# Patient Record
Sex: FEMALE | Race: WHITE | NOT HISPANIC OR LATINO | Employment: FULL TIME | URBAN - METROPOLITAN AREA
[De-identification: names, ages, dates, MRNs, and addresses within clinical notes are randomized per-mention and may not be internally consistent; named-entity substitution may affect disease eponyms.]

---

## 2016-11-30 LAB — HUMAN PAPILLOMAVIRUS (HPV): NORMAL

## 2018-07-11 ENCOUNTER — OFFICE VISIT (OUTPATIENT)
Dept: FAMILY MEDICINE | Facility: CLINIC | Age: 23
End: 2018-07-11
Payer: COMMERCIAL

## 2018-07-11 VITALS
TEMPERATURE: 98 F | WEIGHT: 122.56 LBS | OXYGEN SATURATION: 98 % | DIASTOLIC BLOOD PRESSURE: 70 MMHG | BODY MASS INDEX: 20.42 KG/M2 | HEART RATE: 96 BPM | SYSTOLIC BLOOD PRESSURE: 130 MMHG | HEIGHT: 65 IN

## 2018-07-11 DIAGNOSIS — F41.9 ANXIETY: ICD-10-CM

## 2018-07-11 DIAGNOSIS — Z00.00 ANNUAL PHYSICAL EXAM: Primary | ICD-10-CM

## 2018-07-11 PROCEDURE — 99385 PREV VISIT NEW AGE 18-39: CPT | Mod: S$GLB,,, | Performed by: INTERNAL MEDICINE

## 2018-07-11 PROCEDURE — 99999 PR PBB SHADOW E&M-NEW PATIENT-LVL III: CPT | Mod: PBBFAC,,, | Performed by: INTERNAL MEDICINE

## 2018-07-11 RX ORDER — BUSPIRONE HYDROCHLORIDE 7.5 MG/1
7.5 TABLET ORAL DAILY PRN
Qty: 30 TABLET | Refills: 0 | Status: SHIPPED | OUTPATIENT
Start: 2018-07-11 | End: 2019-05-21

## 2018-07-11 RX ORDER — NORETHINDRONE ACETATE AND ETHINYL ESTRADIOL 1MG-20(21)
1 KIT ORAL DAILY
Refills: 3 | COMMUNITY
Start: 2018-05-30 | End: 2019-03-07

## 2018-07-11 NOTE — PROGRESS NOTES
SUBJECTIVE     Chief Complaint   Patient presents with    [facility]    Annual Exam     leg issues        HPI  Niesha Christianson is a 23 y.o. female with multiple medical diagnoses as listed in the medical history and problem list that presents for annual exam. Pt was last seen by a physician ~2 years ago, so has been doing well since her last visit. She has a good appetite, but is slightly decreased. She gets ~7-8 hours of sleep nightly. Pt does not exercise. She does not take any OTC supplements. Her last pap smear in 4/2018.    PAST MEDICAL HISTORY:  History reviewed. No pertinent past medical history.    PAST SURGICAL HISTORY:  History reviewed. No pertinent surgical history.    SOCIAL HISTORY:  Social History     Social History    Marital status:      Spouse name: N/A    Number of children: N/A    Years of education: N/A     Occupational History    Not on file.     Social History Main Topics    Smoking status: Former Smoker     Quit date: 4/9/2013    Smokeless tobacco: Not on file    Alcohol use 10.8 oz/week     18 Cans of beer per week    Drug use: No    Sexual activity: Yes     Other Topics Concern    Not on file     Social History Narrative    No narrative on file       FAMILY HISTORY:  Family History   Problem Relation Age of Onset    Hypertension Mother     Hyperlipidemia Mother     Hypertension Father     Hyperlipidemia Father     Hypertension Maternal Uncle     Hypertension Paternal Uncle     Stroke Maternal Grandfather     Diabetes Paternal Grandmother     Cancer Paternal Grandfather 80        Pancreatic       ALLERGIES AND MEDICATIONS: updated and reviewed.  Review of patient's allergies indicates:  No Known Allergies  Current Outpatient Prescriptions   Medication Sig Dispense Refill    busPIRone (BUSPAR) 7.5 MG tablet Take 1 tablet (7.5 mg total) by mouth daily as needed. 30 tablet 0    desonide (DESOWEN) 0.05 % lotion Apply topically 2 (two) times daily. No longer  "than 2 weeks 1 Bottle 1    lidocaine HCl-hydrocortison ac (LIDAMANTLE-HC) 3-0.5 % topical cream Apply 1 application topically 3 (three) times daily as needed. 1 Tube 3    norethindrone-ethinyl estradiol (JUNEL FE 1/20) 1 mg-20 mcg (21)/75 mg (7) per tablet Take 1 tablet by mouth once daily.  3     No current facility-administered medications for this visit.        ROS  Review of Systems   Constitutional: Negative for chills and fever.   HENT: Negative for hearing loss and sore throat.    Eyes: Negative for visual disturbance.   Respiratory: Positive for shortness of breath. Negative for cough.    Cardiovascular: Negative for chest pain, palpitations and leg swelling.   Gastrointestinal: Positive for abdominal pain. Negative for constipation, diarrhea, nausea and vomiting.   Genitourinary: Negative for dysuria, frequency and urgency.   Musculoskeletal: Positive for arthralgias (BLE ). Negative for joint swelling and myalgias.   Skin: Negative for rash and wound.   Neurological: Negative for headaches.   Psychiatric/Behavioral: Negative for agitation and confusion. The patient is nervous/anxious.          OBJECTIVE     Physical Exam  Vitals:    07/11/18 1343   BP: 130/70   Pulse: 96   Temp: 98.3 °F (36.8 °C)    Body mass index is 20.4 kg/m².  Weight: 55.6 kg (122 lb 9.2 oz)   Height: 5' 5" (165.1 cm)     Physical Exam   Constitutional: She is oriented to person, place, and time. She appears well-developed and well-nourished. No distress.   HENT:   Head: Normocephalic and atraumatic.   Right Ear: External ear normal.   Left Ear: External ear normal.   Nose: Nose normal.   Mouth/Throat: Oropharynx is clear and moist.   Eyes: Conjunctivae and EOM are normal. Right eye exhibits no discharge. Left eye exhibits no discharge. No scleral icterus.   Neck: Normal range of motion. Neck supple. No JVD present. No tracheal deviation present.   Cardiovascular: Normal rate, regular rhythm and intact distal pulses.  Exam reveals no " gallop and no friction rub.    No murmur heard.  Pulmonary/Chest: Effort normal and breath sounds normal. No respiratory distress. She has no wheezes.   Abdominal: Soft. Bowel sounds are normal. She exhibits no distension and no mass. There is no tenderness. There is no rebound and no guarding.   Musculoskeletal: Normal range of motion. She exhibits no edema, tenderness or deformity.   Neurological: She is alert and oriented to person, place, and time. She exhibits normal muscle tone. Coordination normal.   Skin: Skin is warm and dry. No rash noted. No erythema.   Psychiatric: She has a normal mood and affect. Her behavior is normal. Judgment and thought content normal.         Health Maintenance       Date Due Completion Date    Lipid Panel 1995 ---    HPV Vaccines (1 of 3 - Female 3 Dose Series) 01/24/2006 ---    CHLAMYDIA SCREENING 01/24/2010 ---    TETANUS VACCINE 01/24/2013 ---    Pap Smear 01/24/2016 ---    Influenza Vaccine 08/01/2018 ---            ASSESSMENT     23 y.o. female with     1. Annual physical exam    2. Anxiety        PLAN:     1. Annual physical exam  - Counseled on age appropriate medical preventative services, including age appropriate cancer screenings, over all nutritional health, need for a consistent exercise regimen and an over all push towards maintaining a vigorous and active lifestyle.  Counseled on age appropriate vaccines and discussed upcoming health care needs based on age/gender.  Spent time with patient counseling on need for a good patient/doctor relationship moving forward.  Discussed use of common OTC medications and supplements.  Discussed common dietary aids and use of caffeine and the need for good sleep hygiene and stress management.  - MEHRAN completed today to get pap smear results  - CBC auto differential; Future  - Comprehensive metabolic panel; Future  - TSH; Future  - Lipid panel; Future    2. Anxiety  - Will start trial course of Buspar prn  - busPIRone (BUSPAR)  7.5 MG tablet; Take 1 tablet (7.5 mg total) by mouth daily as needed.  Dispense: 30 tablet; Refill: 0        RTC in 4 weeks for repeat assessment of current treatment plan       Jaquelin Morales MD  07/11/2018 1:51 PM        No Follow-up on file.

## 2018-07-24 ENCOUNTER — TELEPHONE (OUTPATIENT)
Dept: ADMINISTRATIVE | Facility: HOSPITAL | Age: 23
End: 2018-07-24

## 2019-02-14 ENCOUNTER — OFFICE VISIT (OUTPATIENT)
Dept: FAMILY MEDICINE | Facility: CLINIC | Age: 24
End: 2019-02-14
Payer: COMMERCIAL

## 2019-02-14 VITALS
HEART RATE: 84 BPM | TEMPERATURE: 98 F | DIASTOLIC BLOOD PRESSURE: 80 MMHG | HEIGHT: 65 IN | SYSTOLIC BLOOD PRESSURE: 108 MMHG | RESPIRATION RATE: 18 BRPM | OXYGEN SATURATION: 98 % | BODY MASS INDEX: 22.48 KG/M2 | WEIGHT: 134.94 LBS

## 2019-02-14 DIAGNOSIS — M79.605 PAIN IN BOTH LOWER EXTREMITIES: Primary | ICD-10-CM

## 2019-02-14 DIAGNOSIS — M79.604 PAIN IN BOTH LOWER EXTREMITIES: Primary | ICD-10-CM

## 2019-02-14 PROCEDURE — 99999 PR PBB SHADOW E&M-EST. PATIENT-LVL IV: ICD-10-PCS | Mod: PBBFAC,,, | Performed by: INTERNAL MEDICINE

## 2019-02-14 PROCEDURE — 99214 PR OFFICE/OUTPT VISIT, EST, LEVL IV, 30-39 MIN: ICD-10-PCS | Mod: S$GLB,,, | Performed by: INTERNAL MEDICINE

## 2019-02-14 PROCEDURE — 99214 OFFICE O/P EST MOD 30 MIN: CPT | Mod: S$GLB,,, | Performed by: INTERNAL MEDICINE

## 2019-02-14 PROCEDURE — 99999 PR PBB SHADOW E&M-EST. PATIENT-LVL IV: CPT | Mod: PBBFAC,,, | Performed by: INTERNAL MEDICINE

## 2019-02-14 NOTE — PROGRESS NOTES
"SUBJECTIVE     Chief Complaint   Patient presents with    Leg Pain       HPI  Niesha Pierre is a 24 y.o. female with multiple medical diagnoses as listed in the medical history and problem list that presents for evaluation of BLE x 2 months. Pt reports pain is achy at a 4/10 and intermittent in nature without radiation. Pain can be in the inner thighs and lower legs. Pain occurs with rest and activity. Pt has been taking Advil on occasion, but is unclear if it has alleviated the discomfort. Denies any associated swelling, but she sometimes feels "knots" in her legs.     PAST MEDICAL HISTORY:  History reviewed. No pertinent past medical history.    PAST SURGICAL HISTORY:  History reviewed. No pertinent surgical history.    SOCIAL HISTORY:  Social History     Socioeconomic History    Marital status:      Spouse name: Not on file    Number of children: Not on file    Years of education: Not on file    Highest education level: Not on file   Social Needs    Financial resource strain: Not on file    Food insecurity - worry: Not on file    Food insecurity - inability: Not on file    Transportation needs - medical: Not on file    Transportation needs - non-medical: Not on file   Occupational History    Not on file   Tobacco Use    Smoking status: Former Smoker     Last attempt to quit: 2013     Years since quittin.8   Substance and Sexual Activity    Alcohol use: Yes     Alcohol/week: 10.8 oz     Types: 18 Cans of beer per week    Drug use: No    Sexual activity: Yes   Other Topics Concern    Not on file   Social History Narrative    Not on file       FAMILY HISTORY:  Family History   Problem Relation Age of Onset    Hypertension Mother     Hyperlipidemia Mother     Hypertension Father     Hyperlipidemia Father     Hypertension Maternal Uncle     Hypertension Paternal Uncle     Stroke Maternal Grandfather     Diabetes Paternal Grandmother     Cancer Paternal Grandfather 80 " "       Pancreatic       ALLERGIES AND MEDICATIONS: updated and reviewed.  Review of patient's allergies indicates:  No Known Allergies  Current Outpatient Medications   Medication Sig Dispense Refill    busPIRone (BUSPAR) 7.5 MG tablet Take 1 tablet (7.5 mg total) by mouth daily as needed. 30 tablet 0    desonide (DESOWEN) 0.05 % lotion Apply topically 2 (two) times daily. No longer than 2 weeks 1 Bottle 1    lidocaine HCl-hydrocortison ac (LIDAMANTLE-HC) 3-0.5 % topical cream Apply 1 application topically 3 (three) times daily as needed. 1 Tube 3    norethindrone-ethinyl estradiol (JUNEL FE 1/20) 1 mg-20 mcg (21)/75 mg (7) per tablet Take 1 tablet by mouth once daily.  3     No current facility-administered medications for this visit.        ROS  Review of Systems   Constitutional: Negative for chills and fever.   HENT: Negative for hearing loss and sore throat.    Eyes: Negative for visual disturbance.   Respiratory: Negative for cough and shortness of breath.    Cardiovascular: Negative for chest pain, palpitations and leg swelling.   Gastrointestinal: Negative for abdominal pain, constipation, diarrhea, nausea and vomiting.   Genitourinary: Negative for dysuria, frequency and urgency.   Musculoskeletal: Negative for arthralgias, joint swelling and myalgias.        BLE pain   Skin: Negative for rash and wound.   Neurological: Negative for headaches.   Psychiatric/Behavioral: Negative for agitation and confusion. The patient is not nervous/anxious.          OBJECTIVE     Physical Exam  Vitals:    02/14/19 1308   BP: 108/80   Pulse: 84   Resp: 18   Temp: 98.4 °F (36.9 °C)    Body mass index is 22.45 kg/m².  Weight: 61.2 kg (134 lb 14.7 oz)   Height: 5' 5" (165.1 cm)     Physical Exam   Constitutional: She is oriented to person, place, and time. She appears well-developed and well-nourished. No distress.   HENT:   Head: Normocephalic and atraumatic.   Right Ear: External ear normal.   Left Ear: External ear " normal.   Nose: Nose normal.   Mouth/Throat: Oropharynx is clear and moist.   Eyes: Conjunctivae and EOM are normal. Right eye exhibits no discharge. Left eye exhibits no discharge. No scleral icterus.   Neck: Normal range of motion. Neck supple. No JVD present. No tracheal deviation present.   Pulmonary/Chest: Effort normal. No respiratory distress.   Musculoskeletal: Normal range of motion. She exhibits no deformity.   Neurological: She is alert and oriented to person, place, and time. She exhibits normal muscle tone. Coordination normal.   Skin: Skin is warm and dry. No rash noted. No erythema.   Psychiatric: She has a normal mood and affect. Her behavior is normal. Judgment and thought content normal.         Health Maintenance       Date Due Completion Date    Lipid Panel 1995 ---    CHLAMYDIA SCREENING 01/24/2010 ---    HPV Vaccines (1 - Female 3-dose series) 01/24/2010 ---    TETANUS VACCINE 01/24/2013 ---    Influenza Vaccine 08/01/2018 ---    Pap Smear with HPV Cotest 11/30/2021 11/30/2016 (Done)    Override on 11/30/2016: Done (scanned in chart)            ASSESSMENT     24 y.o. female with     1. Pain in both lower extremities        PLAN:     1. Pain in both lower extremities  - Unknown etiology, but concern for autoimmune process vs vitamin def vs PAD; will workup as below  - Pt okay to take NSAIDs or Tylenol prn pain  - Sedimentation rate; Future  - Rheumatoid factor; Future  - Cyclic citrul peptide antibody, IgG; Future  - High sensitivity CRP (Cardiac CRP); Future  - C-reactive protein; Future  - Vitamin D; Future  - Vitamin B12; Future  - Folate; Future  - SALIMA Screen w/Reflex; Future  - US Lower Extrem Arteries Bilat with TAYA; Future  - US Lower Extremity Veins Bilateral; Future        RTC in 4 weeks for repeat assessment of current treatment plan       Jaquelin Morales MD  02/14/2019 1:13 PM        No Follow-up on file.

## 2019-02-16 ENCOUNTER — LAB VISIT (OUTPATIENT)
Dept: LAB | Facility: HOSPITAL | Age: 24
End: 2019-02-16
Attending: INTERNAL MEDICINE
Payer: COMMERCIAL

## 2019-02-16 DIAGNOSIS — M79.604 PAIN IN BOTH LOWER EXTREMITIES: ICD-10-CM

## 2019-02-16 DIAGNOSIS — M79.605 PAIN IN BOTH LOWER EXTREMITIES: ICD-10-CM

## 2019-02-16 LAB
CRP SERPL-MCNC: 0.5 MG/L
ERYTHROCYTE [SEDIMENTATION RATE] IN BLOOD BY WESTERGREN METHOD: 2 MM/HR

## 2019-02-16 PROCEDURE — 86141 C-REACTIVE PROTEIN HS: CPT

## 2019-02-16 PROCEDURE — 36415 COLL VENOUS BLD VENIPUNCTURE: CPT

## 2019-02-16 PROCEDURE — 85652 RBC SED RATE AUTOMATED: CPT

## 2019-02-16 PROCEDURE — 86200 CCP ANTIBODY: CPT

## 2019-02-16 PROCEDURE — 82607 VITAMIN B-12: CPT

## 2019-02-16 PROCEDURE — 86140 C-REACTIVE PROTEIN: CPT

## 2019-02-16 PROCEDURE — 82306 VITAMIN D 25 HYDROXY: CPT

## 2019-02-16 PROCEDURE — 86038 ANTINUCLEAR ANTIBODIES: CPT

## 2019-02-16 PROCEDURE — 82746 ASSAY OF FOLIC ACID SERUM: CPT

## 2019-02-16 PROCEDURE — 86431 RHEUMATOID FACTOR QUANT: CPT

## 2019-02-17 LAB
25(OH)D3+25(OH)D2 SERPL-MCNC: 29 NG/ML
CCP AB SER IA-ACNC: <0.5 U/ML
CRP SERPL-MCNC: 0.47 MG/L
FOLATE SERPL-MCNC: 11.1 NG/ML
VIT B12 SERPL-MCNC: 460 PG/ML

## 2019-02-18 ENCOUNTER — PATIENT MESSAGE (OUTPATIENT)
Dept: FAMILY MEDICINE | Facility: CLINIC | Age: 24
End: 2019-02-18

## 2019-02-18 PROBLEM — E55.9 VITAMIN D INSUFFICIENCY: Status: ACTIVE | Noted: 2019-02-18

## 2019-02-18 LAB
ANA SER QL IF: NORMAL
RHEUMATOID FACT SERPL-ACNC: <10 IU/ML

## 2019-02-22 ENCOUNTER — TELEPHONE (OUTPATIENT)
Dept: FAMILY MEDICINE | Facility: CLINIC | Age: 24
End: 2019-02-22

## 2019-02-22 ENCOUNTER — HOSPITAL ENCOUNTER (OUTPATIENT)
Dept: RADIOLOGY | Facility: HOSPITAL | Age: 24
Discharge: HOME | End: 2019-02-22
Attending: INTERNAL MEDICINE
Payer: COMMERCIAL

## 2019-02-22 ENCOUNTER — OFFICE VISIT (OUTPATIENT)
Dept: FAMILY MEDICINE | Facility: CLINIC | Age: 24
End: 2019-02-22
Payer: COMMERCIAL

## 2019-02-22 VITALS
BODY MASS INDEX: 23.11 KG/M2 | SYSTOLIC BLOOD PRESSURE: 124 MMHG | WEIGHT: 138.88 LBS | TEMPERATURE: 100 F | DIASTOLIC BLOOD PRESSURE: 82 MMHG | HEART RATE: 94 BPM | OXYGEN SATURATION: 97 %

## 2019-02-22 DIAGNOSIS — M79.605 PAIN IN BOTH LOWER EXTREMITIES: ICD-10-CM

## 2019-02-22 DIAGNOSIS — I82.432 ACUTE DEEP VEIN THROMBOSIS (DVT) OF POPLITEAL VEIN OF LEFT LOWER EXTREMITY: Primary | ICD-10-CM

## 2019-02-22 DIAGNOSIS — M79.604 PAIN IN BOTH LOWER EXTREMITIES: ICD-10-CM

## 2019-02-22 PROCEDURE — 93970 EXTREMITY STUDY: CPT | Mod: TC

## 2019-02-22 PROCEDURE — 99214 PR OFFICE/OUTPT VISIT, EST, LEVL IV, 30-39 MIN: ICD-10-PCS | Mod: S$GLB,,, | Performed by: INTERNAL MEDICINE

## 2019-02-22 PROCEDURE — 93925 LOWER EXTREMITY STUDY: CPT | Mod: 26,,, | Performed by: RADIOLOGY

## 2019-02-22 PROCEDURE — 93925 US LOWER EXTREMITY ARTERIES BILATERAL: ICD-10-PCS | Mod: 26,,, | Performed by: RADIOLOGY

## 2019-02-22 PROCEDURE — 99999 PR PBB SHADOW E&M-EST. PATIENT-LVL III: ICD-10-PCS | Mod: PBBFAC,,, | Performed by: INTERNAL MEDICINE

## 2019-02-22 PROCEDURE — 99999 PR PBB SHADOW E&M-EST. PATIENT-LVL III: CPT | Mod: PBBFAC,,, | Performed by: INTERNAL MEDICINE

## 2019-02-22 PROCEDURE — 93925 LOWER EXTREMITY STUDY: CPT | Mod: TC

## 2019-02-22 PROCEDURE — 93970 US LOWER EXTREMITY VEINS BILATERAL: ICD-10-PCS | Mod: 26,,, | Performed by: RADIOLOGY

## 2019-02-22 PROCEDURE — 93970 EXTREMITY STUDY: CPT | Mod: 26,,, | Performed by: RADIOLOGY

## 2019-02-22 PROCEDURE — 99214 OFFICE O/P EST MOD 30 MIN: CPT | Mod: S$GLB,,, | Performed by: INTERNAL MEDICINE

## 2019-02-22 NOTE — TELEPHONE ENCOUNTER
Pt called stating she was told she has a blood clot, but the Radiology department could not get a hold of me. I informed the pt that I do not have results yet nor have I been contacted by anyone. I informed pt that she would need an appointment to see me if she does have a DVT. She voiced understanding and asked if she could come now because she is so nervous. I told the pt yes. Will await results and see pt after results viewed and/or I have been contacted due to a positive test. Medical staff informed okay to override schedule when results made available.

## 2019-02-22 NOTE — PROGRESS NOTES
SUBJECTIVE     Chief Complaint   Patient presents with    Results     us       HPI  Niesha Pierre is a 24 y.o. female with multiple medical diagnoses as listed in the medical history and problem list that presents for follow-up after U/S today which was positive for a DVT. Pt reports she is nervous because she was told by the Radiology department that she has a blood clot in her left leg. She had been having pain in the RLE, so is surprised a blood clot was found in the LLE. She denies any recent surgery/hospitalizations, recent long travel via plane/car, she stopped OCPs back in 2018, but does have a family history of an uncle that had a blood clot in his arm. Pt reports smoking only 1 cigarette in the past 2 weeks or so. 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    PAST MEDICAL HISTORY:  History reviewed. No pertinent past medical history.    PAST SURGICAL HISTORY:  History reviewed. No pertinent surgical history.    SOCIAL HISTORY:  Social History     Socioeconomic History    Marital status:      Spouse name: Not on file    Number of children: Not on file    Years of education: Not on file    Highest education level: Not on file   Social Needs    Financial resource strain: Not on file    Food insecurity - worry: Not on file    Food insecurity - inability: Not on file    Transportation needs - medical: Not on file    Transportation needs - non-medical: Not on file   Occupational History    Not on file   Tobacco Use    Smoking status: Former Smoker     Last attempt to quit: 2013     Years since quittin.8   Substance and Sexual Activity    Alcohol use: Yes     Alcohol/week: 10.8 oz     Types: 18 Cans of beer per week    Drug use: No    Sexual activity: Yes   Other Topics Concern    Not on file   Social History Narrative    Not on file       FAMILY HISTORY:  Family History   Problem Relation Age of  Onset    Hypertension Mother     Hyperlipidemia Mother     Hypertension Father     Hyperlipidemia Father     Hypertension Maternal Uncle     Hypertension Paternal Uncle     Stroke Maternal Grandfather     Diabetes Paternal Grandmother     Cancer Paternal Grandfather 80        Pancreatic       ALLERGIES AND MEDICATIONS: updated and reviewed.  Review of patient's allergies indicates:  No Known Allergies  Current Outpatient Medications   Medication Sig Dispense Refill    apixaban (ELIQUIS) 5 mg Tab Take 2 tablets by mouth twice daily for 7 days, then take 1 tablet by mouth twice daily 208 tablet 0    busPIRone (BUSPAR) 7.5 MG tablet Take 1 tablet (7.5 mg total) by mouth daily as needed. 30 tablet 0    desonide (DESOWEN) 0.05 % lotion Apply topically 2 (two) times daily. No longer than 2 weeks 1 Bottle 1    lidocaine HCl-hydrocortison ac (LIDAMANTLE-HC) 3-0.5 % topical cream Apply 1 application topically 3 (three) times daily as needed. 1 Tube 3    norethindrone-ethinyl estradiol (JUNEL FE 1/20) 1 mg-20 mcg (21)/75 mg (7) per tablet Take 1 tablet by mouth once daily.  3     No current facility-administered medications for this visit.        ROS  Review of Systems   Constitutional: Negative for chills and fever.   HENT: Negative for hearing loss and sore throat.    Eyes: Negative for visual disturbance.   Respiratory: Negative for cough and shortness of breath.    Cardiovascular: Negative for chest pain, palpitations and leg swelling.   Gastrointestinal: Negative for abdominal pain, constipation, diarrhea, nausea and vomiting.   Genitourinary: Negative for dysuria, frequency and urgency.   Musculoskeletal: Negative for arthralgias, joint swelling and myalgias.   Skin: Negative for rash and wound.   Neurological: Negative for headaches.   Psychiatric/Behavioral: Negative for agitation and confusion. The patient is not nervous/anxious.          OBJECTIVE     Physical Exam  Vitals:    02/22/19 1027   BP:  124/82   Pulse: 94   Temp: 99.6 °F (37.6 °C)    Body mass index is 23.11 kg/m².  Weight: 63 kg (138 lb 14.2 oz)         Physical Exam   Constitutional: She is oriented to person, place, and time. She appears well-developed and well-nourished. No distress.   HENT:   Head: Normocephalic and atraumatic.   Right Ear: Hearing, tympanic membrane and external ear normal.   Left Ear: Hearing, tympanic membrane and external ear normal.   Nose: Nose normal. No rhinorrhea.   Mouth/Throat: Oropharynx is clear and moist. No uvula swelling. No posterior oropharyngeal edema or posterior oropharyngeal erythema.   Eyes: Conjunctivae and EOM are normal. Right eye exhibits no discharge. Left eye exhibits no discharge. No scleral icterus.   Neck: Normal range of motion. Neck supple. No JVD present. No tracheal deviation present.   Cardiovascular: Normal rate, regular rhythm and intact distal pulses. Exam reveals no gallop and no friction rub.   No murmur heard.  Pulmonary/Chest: Effort normal and breath sounds normal. No respiratory distress. She has no wheezes.   Abdominal: Soft. Bowel sounds are normal. She exhibits no distension and no mass. There is no tenderness. There is no rebound and no guarding.   Musculoskeletal: Normal range of motion. She exhibits no edema, tenderness or deformity.   Neurological: She is alert and oriented to person, place, and time. She exhibits normal muscle tone. Coordination normal.   Skin: Skin is warm and dry. No rash noted. No erythema.   Psychiatric: She has a normal mood and affect. Her behavior is normal. Judgment and thought content normal.         Health Maintenance       Date Due Completion Date    CHLAMYDIA SCREENING 01/24/2010 ---    HPV Vaccines (1 - Female 3-dose series) 01/24/2010 ---    TETANUS VACCINE 01/24/2013 ---    Influenza Vaccine 08/01/2018 ---    Pap Smear with HPV Cotest 11/30/2021 11/30/2016 (Done)    Override on 11/30/2016: Done (scanned in chart)            ASSESSMENT     24  y.o. female with     1. Acute deep vein thrombosis (DVT) of popliteal vein of left lower extremity        PLAN:     1. Acute deep vein thrombosis (DVT) of popliteal vein of left lower extremity  - Will start pt on Eliquis and pt given info on DVT  - Appears to be an unprovoked DVT as pt off OCPs since 7/2018, so will send to Heme/Onc for further eval  - Advised to go straight to ED for any chest pain/SOB as the clot could have moved into the lungs; also advised to wear loose fitting clothing and walk every 1-2 hours while on the job; she voiced understanding   - apixaban (ELIQUIS) 5 mg Tab; Take 2 tablets by mouth twice daily for 7 days, then take 1 tablet by mouth twice daily  Dispense: 208 tablet; Refill: 0  - POCT urine pregnancy; negative  - Ambulatory referral to Hematology / Oncology        RTC in 3 months     Jaquelin Morales MD  02/22/2019 11:01 AM        No Follow-up on file.

## 2019-02-25 ENCOUNTER — TELEPHONE (OUTPATIENT)
Dept: HEMATOLOGY/ONCOLOGY | Facility: CLINIC | Age: 24
End: 2019-02-25

## 2019-02-27 ENCOUNTER — TELEPHONE (OUTPATIENT)
Dept: HEMATOLOGY/ONCOLOGY | Facility: CLINIC | Age: 24
End: 2019-02-27

## 2019-02-28 ENCOUNTER — PATIENT MESSAGE (OUTPATIENT)
Dept: FAMILY MEDICINE | Facility: CLINIC | Age: 24
End: 2019-02-28

## 2019-03-07 ENCOUNTER — INITIAL CONSULT (OUTPATIENT)
Dept: HEMATOLOGY/ONCOLOGY | Facility: CLINIC | Age: 24
End: 2019-03-07
Payer: COMMERCIAL

## 2019-03-07 ENCOUNTER — LAB VISIT (OUTPATIENT)
Dept: LAB | Facility: HOSPITAL | Age: 24
End: 2019-03-07
Attending: ANESTHESIOLOGY
Payer: COMMERCIAL

## 2019-03-07 VITALS
HEIGHT: 66 IN | OXYGEN SATURATION: 99 % | HEART RATE: 91 BPM | SYSTOLIC BLOOD PRESSURE: 125 MMHG | WEIGHT: 139.13 LBS | DIASTOLIC BLOOD PRESSURE: 73 MMHG | BODY MASS INDEX: 22.36 KG/M2 | TEMPERATURE: 98 F

## 2019-03-07 DIAGNOSIS — I82.432 ACUTE DEEP VEIN THROMBOSIS (DVT) OF POPLITEAL VEIN OF LEFT LOWER EXTREMITY: ICD-10-CM

## 2019-03-07 DIAGNOSIS — I82.432 ACUTE DEEP VEIN THROMBOSIS (DVT) OF POPLITEAL VEIN OF LEFT LOWER EXTREMITY: Primary | ICD-10-CM

## 2019-03-07 LAB
FIBRINOGEN PPP-MCNC: 260 MG/DL
HCYS SERPL-SCNC: 8.2 UMOL/L

## 2019-03-07 PROCEDURE — 85300 ANTITHROMBIN III ACTIVITY: CPT

## 2019-03-07 PROCEDURE — 85305 CLOT INHIBIT PROT S TOTAL: CPT

## 2019-03-07 PROCEDURE — 81240 F2 GENE: CPT

## 2019-03-07 PROCEDURE — 85598 HEXAGNAL PHOSPH PLTLT NEUTRL: CPT

## 2019-03-07 PROCEDURE — 99999 PR PBB SHADOW E&M-EST. PATIENT-LVL III: ICD-10-PCS | Mod: PBBFAC,,, | Performed by: INTERNAL MEDICINE

## 2019-03-07 PROCEDURE — 81241 F5 GENE: CPT

## 2019-03-07 PROCEDURE — 83090 ASSAY OF HOMOCYSTEINE: CPT

## 2019-03-07 PROCEDURE — 99205 OFFICE O/P NEW HI 60 MIN: CPT | Mod: S$GLB,,, | Performed by: INTERNAL MEDICINE

## 2019-03-07 PROCEDURE — 85303 CLOT INHIBIT PROT C ACTIVITY: CPT

## 2019-03-07 PROCEDURE — 85384 FIBRINOGEN ACTIVITY: CPT

## 2019-03-07 PROCEDURE — 99999 PR PBB SHADOW E&M-EST. PATIENT-LVL III: CPT | Mod: PBBFAC,,, | Performed by: INTERNAL MEDICINE

## 2019-03-07 PROCEDURE — 86147 CARDIOLIPIN ANTIBODY EA IG: CPT | Mod: 59

## 2019-03-07 PROCEDURE — 85613 RUSSELL VIPER VENOM DILUTED: CPT

## 2019-03-07 PROCEDURE — 99205 PR OFFICE/OUTPT VISIT, NEW, LEVL V, 60-74 MIN: ICD-10-PCS | Mod: S$GLB,,, | Performed by: INTERNAL MEDICINE

## 2019-03-07 RX ORDER — CHOLECALCIFEROL (VITAMIN D3) 25 MCG
1000 TABLET ORAL DAILY
COMMUNITY
End: 2019-05-21

## 2019-03-07 NOTE — LETTER
March 7, 2019      Jaquelin Morales MD  [address] [address]  [address]  [place] [address]           [facility]  [address] [facility] [place] [address]  Phone: [phone]          Patient: Niesha Pierre   MR Number: [id number]   YOB: 1995   Date of Visit: 3/7/2019       Dear Dr. Jaquelin Morales:    Thank you for referring Niesha Pierre to me for evaluation. Attached you will find relevant portions of my assessment and plan of care.    If you have questions, please do not hesitate to call me. I look forward to following Niesha Pierre along with you.    Sincerely,    Brynn Del Real MD    Enclosure  CC:  No Recipients    If you would like to receive this communication electronically, please contact [email or web address] or [phone] to request more information on [facility] access.    For providers and/or their staff who would like to refer a patient to [facility], please contact us through our one-stop-shop provider referral line, [facility], at [phone].    If you feel you have received this communication in error or would no longer like to receive these types of communications, please e-mail [email or web address]

## 2019-03-07 NOTE — PROGRESS NOTES
Chief Complaint :   DVT    Hx of Present illness :  Patient is a 24 y.o. year old female who presents to the clinic today for   Oncology evaluation. Was experiencing pain left leg. Evaluated by physician. U/S revealed DVT in left Popliteal vein.  Was on BC pill for three years. 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     Transfusion :  None    Menstrual & obstetric Hx :  Nulliparous.   Age of menarche:  12  Age of first pregnancy: N/A  Lactation history: N/a  Age of menopause: N/A  HRT: None    Present Meds :   Medication List with Changes/Refills   Current Medications    APIXABAN (ELIQUIS) 5 MG TAB    Take 2 tablets by mouth twice daily for 7 days, then take 1 tablet by mouth twice daily    BUSPIRONE (BUSPAR) 7.5 MG TABLET    Take 1 tablet (7.5 mg total) by mouth daily as needed.    DESONIDE (DESOWEN) 0.05 % LOTION    Apply topically 2 (two) times daily. No longer than 2 weeks    LIDOCAINE HCL-HYDROCORTISON AC (LIDAMANTLE-HC) 3-0.5 % TOPICAL CREAM    Apply 1 application topically 3 (three) times daily as needed.    NORETHINDRONE-ETHINYL ESTRADIOL (JUNEL FE 1/20) 1 MG-20 MCG (21)/75 MG (7) PER TABLET    Take 1 tablet by mouth once daily.       Past Medical Hx :  No prior Hx of DVT or PE. No Hx of DM. Hypertension,  Lupus, rheumatoid arthritis, Thyroid dysfunction. No Hx of CVA or Seizure disorder.     Past Medical Hx :  No past medical history on file.    Travel Hx :  None    Immunization :    There is no immunization history on file for this patient.    Family Hx :  Family History   Problem Relation Age of Onset    Hypertension Mother     Hyperlipidemia Mother     Hypertension Father     Hyperlipidemia Father     Hypertension Maternal Uncle     Hypertension Paternal Uncle     Stroke Maternal Grandfather     Diabetes Paternal Grandmother     Cancer Paternal Grandfather 80        Pancreatic        Social Hx :  Social History     Socioeconomic History    Marital status:      Spouse name: Not on file    Number of children: Not on file    Years of education: Not on file    Highest education level: Not on file   Social Needs    Financial resource strain: Not on file    Food insecurity - worry: Not on file    Food insecurity - inability: Not on file    Transportation needs - medical: Not on file    Transportation needs - non-medical: Not on file   Occupational History    Not on file   Tobacco Use    Smoking status: Former Smoker     Last attempt to quit: 2013     Years since quittin.9   Substance and Sexual Activity    Alcohol use: Yes     Alcohol/week: 10.8 oz     Types: 18 Cans of beer per week    Drug use: No    Sexual activity: Yes   Other Topics Concern    Not on file   Social History Narrative    Not on file       Surgery :  None    Symptoms :    Review of Systems   Constitutional: Negative for chills, fever, malaise/fatigue and weight loss.   HENT: Negative for congestion, hearing loss, nosebleeds, sore throat and tinnitus.    Eyes: Negative for blurred vision, double vision and photophobia.   Respiratory: Negative for cough, hemoptysis, sputum production and shortness of breath.    Cardiovascular: Negative.  Negative for chest pain, palpitations, orthopnea, claudication and leg swelling.   Gastrointestinal: Positive for constipation (Occasional.). Negative for abdominal pain, blood in stool, diarrhea, heartburn, nausea and vomiting.   Genitourinary: Negative.  Negative for dysuria, flank pain, frequency, hematuria and urgency.   Musculoskeletal: Negative for back pain, falls, joint pain, myalgias and neck pain.   Skin: Negative for itching and rash.   Neurological: Negative for dizziness, tingling, tremors, sensory change and headaches.        Sometimes tingling of feet.    Endo/Heme/Allergies: Negative for environmental allergies and polydipsia. Does not bruise/bleed  easily.   Psychiatric/Behavioral: Negative for depression. The patient is nervous/anxious. The patient does not have insomnia.        Physical Exam :   Physical Exam   Constitutional: She is oriented to person, place, and time and well-developed, well-nourished, and in no distress. Vital signs are normal. No distress.   HENT:   Head: Normocephalic and atraumatic.   Right Ear: External ear normal.   Left Ear: External ear normal.   Nose: Nose normal.   Mouth/Throat: Oropharynx is clear and moist. No oropharyngeal exudate.   Eyes: Conjunctivae, EOM and lids are normal. Pupils are equal, round, and reactive to light. Lids are everted and swept, no foreign bodies found. Right eye exhibits no discharge. Left eye exhibits no discharge. No scleral icterus.   Neck: Trachea normal, normal range of motion, full passive range of motion without pain and phonation normal. Neck supple. Normal carotid pulses, no hepatojugular reflux and no JVD present. No tracheal tenderness present. Carotid bruit is not present. No tracheal deviation present. No thyroid mass and no thyromegaly present.   Cardiovascular: Normal rate, regular rhythm, normal heart sounds, intact distal pulses and normal pulses. PMI is not displaced. Exam reveals no gallop.   No murmur heard.  Pulmonary/Chest: Effort normal and breath sounds normal. No stridor. No apnea. No respiratory distress. She has no wheezes. She has no rales. She exhibits no tenderness.   Abdominal: Soft. Normal appearance, normal aorta and bowel sounds are normal. She exhibits no distension and no mass. There is no hepatosplenomegaly. There is no tenderness. There is no rebound, no guarding and no CVA tenderness. No hernia.   Musculoskeletal: Normal range of motion. She exhibits no edema, tenderness or deformity.   Lymphadenopathy:        Head (right side): No submental, no submandibular, no tonsillar, no preauricular, no posterior auricular and no occipital adenopathy present.        Head  (left side): No submental, no submandibular, no tonsillar, no preauricular, no posterior auricular and no occipital adenopathy present.     She has no cervical adenopathy.     She has no axillary adenopathy.        Right: No inguinal, no supraclavicular and no epitrochlear adenopathy present.        Left: No inguinal, no supraclavicular and no epitrochlear adenopathy present.   Neurological: She is alert and oriented to person, place, and time. She has normal sensation, normal strength, normal reflexes and intact cranial nerves. She displays normal reflexes. No cranial nerve deficit. She exhibits normal muscle tone. Gait normal. Coordination normal. GCS score is 15.   Skin: Skin is warm, dry and intact. No rash noted. She is not diaphoretic. No cyanosis or erythema. No pallor. Nails show no clubbing.   Psychiatric: Mood, memory, affect and judgment normal.         Labs & Imaging : 02/14/09 : acute non occlusive DVT in left popliteal Vein. CBC,CMP,TSH,CRP,ESR and CRP.        Dx :  Acute DVT left Popliteal vein.       Assessment & Plan:  Reviewed with patient and her mother. Patient's maternal Uncle told to have Factor 5 Mutation and his daughter also has Leiden factor 5 Mutation.Patient to stay on Eliquis. No more Birth control pills. Complete  Coag work ordered. RTC 3 weeks.

## 2019-03-08 LAB
CARDIOLIPIN IGG SER IA-ACNC: <9.4 GPL
CARDIOLIPIN IGM SER IA-ACNC: <9.4 MPL
PROT S ACT/NOR PPP: >130 %

## 2019-03-11 LAB
AT III ACT/NOR PPP CHRO: >131 %
F2 GENE MUT ANL BLD/T: NORMAL
F5 P.R506Q BLD/T QL: NORMAL

## 2019-03-15 DIAGNOSIS — I82.432 ACUTE DEEP VEIN THROMBOSIS (DVT) OF POPLITEAL VEIN OF LEFT LOWER EXTREMITY: ICD-10-CM

## 2019-03-15 LAB
APTT HEX PL PPP: NEGATIVE S
APTT PROTEIN C ACTIVATOR+FV DP/APTT PPP: 114 %

## 2019-03-19 LAB — LA PPP-IMP: NEGATIVE

## 2019-03-28 ENCOUNTER — OFFICE VISIT (OUTPATIENT)
Dept: HEMATOLOGY/ONCOLOGY | Facility: CLINIC | Age: 24
End: 2019-03-28
Payer: COMMERCIAL

## 2019-03-28 VITALS
TEMPERATURE: 99 F | HEIGHT: 65 IN | SYSTOLIC BLOOD PRESSURE: 120 MMHG | HEART RATE: 76 BPM | WEIGHT: 140.88 LBS | OXYGEN SATURATION: 98 % | BODY MASS INDEX: 23.47 KG/M2 | DIASTOLIC BLOOD PRESSURE: 73 MMHG

## 2019-03-28 DIAGNOSIS — I82.432 ACUTE DEEP VEIN THROMBOSIS (DVT) OF POPLITEAL VEIN OF LEFT LOWER EXTREMITY: Primary | ICD-10-CM

## 2019-03-28 PROCEDURE — 99999 PR PBB SHADOW E&M-EST. PATIENT-LVL III: CPT | Mod: PBBFAC,,, | Performed by: INTERNAL MEDICINE

## 2019-03-28 PROCEDURE — 99213 OFFICE O/P EST LOW 20 MIN: CPT | Mod: S$GLB,,, | Performed by: INTERNAL MEDICINE

## 2019-03-28 PROCEDURE — 99213 PR OFFICE/OUTPT VISIT, EST, LEVL III, 20-29 MIN: ICD-10-PCS | Mod: S$GLB,,, | Performed by: INTERNAL MEDICINE

## 2019-03-28 PROCEDURE — 99999 PR PBB SHADOW E&M-EST. PATIENT-LVL III: ICD-10-PCS | Mod: PBBFAC,,, | Performed by: INTERNAL MEDICINE

## 2019-03-28 NOTE — PROGRESS NOTES
Chief Complaint :   DVT    Hx of Present illness :  Patient is a 24 y.o. year old female who presents to the clinic today for   Oncology evaluation. Was experiencing pain left leg. Evaluated by physician. U/S revealed DVT in left Popliteal vein, in Feb 2019. Was on BC pill for three years. 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     Transfusion :  None    Menstrual & obstetric Hx :  Nulliparous.   Age of menarche:  12  Age of first pregnancy: N/A  Lactation history: N/a  Age of menopause: N/A  HRT: None    Present Meds :   Medication List with Changes/Refills   Current Medications    APIXABAN (ELIQUIS) 5 MG TAB    Take 2 tablets by mouth twice daily for 7 days, then take 1 tablet by mouth twice daily    BUSPIRONE (BUSPAR) 7.5 MG TABLET    Take 1 tablet (7.5 mg total) by mouth daily as needed.    DESONIDE (DESOWEN) 0.05 % LOTION    Apply topically 2 (two) times daily. No longer than 2 weeks    IBUPROFEN (ADVIL,MOTRIN) 100 MG TABLET    Take 100 mg by mouth as needed for Temperature greater than.    LIDOCAINE HCL-HYDROCORTISON AC (LIDAMANTLE-HC) 3-0.5 % TOPICAL CREAM    Apply 1 application topically 3 (three) times daily as needed.    MULTIVITAMIN CAPSULE    Take 1 capsule by mouth once daily.    VITAMIN D (VITAMIN D3) 1000 UNITS TAB    Take 1,000 Units by mouth once daily.       Past Medical Hx :  No prior Hx of DVT or PE. No Hx of DM. Hypertension,  Lupus, rheumatoid arthritis, Thyroid dysfunction. No Hx of CVA or Seizure disorder.     Past Medical Hx :  No past medical history on file.    Travel Hx :  None    Immunization :    There is no immunization history on file for this patient.    Family Hx :  Family History   Problem Relation Age of Onset    Hypertension Mother     Hyperlipidemia Mother     Hypertension Father     Hyperlipidemia Father     Hypertension Maternal Uncle     Hypertension  Paternal Uncle     Stroke Maternal Grandfather     Diabetes Paternal Grandmother     Cancer Paternal Grandfather 80        Pancreatic       Social Hx :  Social History     Socioeconomic History    Marital status:      Spouse name: Not on file    Number of children: Not on file    Years of education: Not on file    Highest education level: Not on file   Occupational History    Not on file   Social Needs    Financial resource strain: Not on file    Food insecurity:     Worry: Not on file     Inability: Not on file    Transportation needs:     Medical: Not on file     Non-medical: Not on file   Tobacco Use    Smoking status: Former Smoker     Last attempt to quit: 2013     Years since quittin.9   Substance and Sexual Activity    Alcohol use: Yes     Alcohol/week: 10.8 oz     Types: 18 Cans of beer per week    Drug use: No    Sexual activity: Yes   Lifestyle    Physical activity:     Days per week: Not on file     Minutes per session: Not on file    Stress: Not on file   Relationships    Social connections:     Talks on phone: Not on file     Gets together: Not on file     Attends [employer] service: Not on file     Active member of club or organization: Not on file     Attends meetings of clubs or organizations: Not on file     Relationship status: Not on file    Intimate partner violence:     Fear of current or ex partner: Not on file     Emotionally abused: Not on file     Physically abused: Not on file     Forced sexual activity: Not on file   Other Topics Concern    Not on file   Social History Narrative    Not on file       Surgery :  None    Symptoms :    Review of Systems   Constitutional: Negative for chills, fever, malaise/fatigue and weight loss.   HENT: Negative for congestion, hearing loss, nosebleeds, sore throat and tinnitus.    Eyes: Negative for blurred vision, double vision and photophobia.   Respiratory: Negative.  Negative for cough, hemoptysis, sputum production  and shortness of breath.    Cardiovascular: Negative.  Negative for chest pain, palpitations, orthopnea, claudication and leg swelling.   Gastrointestinal: Positive for constipation (Occasional.). Negative for abdominal pain, blood in stool, diarrhea, heartburn, nausea and vomiting.        Has noted blood in stool when straining   Genitourinary: Negative.  Negative for dysuria, flank pain, frequency, hematuria and urgency.   Musculoskeletal: Positive for back pain. Negative for falls, joint pain, myalgias and neck pain.        Sx of flu. Legs not hurting as much.   Skin: Negative for itching and rash.   Neurological: Negative for dizziness, tingling, tremors, sensory change and headaches.        Sometimes tingling of feet.    Endo/Heme/Allergies: Negative for environmental allergies and polydipsia. Does not bruise/bleed easily.   Psychiatric/Behavioral: Negative for depression. The patient is nervous/anxious. The patient does not have insomnia.      No new Sx    Physical Exam :   Physical Exam   Constitutional: She is oriented to person, place, and time and well-developed, well-nourished, and in no distress. Vital signs are normal. No distress.   HENT:   Head: Normocephalic and atraumatic.   Right Ear: External ear normal.   Left Ear: External ear normal.   Nose: Nose normal.   Mouth/Throat: Oropharynx is clear and moist. No oropharyngeal exudate.   Eyes: Pupils are equal, round, and reactive to light. Conjunctivae, EOM and lids are normal. Lids are everted and swept, no foreign bodies found. Right eye exhibits no discharge. Left eye exhibits no discharge. No scleral icterus.   Neck: Trachea normal, normal range of motion, full passive range of motion without pain and phonation normal. Neck supple. Normal carotid pulses, no hepatojugular reflux and no JVD present. No tracheal tenderness present. Carotid bruit is not present. No tracheal deviation present. No thyroid mass and no thyromegaly present.   Cardiovascular:  Normal rate, regular rhythm, normal heart sounds, intact distal pulses and normal pulses. PMI is not displaced. Exam reveals no gallop.   No murmur heard.  Pulmonary/Chest: Effort normal and breath sounds normal. No stridor. No apnea. No respiratory distress. She has no wheezes. She has no rales. She exhibits no tenderness.   Abdominal: Soft. Normal appearance, normal aorta and bowel sounds are normal. She exhibits no distension and no mass. There is no hepatosplenomegaly. There is no tenderness. There is no rebound, no guarding and no CVA tenderness. No hernia.   Musculoskeletal: Normal range of motion. She exhibits no edema, tenderness or deformity.   Lymphadenopathy:        Head (right side): No submental, no submandibular, no tonsillar, no preauricular, no posterior auricular and no occipital adenopathy present.        Head (left side): No submental, no submandibular, no tonsillar, no preauricular, no posterior auricular and no occipital adenopathy present.     She has no cervical adenopathy.     She has no axillary adenopathy.        Right: No inguinal, no supraclavicular and no epitrochlear adenopathy present.        Left: No inguinal, no supraclavicular and no epitrochlear adenopathy present.   Neurological: She is alert and oriented to person, place, and time. She has normal sensation, normal strength, normal reflexes and intact cranial nerves. No cranial nerve deficit. She exhibits normal muscle tone. Gait normal. Coordination normal. GCS score is 15.   Skin: Skin is warm, dry and intact. No rash noted. She is not diaphoretic. No cyanosis or erythema. No pallor. Nails show no clubbing.   Psychiatric: Mood, memory, affect and judgment normal.     NoNew Finding.      Labs & Imaging : 02/14/09 : acute non occlusive DVT in left popliteal Vein.   03/07/19 AT3 >131.  Serum homocysteine, Cardiolipin antibody normal. Leiden factor 5 and Prothrombin 2, normal Genotype. Lupus anticoagulant not detected. Protein c and  s activity normal. Serum fibrinogen normal. Hexagonal phospholipid neutralisation normal.      Dx :  Acute DVT left Popliteal vein.       Assessment & Plan:  Reviewed with patient and her mother. Patient's  Coag labs within normal limits.Patient to stay on Eliquis for three pills. No more Birth control pills.   Repeat U/S left lower extremity in 3 months and see  MD. Patient understands and verbalised.

## 2019-04-05 ENCOUNTER — HOSPITAL ENCOUNTER (EMERGENCY)
Facility: HOSPITAL | Age: 24
Discharge: HOME | End: 2019-04-05
Attending: EMERGENCY MEDICINE
Payer: COMMERCIAL

## 2019-04-05 ENCOUNTER — TELEPHONE (OUTPATIENT)
Dept: FAMILY MEDICINE | Facility: CLINIC | Age: 24
End: 2019-04-05

## 2019-04-05 VITALS
DIASTOLIC BLOOD PRESSURE: 70 MMHG | SYSTOLIC BLOOD PRESSURE: 110 MMHG | HEART RATE: 72 BPM | BODY MASS INDEX: 23.32 KG/M2 | HEIGHT: 65 IN | TEMPERATURE: 99 F | RESPIRATION RATE: 20 BRPM | OXYGEN SATURATION: 100 % | WEIGHT: 140 LBS

## 2019-04-05 DIAGNOSIS — M79.89 LEFT LEG SWELLING: ICD-10-CM

## 2019-04-05 LAB
B-HCG UR QL: NEGATIVE
CTP QC/QA: YES

## 2019-04-05 PROCEDURE — 81025 URINE PREGNANCY TEST: CPT | Mod: ER | Performed by: EMERGENCY MEDICINE

## 2019-04-05 PROCEDURE — 99284 EMERGENCY DEPT VISIT MOD MDM: CPT | Mod: 25,ER

## 2019-04-05 NOTE — TELEPHONE ENCOUNTER
Patient requesting a call from Dr. Morales.  Stated she is concerned about blood clot in her leg.  Stated her leg is swollen, red and hurts when touched.  Stated she does not mean to be a bother but would like to know if she should go to the ER.  Please advise.     Advised patient that Dr. Morales may not be able to call her until the end of the business day. Stated she is okay with that, but would appreciate a call before the weekend.  Please be advised.

## 2019-04-05 NOTE — TELEPHONE ENCOUNTER
"Call and spoke with Pt and informed her that she needs to go to the ED/Urgent care for evaluation and treatment per orders of Dr. Morales. Pt states, "Well,I'm at work do I need to go now". Pt informed that she needed to go immediately. To inform her boss that she needs to leave work and seek medical attention. Pt states, " I don't want to go to the ER". Pt given directions to the nearest urgent care on [address]. Pt states, " Do I need to go now?" Informed Pt once again that yes, she needed to go immediately. Pt stated, "okay".   "

## 2019-04-05 NOTE — ED NOTES
Patient diagnosed with DVT in left leg on February 15 and put on Eloquis  Patient stated 5 days ago on Monday left leg started hurting from calf up to left groin  Left leg with cap refill less than 3 sec and warm

## 2019-04-05 NOTE — ED PROVIDER NOTES
"Encounter Date: 2019    SCRIBE #1 NOTE: I, Marguerite Pink, am scribing for, and in the presence of,  Dr. Dey. I have scribed the following portions of the note - Other sections scribed: HPI, ROS, PE.       History     Chief Complaint   Patient presents with    Leg Pain     pt states,"Left leg pain for two days."     CC: leg pain  24 y.o female presents with left leg pain for 2 days. She reports she was diagnosed with a blood clot in the same leg on 19. She was seen by a hematologist and states the cause is unknown,  She was prescribed Eliquis and is taking it as prescribed. She also notes mild swelling. She denies chest pain or SOB. She is not on birth control. No recent long distance travel. 5/10 pain rating.  No trauma    The history is provided by the patient. No  was used.     Review of patient's allergies indicates:  No Known Allergies  History reviewed. No pertinent past medical history.  History reviewed. No pertinent surgical history.  Family History   Problem Relation Age of Onset    Hypertension Mother     Hyperlipidemia Mother     Hypertension Father     Hyperlipidemia Father     Hypertension Maternal Uncle     Hypertension Paternal Uncle     Stroke Maternal Grandfather     Diabetes Paternal Grandmother     Cancer Paternal Grandfather 80        Pancreatic     Social History     Tobacco Use    Smoking status: Former Smoker     Last attempt to quit: 2013     Years since quittin.9    Smokeless tobacco: Never Used   Substance Use Topics    Alcohol use: Yes     Alcohol/week: 10.8 oz     Types: 18 Cans of beer per week    Drug use: No     Review of Systems   Respiratory: Negative for shortness of breath.    Cardiovascular: Positive for leg swelling (left ). Negative for chest pain.        Left leg pain    Musculoskeletal: Negative for gait problem.   Skin: Negative for color change.   Psychiatric/Behavioral: The patient is nervous/anxious.        Physical " Exam     Initial Vitals [04/05/19 1544]   BP Pulse Resp Temp SpO2   (!) 176/91 90 16 98.8 °F (37.1 °C) 100 %      MAP       --         Physical Exam    Nursing note and vitals reviewed.  Constitutional: She appears well-developed and well-nourished. She is not diaphoretic. No distress.   HENT:   Head: Normocephalic and atraumatic.   Eyes: EOM are normal.   Cardiovascular: Intact distal pulses.   Pulmonary/Chest: No respiratory distress.   Musculoskeletal: Normal range of motion. She exhibits no tenderness.        Left lower leg: She exhibits no tenderness, no bony tenderness, no swelling, no edema, no deformity and no laceration.        Legs:  Neurological: She is alert and oriented to person, place, and time. No cranial nerve deficit or sensory deficit.   Skin: Skin is warm and dry. Capillary refill takes less than 2 seconds.   Psychiatric: She has a normal mood and affect. Her behavior is normal.         ED Course   Procedures  Labs Reviewed   POCT URINE PREGNANCY          Imaging Results          US Lower Extremity Veins Left (Final result)  Result time 04/05/19 16:56:02    Final result by Victor Hugo Vargas MD (04/05/19 16:56:02)                 Impression:      No evidence of deep venous thrombosis in the left lower extremity.    Interval resolution of previous popliteal thrombus.      Electronically signed by: Victor Hugo Vargas MD  Date:    04/05/2019  Time:    16:56             Narrative:    EXAMINATION:  US LOWER EXTREMITY VEINS LEFT    CLINICAL HISTORY:  Other specified soft tissue disorders    TECHNIQUE:  Duplex and color flow Doppler evaluation and graded compression of the left lower extremity veins was performed.    COMPARISON:  02/22/2019    FINDINGS:  Duplex and color flow Doppler evaluation does not reveal any evidence of acute venous thrombosis in the common femoral, superficial femoral, greater saphenous, popliteal, peroneal, anterior tibial and posterior tibial veins of the left lower extremity.   There is no reflux to suggest valvular incompetence.                                 Medical Decision Making:   Initial Assessment:   24 y.o female with a hx of DVT to the left popliteal presents with left leg pain for 2 days. No significant exam findings. Good pulse. No erythema, edema, or warmth noted to the leg. No calf tenderness.   Clinical Tests:   Lab Tests: Ordered and Reviewed  Radiological Study: Ordered and Reviewed  ED Management:  I will order an ultrasound and UPT.  Ultrasound shows no evidence of DVT.  Patient was reassured.  She admits that she is very nervous.  She was advised to continue taking the Eliquis            Scribe Attestation:   Scribe #1: I performed the above scribed service and the documentation accurately describes the services I performed. I attest to the accuracy of the note.       I, Dr. Rachelle Dey, personally performed the services described in this documentation. All medical record entries made by the scribe were at my direction and in my presence.  I have reviewed the chart and agree that the record reflects my personal performance and is accurate and complete. Rachelle Dey MD.  5:40 PM 04/05/2019             Clinical Impression:     1. Left leg swelling                                 Rachelle Dey MD  04/05/19 1315

## 2019-04-30 ENCOUNTER — PATIENT MESSAGE (OUTPATIENT)
Dept: FAMILY MEDICINE | Facility: CLINIC | Age: 24
End: 2019-04-30

## 2019-05-20 DIAGNOSIS — Z34.90 PREGNANCY, UNSPECIFIED GESTATIONAL AGE: Primary | ICD-10-CM

## 2019-05-21 ENCOUNTER — PROCEDURE VISIT (OUTPATIENT)
Dept: OBSTETRICS AND GYNECOLOGY | Facility: CLINIC | Age: 24
End: 2019-05-21
Payer: COMMERCIAL

## 2019-05-21 ENCOUNTER — INITIAL PRENATAL (OUTPATIENT)
Dept: OBSTETRICS AND GYNECOLOGY | Facility: CLINIC | Age: 24
End: 2019-05-21
Attending: STUDENT IN AN ORGANIZED HEALTH CARE EDUCATION/TRAINING PROGRAM
Payer: COMMERCIAL

## 2019-05-21 VITALS
WEIGHT: 140.44 LBS | SYSTOLIC BLOOD PRESSURE: 118 MMHG | DIASTOLIC BLOOD PRESSURE: 74 MMHG | BODY MASS INDEX: 23.37 KG/M2

## 2019-05-21 DIAGNOSIS — N91.2 AMENORRHEA: ICD-10-CM

## 2019-05-21 DIAGNOSIS — O22.30 DVT (DEEP VEIN THROMBOSIS) IN PREGNANCY: Primary | ICD-10-CM

## 2019-05-21 DIAGNOSIS — Z36.89 ESTABLISH GESTATIONAL AGE, ULTRASOUND: ICD-10-CM

## 2019-05-21 DIAGNOSIS — Z34.90 PREGNANCY, UNSPECIFIED GESTATIONAL AGE: ICD-10-CM

## 2019-05-21 PROCEDURE — 99999 PR PBB SHADOW E&M-EST. PATIENT-LVL III: ICD-10-PCS | Mod: PBBFAC,,, | Performed by: STUDENT IN AN ORGANIZED HEALTH CARE EDUCATION/TRAINING PROGRAM

## 2019-05-21 PROCEDURE — 76801 OB US < 14 WKS SINGLE FETUS: CPT | Mod: S$GLB,,, | Performed by: OBSTETRICS & GYNECOLOGY

## 2019-05-21 PROCEDURE — 99999 PR PBB SHADOW E&M-EST. PATIENT-LVL III: CPT | Mod: PBBFAC,,, | Performed by: STUDENT IN AN ORGANIZED HEALTH CARE EDUCATION/TRAINING PROGRAM

## 2019-05-21 PROCEDURE — 0502F SUBSEQUENT PRENATAL CARE: CPT | Mod: CPTII,S$GLB,, | Performed by: STUDENT IN AN ORGANIZED HEALTH CARE EDUCATION/TRAINING PROGRAM

## 2019-05-21 PROCEDURE — 0502F PR SUBSEQUENT PRENATAL CARE: ICD-10-PCS | Mod: CPTII,S$GLB,, | Performed by: STUDENT IN AN ORGANIZED HEALTH CARE EDUCATION/TRAINING PROGRAM

## 2019-05-21 PROCEDURE — 76801 PR US, OB <14WKS, TRANSABD, SINGLE GESTATION: ICD-10-PCS | Mod: S$GLB,,, | Performed by: OBSTETRICS & GYNECOLOGY

## 2019-05-21 RX ORDER — ENOXAPARIN SODIUM 100 MG/ML
40 INJECTION SUBCUTANEOUS
COMMUNITY
End: 2019-06-03 | Stop reason: SDUPTHER

## 2019-05-27 ENCOUNTER — TELEPHONE (OUTPATIENT)
Dept: MATERNAL FETAL MEDICINE | Facility: CLINIC | Age: 24
End: 2019-05-27

## 2019-05-27 NOTE — PROGRESS NOTES
Chief Complaint: Absence of Menses     HPI:      Niesha Pierre is a 24 y.o.  who presents complaining of absence of menses.  She reports she was initially seen fo positive pregnancy test and there was a gestational sac in the uterus.  Has not had follow up U/S.  Denies cramping or bleeding.    GYN Hx:  LMP 3/12/19.  Menarche 11 with cycles occurring every 28 days and menses lasting 5 days.     Has a history of L leg DVT currently on Lovenox 40 SQ.  DVT diagnosed earlier this year.    Also with history of anxiety and depression - came off of her meds when she found out she was pregnant.    History reviewed. No pertinent past medical history.  History reviewed. No pertinent surgical history.  Social History     Tobacco Use    Smoking status: Former Smoker     Last attempt to quit: 2013     Years since quittin.1    Smokeless tobacco: Never Used   Substance Use Topics    Alcohol use: Yes     Alcohol/week: 10.8 oz     Types: 18 Cans of beer per week    Drug use: No     Family History   Problem Relation Age of Onset    Hypertension Mother     Hyperlipidemia Mother     Hypertension Father     Hyperlipidemia Father     Hypertension Maternal Uncle     Hypertension Paternal Uncle     Stroke Maternal Grandfather     Diabetes Paternal Grandmother     Cancer Paternal Grandfather 80        Pancreatic     OB History    Para Term  AB Living   1             SAB TAB Ectopic Multiple Live Births                  # Outcome Date GA Lbr Akin/2nd Weight Sex Delivery Anes PTL Lv   1 Current                Current Outpatient Medications:     enoxaparin (LOVENOX) 40 mg/0.4 mL Syrg, Inject 40 mg into the skin., Disp: , Rfl:     prenatal 25/iron fum/folic/dha (PRENATAL-1 ORAL), Take by mouth., Disp: , Rfl:     desonide (DESOWEN) 0.05 % lotion, Apply topically 2 (two) times daily. No longer than 2 weeks, Disp: 1 Bottle, Rfl: 1  ROS:     GENERAL: Denies weight gain or weight loss. Feeling well  overall.   SKIN: Denies rash or lesions.   BREASTS: Denies lumps or nipple discharge. + tenderness.  ABDOMEN: Denies abdominal pain, constipation, diarrhea. nausea and no vomiting  URINARY: Denies dysuria, hematuria. + frequency.  NEUROLOGIC: Denies syncope or weakness.   PSYCHIATRIC: Denies depression, anxiety or mood swings.    Physical Exam:      PHYSICAL EXAM:  /74   Wt 63.7 kg (140 lb 6.9 oz)   LMP 2019   BMI 23.37 kg/m²   Body mass index is 23.37 kg/m².     APPEARANCE: Well nourished, well developed, in no acute distress.  PSYCH: Appropriate mood and affect.  EXTREMITIES: No edema.    Assessment/Plan:     pregnancy    Counselin. Patient was counseled today on proper weight gain based on the [place] [facility]'s recommendations based on her pre-pregnancy weight.   2. Discussed foods to avoid in pregnancy (i.e. sushi, fish that are high in mercury, deli meat, and unpasteurized cheeses).   3. Discussed prenatal vitamin options and folic acid (i.e. stool softener, DHA).   4. Optional genetic testing discussed - patient will let us know if she is interested.   5. Optional carrier screening discussed - patient will let us know if she is interested.  5. Safety of exercise discussed with patient, and continued active lifestyle encouraged.  6. Zika virus precautions for both patient and her spouse.  7. Normal course of prenatal care reviewed.  8. Medications safe in pregnancy discussed and list provided.  9.  DVT:  MFM referral  10.  Anxiety and depression:  Patient would like to monitor for now.    30 minutes of face-to-face discussion occurred during today's visit.     Use of the [facility] Patient Portal discussed and encouraged during today's visit.

## 2019-05-27 NOTE — TELEPHONE ENCOUNTER
Unable to leave message for patient (mailbox full) to schedule MFM consult. Will attempt to call patient again tomorrow to schedule the appointment.

## 2019-06-03 ENCOUNTER — PATIENT MESSAGE (OUTPATIENT)
Dept: OBSTETRICS AND GYNECOLOGY | Facility: CLINIC | Age: 24
End: 2019-06-03

## 2019-06-03 ENCOUNTER — TELEPHONE (OUTPATIENT)
Dept: OBSTETRICS AND GYNECOLOGY | Facility: CLINIC | Age: 24
End: 2019-06-03

## 2019-06-03 RX ORDER — ENOXAPARIN SODIUM 100 MG/ML
40 INJECTION SUBCUTANEOUS EVERY 12 HOURS
Qty: 0.4 ML | Refills: 6 | Status: SHIPPED | OUTPATIENT
Start: 2019-06-03 | End: 2019-06-07

## 2019-06-03 NOTE — TELEPHONE ENCOUNTER
Spoke with the pharmacist. Wanted to clarify quantity of syringes for prescription. Confirmed that medication is for a 30 day supply which is 60 syringes.

## 2019-06-07 ENCOUNTER — TELEPHONE (OUTPATIENT)
Dept: OBSTETRICS AND GYNECOLOGY | Facility: CLINIC | Age: 24
End: 2019-06-07

## 2019-06-07 ENCOUNTER — LAB VISIT (OUTPATIENT)
Dept: LAB | Facility: HOSPITAL | Age: 24
End: 2019-06-07
Attending: STUDENT IN AN ORGANIZED HEALTH CARE EDUCATION/TRAINING PROGRAM
Payer: COMMERCIAL

## 2019-06-07 ENCOUNTER — INITIAL PRENATAL (OUTPATIENT)
Dept: OBSTETRICS AND GYNECOLOGY | Facility: CLINIC | Age: 24
End: 2019-06-07
Attending: STUDENT IN AN ORGANIZED HEALTH CARE EDUCATION/TRAINING PROGRAM
Payer: COMMERCIAL

## 2019-06-07 VITALS
SYSTOLIC BLOOD PRESSURE: 108 MMHG | WEIGHT: 137.81 LBS | DIASTOLIC BLOOD PRESSURE: 76 MMHG | BODY MASS INDEX: 22.93 KG/M2

## 2019-06-07 DIAGNOSIS — Z3A.09 9 WEEKS GESTATION OF PREGNANCY: ICD-10-CM

## 2019-06-07 DIAGNOSIS — Z3A.09 9 WEEKS GESTATION OF PREGNANCY: Primary | ICD-10-CM

## 2019-06-07 DIAGNOSIS — Z11.3 SCREEN FOR STD (SEXUALLY TRANSMITTED DISEASE): ICD-10-CM

## 2019-06-07 DIAGNOSIS — Z12.4 SCREENING FOR CERVICAL CANCER: ICD-10-CM

## 2019-06-07 LAB
ABO + RH BLD: NORMAL
ANION GAP SERPL CALC-SCNC: 11 MMOL/L (ref 8–16)
BASOPHILS # BLD AUTO: 0.05 K/UL (ref 0–0.2)
BASOPHILS NFR BLD: 0.4 % (ref 0–1.9)
BLD GP AB SCN CELLS X3 SERPL QL: NORMAL
BUN SERPL-MCNC: 6 MG/DL (ref 6–20)
CALCIUM SERPL-MCNC: 10.3 MG/DL (ref 8.7–10.5)
CHLORIDE SERPL-SCNC: 105 MMOL/L (ref 95–110)
CO2 SERPL-SCNC: 20 MMOL/L (ref 23–29)
CREAT SERPL-MCNC: 0.7 MG/DL (ref 0.5–1.4)
DIFFERENTIAL METHOD: ABNORMAL
EOSINOPHIL # BLD AUTO: 0.1 K/UL (ref 0–0.5)
EOSINOPHIL NFR BLD: 1 % (ref 0–8)
ERYTHROCYTE [DISTWIDTH] IN BLOOD BY AUTOMATED COUNT: 11.9 % (ref 11.5–14.5)
EST. GFR  (AFRICAN AMERICAN): >60 ML/MIN/1.73 M^2
EST. GFR  (NON AFRICAN AMERICAN): >60 ML/MIN/1.73 M^2
GLUCOSE SERPL-MCNC: 87 MG/DL (ref 70–110)
HCT VFR BLD AUTO: 40.4 % (ref 37–48.5)
HGB BLD-MCNC: 13.7 G/DL (ref 12–16)
IMM GRANULOCYTES # BLD AUTO: 0.05 K/UL (ref 0–0.04)
IMM GRANULOCYTES NFR BLD AUTO: 0.4 % (ref 0–0.5)
LYMPHOCYTES # BLD AUTO: 1.3 K/UL (ref 1–4.8)
LYMPHOCYTES NFR BLD: 10.4 % (ref 18–48)
MCH RBC QN AUTO: 29.4 PG (ref 27–31)
MCHC RBC AUTO-ENTMCNC: 33.9 G/DL (ref 32–36)
MCV RBC AUTO: 87 FL (ref 82–98)
MONOCYTES # BLD AUTO: 0.8 K/UL (ref 0.3–1)
MONOCYTES NFR BLD: 6.3 % (ref 4–15)
NEUTROPHILS # BLD AUTO: 10.1 K/UL (ref 1.8–7.7)
NEUTROPHILS NFR BLD: 81.5 % (ref 38–73)
NRBC BLD-RTO: 0 /100 WBC
PLATELET # BLD AUTO: 267 K/UL (ref 150–350)
PMV BLD AUTO: 10.5 FL (ref 9.2–12.9)
POTASSIUM SERPL-SCNC: 4.2 MMOL/L (ref 3.5–5.1)
RBC # BLD AUTO: 4.66 M/UL (ref 4–5.4)
SODIUM SERPL-SCNC: 136 MMOL/L (ref 136–145)
WBC # BLD AUTO: 12.41 K/UL (ref 3.9–12.7)

## 2019-06-07 PROCEDURE — 86592 SYPHILIS TEST NON-TREP QUAL: CPT

## 2019-06-07 PROCEDURE — 99999 PR PBB SHADOW E&M-EST. PATIENT-LVL III: ICD-10-PCS | Mod: PBBFAC,,, | Performed by: STUDENT IN AN ORGANIZED HEALTH CARE EDUCATION/TRAINING PROGRAM

## 2019-06-07 PROCEDURE — 87491 CHLMYD TRACH DNA AMP PROBE: CPT

## 2019-06-07 PROCEDURE — 86703 HIV-1/HIV-2 1 RESULT ANTBDY: CPT

## 2019-06-07 PROCEDURE — 88175 CYTOPATH C/V AUTO FLUID REDO: CPT

## 2019-06-07 PROCEDURE — 99999 PR PBB SHADOW E&M-EST. PATIENT-LVL III: CPT | Mod: PBBFAC,,, | Performed by: STUDENT IN AN ORGANIZED HEALTH CARE EDUCATION/TRAINING PROGRAM

## 2019-06-07 PROCEDURE — 0502F PR SUBSEQUENT PRENATAL CARE: ICD-10-PCS | Mod: CPTII,S$GLB,, | Performed by: STUDENT IN AN ORGANIZED HEALTH CARE EDUCATION/TRAINING PROGRAM

## 2019-06-07 PROCEDURE — 87086 URINE CULTURE/COLONY COUNT: CPT

## 2019-06-07 PROCEDURE — 0502F SUBSEQUENT PRENATAL CARE: CPT | Mod: CPTII,S$GLB,, | Performed by: STUDENT IN AN ORGANIZED HEALTH CARE EDUCATION/TRAINING PROGRAM

## 2019-06-07 PROCEDURE — 87340 HEPATITIS B SURFACE AG IA: CPT

## 2019-06-07 PROCEDURE — 86762 RUBELLA ANTIBODY: CPT

## 2019-06-07 PROCEDURE — 86850 RBC ANTIBODY SCREEN: CPT

## 2019-06-07 PROCEDURE — 80048 BASIC METABOLIC PNL TOTAL CA: CPT

## 2019-06-07 PROCEDURE — 85025 COMPLETE CBC W/AUTO DIFF WBC: CPT

## 2019-06-07 RX ORDER — ENOXAPARIN SODIUM 100 MG/ML
40 INJECTION SUBCUTANEOUS DAILY
Qty: 0.4 ML | Refills: 6 | Status: SHIPPED | OUTPATIENT
Start: 2019-06-07 | End: 2019-07-25 | Stop reason: SDUPTHER

## 2019-06-07 NOTE — PROGRESS NOTES
Chief Complaint: Initial OB Visit     HPI:      Niesha Pierre is a 24 y.o.  who presents today for initial OB visit.  She reports constipation.  Some nausea.  No emesis. Denies cramping or bleeding.    History reviewed. No pertinent past medical history.  History reviewed. No pertinent surgical history.  Social History     Tobacco Use    Smoking status: Former Smoker     Last attempt to quit: 2013     Years since quittin.1    Smokeless tobacco: Never Used   Substance Use Topics    Alcohol use: Yes     Alcohol/week: 10.8 oz     Types: 18 Cans of beer per week    Drug use: No     Family History   Problem Relation Age of Onset    Hypertension Mother     Hyperlipidemia Mother     Hypertension Father     Hyperlipidemia Father     Hypertension Maternal Uncle     Hypertension Paternal Uncle     Stroke Maternal Grandfather     Diabetes Paternal Grandmother     Cancer Paternal Grandfather 80        Pancreatic     OB History    Para Term  AB Living   1             SAB TAB Ectopic Multiple Live Births                  # Outcome Date GA Lbr Akin/2nd Weight Sex Delivery Anes PTL Lv   1 Current                ROS:     GENERAL: Denies weight gain or weight loss. Feeling well overall.   SKIN: Denies rash or lesions.   BREASTS: Denies lumps or nipple discharge. + tenderness.  ABDOMEN: Denies abdominal pain, constipation, diarrhea. nausea and no vomiting  URINARY: Denies dysuria, hematuria. + frequency.  NEUROLOGIC: Denies syncope or weakness.   PSYCHIATRIC: Denies depression, anxiety or mood swings.    Physical Exam:      PHYSICAL EXAM:  /76   Wt 62.5 kg (137 lb 12.6 oz)   LMP 2019   BMI 22.93 kg/m²   Body mass index is 22.93 kg/m².     APPEARANCE: Well nourished, well developed, in no acute distress.  PSYCH: Appropriate mood and affect.  NECK:  Supple, no thyromegaly.  BREASTS:  No masses, no nipple discharge.  CARDIOVASCULAR:  Regular rate and rhythm.  LUNGS:  Clear  to auscultation bilaterally.  ABDOMEN:  Soft, nontender.  GENITOURINARY:  External genitalia normal in appearance, normal perineal body, normal urethral meatus.  Vagina with scant discharge, no blood.  No lesions.  Cervix without lesion, C/T/H.  Uterus 10 week.  Adnexa without masses or TTP.    EXTREMITIES: No edema.    Assessment/Plan:       ICD-10-CM ICD-9-CM    1. 9 weeks gestation of pregnancy Z3A.09 V22.2 HIV 1/2 Ag/Ab (4th Gen)      RPR      Hepatitis B surface antigen      Type & Screen      Rubella antibody, IgG      Urine culture      CBC auto differential      Basic metabolic panel      US OB/GYN Procedure ([facility]) - Extended List   2. Screening for cervical cancer Z12.4 V76.2 Liquid-based pap smear, screening   3. Screen for STD (sexually transmitted disease) Z11.3 V74.5 C. trachomatis/N. gonorrhoeae by AMP DNA [facility]; Cervicovaginal         Counselin. Prenatal labs ordered - will follow up results.  Continue prenatal vitamins.  All questions answered.  2. Nausea/emesis:  Vitamin B6 and unisom.  She will call if symptoms worse or do not improve.   3. Genetic Screening:  NT and serum screen.  Will set up.  4.  Also needs MFM consultation.    5. Normal course of prenatal care reviewed.  6. RTC in 4 weeks or sooner if needed.    Use of the [facility] Patient Portal discussed and encouraged during today's visit.

## 2019-06-08 LAB
C TRACH DNA SPEC QL NAA+PROBE: NOT DETECTED
N GONORRHOEA DNA SPEC QL NAA+PROBE: NOT DETECTED
RPR SER QL: NORMAL

## 2019-06-09 LAB — BACTERIA UR CULT: NORMAL

## 2019-06-10 ENCOUNTER — TELEPHONE (OUTPATIENT)
Dept: MATERNAL FETAL MEDICINE | Facility: CLINIC | Age: 24
End: 2019-06-10

## 2019-06-10 ENCOUNTER — PATIENT MESSAGE (OUTPATIENT)
Dept: OBSTETRICS AND GYNECOLOGY | Facility: CLINIC | Age: 24
End: 2019-06-10

## 2019-06-10 LAB
HBV SURFACE AG SERPL QL IA: NEGATIVE
HIV 1+2 AB+HIV1 P24 AG SERPL QL IA: NEGATIVE
RUBV IGG SER-ACNC: 41.7 IU/ML
RUBV IGG SER-IMP: REACTIVE

## 2019-06-10 RX ORDER — PROMETHAZINE HYDROCHLORIDE 25 MG/1
12.5 TABLET ORAL EVERY 6 HOURS PRN
Qty: 20 TABLET | Refills: 0 | Status: SHIPPED | OUTPATIENT
Start: 2019-06-10 | End: 2020-10-09

## 2019-06-10 RX ORDER — ONDANSETRON 4 MG/1
4 TABLET, FILM COATED ORAL EVERY 6 HOURS PRN
Qty: 20 TABLET | Refills: 0 | Status: SHIPPED | OUTPATIENT
Start: 2019-06-10 | End: 2019-10-02 | Stop reason: SDUPTHER

## 2019-06-10 NOTE — TELEPHONE ENCOUNTER
Please call patient.  Sent over the Vit B6 and unisom which she can continue over the counter if it is not covered.  She then has the phenergan she can take by mouth if needed.  The zofran she can use if she starts throwing up.  Thanks so much!

## 2019-06-19 ENCOUNTER — HOSPITAL ENCOUNTER (OUTPATIENT)
Dept: RADIOLOGY | Facility: HOSPITAL | Age: 24
Discharge: HOME | End: 2019-06-19
Attending: INTERNAL MEDICINE
Payer: COMMERCIAL

## 2019-06-19 DIAGNOSIS — I82.432 ACUTE DEEP VEIN THROMBOSIS (DVT) OF POPLITEAL VEIN OF LEFT LOWER EXTREMITY: ICD-10-CM

## 2019-06-19 PROCEDURE — 93971 EXTREMITY STUDY: CPT | Mod: TC,LT

## 2019-06-19 PROCEDURE — 93971 US LOWER EXTREMITY VEINS LEFT: ICD-10-PCS | Mod: 26,LT,, | Performed by: RADIOLOGY

## 2019-06-19 PROCEDURE — 93971 EXTREMITY STUDY: CPT | Mod: 26,LT,, | Performed by: RADIOLOGY

## 2019-06-20 ENCOUNTER — OFFICE VISIT (OUTPATIENT)
Dept: HEMATOLOGY/ONCOLOGY | Facility: CLINIC | Age: 24
End: 2019-06-20
Payer: COMMERCIAL

## 2019-06-20 VITALS
BODY MASS INDEX: 22.92 KG/M2 | SYSTOLIC BLOOD PRESSURE: 114 MMHG | DIASTOLIC BLOOD PRESSURE: 66 MMHG | HEART RATE: 81 BPM | WEIGHT: 137.56 LBS | OXYGEN SATURATION: 99 % | TEMPERATURE: 99 F | HEIGHT: 65 IN

## 2019-06-20 DIAGNOSIS — I82.432 ACUTE DEEP VEIN THROMBOSIS (DVT) OF POPLITEAL VEIN OF LEFT LOWER EXTREMITY: Primary | ICD-10-CM

## 2019-06-20 PROCEDURE — 99999 PR PBB SHADOW E&M-EST. PATIENT-LVL III: ICD-10-PCS | Mod: PBBFAC,,, | Performed by: INTERNAL MEDICINE

## 2019-06-20 PROCEDURE — 99999 PR PBB SHADOW E&M-EST. PATIENT-LVL III: CPT | Mod: PBBFAC,,, | Performed by: INTERNAL MEDICINE

## 2019-06-20 PROCEDURE — 99213 PR OFFICE/OUTPT VISIT, EST, LEVL III, 20-29 MIN: ICD-10-PCS | Mod: S$GLB,,, | Performed by: INTERNAL MEDICINE

## 2019-06-20 PROCEDURE — 99213 OFFICE O/P EST LOW 20 MIN: CPT | Mod: S$GLB,,, | Performed by: INTERNAL MEDICINE

## 2019-06-20 NOTE — PROGRESS NOTES
Chief Complaint :   DVT    Hx of Present illness :  Patient is a 24 y.o. year old female who presents to the clinic today for   Oncology evaluation. Was experiencing pain left leg. Evaluated by physician. U/S revealed DVT in left Popliteal vein, in Feb 2019. Was on BC pill for three years. Stopped in July 2018. Clarita she is pregnant . Eleven weeks. Started on Lovenox by GYN.       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     Transfusion :  None    Menstrual & obstetric Hx :  Nulliparous.   Age of menarche:  12  Age of first pregnancy: N/A  Lactation history: N/a  Age of menopause: N/A  HRT: None    Present Meds :   Medication List with Changes/Refills   Current Medications    DESONIDE (DESOWEN) 0.05 % LOTION    Apply topically 2 (two) times daily. No longer than 2 weeks    DOXYLAMINE-PYRIDOXINE, VIT B6, (BONJESTA) 20-20 MG TBID    Take 1 tablet by mouth 2 (two) times daily.    ENOXAPARIN (LOVENOX) 40 MG/0.4 ML SYRG    Inject 0.4 mLs (40 mg total) into the skin once daily.    ONDANSETRON (ZOFRAN) 4 MG TABLET    Take 1 tablet (4 mg total) by mouth every 6 (six) hours as needed for Nausea.    PRENATAL 25/IRON FUM/FOLIC/DHA (PRENATAL-1 ORAL)    Take by mouth.    PROMETHAZINE (PHENERGAN) 25 MG TABLET    Take 0.5 tablets (12.5 mg total) by mouth every 6 (six) hours as needed for Nausea.       Past Medical Hx :  No prior Hx of DVT or PE. No Hx of DM. Hypertension,  Lupus, rheumatoid arthritis, Thyroid dysfunction. No Hx of CVA or Seizure disorder.     Past Medical Hx :  No past medical history on file.    Travel Hx :  None    Immunization :  Immunization History   Administered Date(s) Administered    DTP / HiB 1995, 1995, 1995    DTaP 04/24/1996, 07/27/1999    HIB 04/24/1996    Hepatitis B 1995, 1995, 1995    Influenza - Quadrivalent - PF 11/16/2015, 10/06/2016    MMR 01/31/1996, 07/27/1999,  2015, 2016    Meningococcal Conjugate (MCV4P) 2007, 2015    OPV 1995, 1995, 1995, 1999    Tdap 2007    Varicella 2007, 2009       Family Hx :  Family History   Problem Relation Age of Onset    Hypertension Mother     Hyperlipidemia Mother     Hypertension Father     Hyperlipidemia Father     Hypertension Maternal Uncle     Hypertension Paternal Uncle     Stroke Maternal Grandfather     Diabetes Paternal Grandmother     Cancer Paternal Grandfather 80        Pancreatic       Social Hx :  Social History     Socioeconomic History    Marital status:      Spouse name: Not on file    Number of children: Not on file    Years of education: Not on file    Highest education level: Not on file   Occupational History    Not on file   Social Needs    Financial resource strain: Not on file    Food insecurity:     Worry: Not on file     Inability: Not on file    Transportation needs:     Medical: Not on file     Non-medical: Not on file   Tobacco Use    Smoking status: Former Smoker     Last attempt to quit: 2013     Years since quittin.2    Smokeless tobacco: Never Used   Substance and Sexual Activity    Alcohol use: Yes     Alcohol/week: 10.8 oz     Types: 18 Cans of beer per week    Drug use: No    Sexual activity: Yes   Lifestyle    Physical activity:     Days per week: Not on file     Minutes per session: Not on file    Stress: Not on file   Relationships    Social connections:     Talks on phone: Not on file     Gets together: Not on file     Attends Church service: Not on file     Active member of club or organization: Not on file     Attends meetings of clubs or organizations: Not on file     Relationship status: Not on file   Other Topics Concern    Not on file   Social History Narrative    Not on file       Surgery :  None    Symptoms :    Review of Systems   Constitutional: Negative for chills, fever,  malaise/fatigue and weight loss.   HENT: Positive for congestion. Negative for hearing loss, nosebleeds, sore throat and tinnitus.    Eyes: Negative for blurred vision, double vision and photophobia.   Respiratory: Negative.  Negative for cough, hemoptysis, sputum production and shortness of breath.    Cardiovascular: Negative.  Negative for chest pain, palpitations, orthopnea, claudication and leg swelling.   Gastrointestinal: Positive for constipation (Occasional.). Negative for abdominal pain, blood in stool, diarrhea, heartburn, nausea and vomiting.        Has noted blood in stool when straining   Genitourinary: Negative.  Negative for dysuria, flank pain, frequency, hematuria and urgency.   Musculoskeletal: Positive for back pain. Negative for falls, joint pain, myalgias and neck pain.        Sx of flu. Legs not hurting as much.   Skin: Negative for itching and rash.   Neurological: Negative for dizziness, tingling, tremors, sensory change and headaches.        Sometimes tingling of feet.    Endo/Heme/Allergies: Negative for environmental allergies and polydipsia. Does not bruise/bleed easily.   Psychiatric/Behavioral: Negative for depression. The patient is nervous/anxious. The patient does not have insomnia.      No new Sx    Physical Exam :   Physical Exam   Constitutional: She is oriented to person, place, and time and well-developed, well-nourished, and in no distress. Vital signs are normal. No distress.   HENT:   Head: Normocephalic and atraumatic.   Right Ear: External ear normal.   Left Ear: External ear normal.   Nose: Nose normal.   Mouth/Throat: Oropharynx is clear and moist. No oropharyngeal exudate.   Eyes: Pupils are equal, round, and reactive to light. Conjunctivae, EOM and lids are normal. Lids are everted and swept, no foreign bodies found. Right eye exhibits no discharge. Left eye exhibits no discharge. No scleral icterus.   Neck: Trachea normal, normal range of motion, full passive range of  motion without pain and phonation normal. Neck supple. Normal carotid pulses, no hepatojugular reflux and no JVD present. No tracheal tenderness present. Carotid bruit is not present. No tracheal deviation present. No thyroid mass and no thyromegaly present.   Cardiovascular: Normal rate, regular rhythm, normal heart sounds, intact distal pulses and normal pulses. PMI is not displaced. Exam reveals no gallop.   No murmur heard.  Pulmonary/Chest: Effort normal and breath sounds normal. No stridor. No apnea. No respiratory distress. She has no wheezes. She has no rales. She exhibits no tenderness.   Abdominal: Soft. Normal appearance, normal aorta and bowel sounds are normal. She exhibits no distension and no mass. There is no hepatosplenomegaly. There is no tenderness. There is no rebound, no guarding and no CVA tenderness. No hernia.   Musculoskeletal: Normal range of motion. She exhibits no edema, tenderness or deformity.   Lymphadenopathy:        Head (right side): No submental, no submandibular, no tonsillar, no preauricular, no posterior auricular and no occipital adenopathy present.        Head (left side): No submental, no submandibular, no tonsillar, no preauricular, no posterior auricular and no occipital adenopathy present.     She has no cervical adenopathy.     She has no axillary adenopathy.        Right: No inguinal, no supraclavicular and no epitrochlear adenopathy present.        Left: No inguinal, no supraclavicular and no epitrochlear adenopathy present.   Neurological: She is alert and oriented to person, place, and time. She has normal sensation, normal strength, normal reflexes and intact cranial nerves. No cranial nerve deficit. She exhibits normal muscle tone. Gait normal. Coordination normal. GCS score is 15.   Skin: Skin is warm, dry and intact. No rash noted. She is not diaphoretic. No cyanosis or erythema. No pallor. Nails show no clubbing.   Psychiatric: Mood, memory, affect and judgment  normal.     NoNew Finding.      Labs & Imaging : 02/14/09 : acute non occlusive DVT in left popliteal Vein.  March 2019 : Coag labs normal.    04/05/2019 :   Interval resolution of previous popliteal vein thrombus.  No evidence of DVT left lower extremity.    Dx :  Acute DVT left Popliteal vein.  resolved      Assessment & Plan:  Reviewed with patient and her mother.  Lovenox therapy supervised by GYN.   Stay active. Anti embolic stockings for long distance travel. Oncology followup as needed.    Advance Care Planning     Power of   I initiated the process of advance care planning today and explained the importance of this process to the patient.  I introduced the concept of advance directives to the patient, as well. Then the patient received detailed information about the importance of designating a Health Care Power of  (HCPOA). She was also instructed to communicate with this person about their wishes for future healthcare, should she become sick and lose decision-making capacity. The patient has not  previously appointed a HCPOA.          Living Will  During this visit, I engaged the patient in the advance care planning process.  The patient and I reviewed the role for advance directives and their purpose in directing future healthcare if the patient's unable to speak for him/herself.  At this point in time, the patient does have full decision-making capacity.  We discussed different extreme health states that she could experience, and reviewed what kind of medical care she would want in those situations.  The patient communicated that if she were comatose and had little chance of a meaningful recovery, she  want machines/life-sustaining treatments used.  In addition to the above preference, other important end-of-life issues for the patient.  The patient  HAS NOT  completed a living will to reflect these preferences.  The patient   HAS NOT  already designated a healthcare power of  to  make decisions on her behalf.       Brochures given

## 2019-06-27 ENCOUNTER — INITIAL CONSULT (OUTPATIENT)
Dept: MATERNAL FETAL MEDICINE | Facility: CLINIC | Age: 24
End: 2019-06-27
Payer: COMMERCIAL

## 2019-06-27 ENCOUNTER — LAB VISIT (OUTPATIENT)
Dept: LAB | Facility: OTHER | Age: 24
End: 2019-06-27
Attending: STUDENT IN AN ORGANIZED HEALTH CARE EDUCATION/TRAINING PROGRAM
Payer: COMMERCIAL

## 2019-06-27 ENCOUNTER — ROUTINE PRENATAL (OUTPATIENT)
Dept: OBSTETRICS AND GYNECOLOGY | Facility: CLINIC | Age: 24
End: 2019-06-27
Attending: STUDENT IN AN ORGANIZED HEALTH CARE EDUCATION/TRAINING PROGRAM
Payer: COMMERCIAL

## 2019-06-27 VITALS
BODY MASS INDEX: 22.82 KG/M2 | WEIGHT: 137.13 LBS | DIASTOLIC BLOOD PRESSURE: 62 MMHG | SYSTOLIC BLOOD PRESSURE: 118 MMHG

## 2019-06-27 VITALS
DIASTOLIC BLOOD PRESSURE: 76 MMHG | BODY MASS INDEX: 22.84 KG/M2 | WEIGHT: 137.25 LBS | SYSTOLIC BLOOD PRESSURE: 108 MMHG

## 2019-06-27 DIAGNOSIS — Z36.89 ENCOUNTER FOR ULTRASOUND TO ASSESS FETAL GROWTH: ICD-10-CM

## 2019-06-27 DIAGNOSIS — Z86.718 HISTORY OF DVT (DEEP VEIN THROMBOSIS): ICD-10-CM

## 2019-06-27 DIAGNOSIS — Z36.82 ENCOUNTER FOR ANTENATAL SCREENING FOR NUCHAL TRANSLUCENCY: ICD-10-CM

## 2019-06-27 DIAGNOSIS — Z86.718 HISTORY OF MATERNAL DEEP VEIN THROMBOSIS (DVT): ICD-10-CM

## 2019-06-27 DIAGNOSIS — Z3A.12 12 WEEKS GESTATION OF PREGNANCY: ICD-10-CM

## 2019-06-27 DIAGNOSIS — Z86.718 HISTORY OF DVT (DEEP VEIN THROMBOSIS): Primary | ICD-10-CM

## 2019-06-27 DIAGNOSIS — Z3A.09 9 WEEKS GESTATION OF PREGNANCY: ICD-10-CM

## 2019-06-27 DIAGNOSIS — Z87.59 HISTORY OF MATERNAL DEEP VEIN THROMBOSIS (DVT): ICD-10-CM

## 2019-06-27 PROCEDURE — 36415 COLL VENOUS BLD VENIPUNCTURE: CPT

## 2019-06-27 PROCEDURE — 86146 BETA-2 GLYCOPROTEIN ANTIBODY: CPT | Mod: 59

## 2019-06-27 PROCEDURE — 76813 OB US NUCHAL MEAS 1 GEST: CPT | Mod: S$GLB,,, | Performed by: OBSTETRICS & GYNECOLOGY

## 2019-06-27 PROCEDURE — 99243 OFF/OP CNSLTJ NEW/EST LOW 30: CPT | Mod: S$GLB,,, | Performed by: OBSTETRICS & GYNECOLOGY

## 2019-06-27 PROCEDURE — 99999 PR PBB SHADOW E&M-EST. PATIENT-LVL III: ICD-10-PCS | Mod: PBBFAC,,, | Performed by: STUDENT IN AN ORGANIZED HEALTH CARE EDUCATION/TRAINING PROGRAM

## 2019-06-27 PROCEDURE — 99999 PR PBB SHADOW E&M-EST. PATIENT-LVL III: ICD-10-PCS | Mod: PBBFAC,,, | Performed by: OBSTETRICS & GYNECOLOGY

## 2019-06-27 PROCEDURE — 0502F SUBSEQUENT PRENATAL CARE: CPT | Mod: CPTII,S$GLB,, | Performed by: STUDENT IN AN ORGANIZED HEALTH CARE EDUCATION/TRAINING PROGRAM

## 2019-06-27 PROCEDURE — 99999 PR PBB SHADOW E&M-EST. PATIENT-LVL III: CPT | Mod: PBBFAC,,, | Performed by: OBSTETRICS & GYNECOLOGY

## 2019-06-27 PROCEDURE — 99999 PR PBB SHADOW E&M-EST. PATIENT-LVL III: CPT | Mod: PBBFAC,,, | Performed by: STUDENT IN AN ORGANIZED HEALTH CARE EDUCATION/TRAINING PROGRAM

## 2019-06-27 PROCEDURE — 99243 PR OFFICE CONSULTATION,LEVEL III: ICD-10-PCS | Mod: S$GLB,,, | Performed by: OBSTETRICS & GYNECOLOGY

## 2019-06-27 PROCEDURE — 76813 PR US, OB NUCHAL, TRANSABDOM/TRANSVAG, FIRST GESTATION: ICD-10-PCS | Mod: S$GLB,,, | Performed by: OBSTETRICS & GYNECOLOGY

## 2019-06-27 PROCEDURE — 0502F PR SUBSEQUENT PRENATAL CARE: ICD-10-PCS | Mod: CPTII,S$GLB,, | Performed by: STUDENT IN AN ORGANIZED HEALTH CARE EDUCATION/TRAINING PROGRAM

## 2019-06-27 PROCEDURE — 81508 FTL CGEN ABNOR TWO PROTEINS: CPT

## 2019-06-27 NOTE — PROCEDURES
Procedures   Obstetrical ultrasound completed today.  See report in imaging section of [facility].

## 2019-06-27 NOTE — LETTER
June 28, 2019      Tahmina Alarcon MD  [address]  [address]  [facility] [address]           [facility] [address]  [address] [facility] [address]  Phone: [phone]          Patient: Niesha Pierre   MR Number: [id number]   YOB: 1995   Date of Visit: 6/27/2019       Dear Dr. Tahmina Alarcon:    Thank you for referring Niesha Pierre to me for evaluation. Attached you will find relevant portions of my assessment and plan of care.    If you have questions, please do not hesitate to call me. I look forward to following Niesha Pierre along with you.    Sincerely,    Noemi Mccrary MD    Enclosure  CC:  No Recipients    If you would like to receive this communication electronically, please contact [email or web address] or [phone] to request more information on [facility] [employer] access.    For providers and/or their staff who would like to refer a patient to [facility], please contact us through our one-stop-shop provider referral line, [facility], at [phone].    If you feel you have received this communication in error or would no longer like to receive these types of communications, please e-mail [email or web address]

## 2019-06-27 NOTE — PROGRESS NOTES
Doing well.  Lovenox 40 daily.  Will collect B2 glycoprotein antibodies.  Nausea improved.  No bleeding or cramping.  RTC in 4 weeks.

## 2019-06-28 NOTE — PROGRESS NOTES
Ms. Pierre is a 24-year-old  who is at 12 weeks and 4 days gestation who presents for consultation only due to history of DVT current on Lovenox 40 mg subcutaneously daily.  She is scheduled for an ultrasound with Dr. Alarcon today for nuchal translucency. Patient denies current DVT/PE symptoms-reported past phantom DVT pain.    Past medical history is significant for being diagnosed with a popliteal DVT in 2019 she was placed on Eliquis at that time and was on this for approximately 2 and half months and then found out she was pregnant right before needing to stop this medication. We reviewed limited information with respect to Eliquis exposure in pregnancy.  She saw a primary obstetrician was converted to Lovenox 40 mg subcutaneously daily.  Around that time in April she had a repeat ultrasound which demonstrated resolution of the DVT.  She is also in the interim seen Hematology and has had negative anticardiolipin antibodies,. neative lupus anticoagulant and hexagonal testing, normal homocystine,normal protein C and S,  factor 5 Leiden was normal, prothrombin 48912Y gene mutation was normal, and antithrombin III activity was normal.  She did not have beta 2 glycoprotein antibodies.  She has seen Hematology for multiple visits and they appeared to agree with her 40 mg subcutaneously prophylaxis of Lovenox. She herself has not had any other thromboembolism nor is there any immediate family member with thromboembolism.  She does have a maternal uncle who has factor 5 leiden deficiency that had a DVT but she has tested negative for this.  She was off OCPs for an extended time before developing the DVT and had not smoked in a number of years prior to having the DVT and this was not provoked by travel etc. Patient had negative LE dopplers in 2019.  CBC showed normal hgb and plts.    Past surgical history:  None  Social history:  Denies alcohol or drug use or smoking during pregnancy.  She does  use alcohol outside of pregnancy.  Family history of birth defects:  None  Medications:  Vitamin B6, Phenergan, Zofran p.r.n., Unisom, Lovenox 40 mg subcutaneously daily  No known drug allergies she may be allergic to pears    2/22/19:Acute, nonocclusive DVT in the left popliteal vein.  --multiple attempts made to discuss findings with ordering physician (Jaquelin Morales) however, all listed provided contacts are incorrect.  Findings within discussed by myself in person with the patient with recommendations to contact ordering physician as soon as possible for recommendations, no later than next week--    No sonographic evidence of focal hemodynamically significant arterial stenosis throughout the bilateral lower extremities.    4/5/19:No evidence of deep venous thrombosis in the left lower extremity.    Interval resolution of previous popliteal thrombus.    6/19/19:No evidence of deep venous thrombosis in the left lower extremity.    Assessment and plan:  1.  Intrauterine pregnancy at 12 weeks and 4 days. Dating based on first trimester scan not agreeing with LMP.    2.  Planning on having NT screening with Dr. palacio today patient is aware of the limitations of this testing and availability of diagnostic testing will discuss this further with Dr. palacio.    3.  History of DVT:Patient aware that pregnancy is a prothrombotic state. Csection would further increase risk of clots. Recommend  patient be on Lovenox 40mg SQ daily dosing through pregnancy and 8 weeks postpartum. Risks of Lovenox were reviewed and patient was given  precautions. Anesthesia should be notified by primary ob that patient is on Lovenox. Patient may benefit from anesthesia consultation. Discussed that while heparin can be used later in  pregnancy to better facilitate receiving regional anesthesia and to decrease risk of bleeding, this would be twice daily injections and anesthesia here would restrict her ability to have an epidural at the  doses of heparin needed for true prophylaxis.  Many women will continue Lovenox until day before scheduled delivery and then hold  the medication and resume in appropriate timeline postpartum in accordance with ACOG (bulletin 196) and anesthesia recommendations.  Patient has not had any reaction to Lovenox. Discussed calcium and vitamin D supplementation. I would also recommend a baby aspirin 81mg daily if patient does not have any bleeding.  I discussed this case with my colleagues given proximity of DVT to pregnancy and given resolution and location we agreed on the prophylactic dosing of 40mg SQ daily.. Recommend primary ob complete thrombophilia workup by ordering beta 2 glycoprotein antibodies IgG and IgM. Reviewed this information with Dr. Alarcon via phone.    4. Patient is anxious about delivery and possibility of tearing or not receiving an epidural.    5. Recommend primary ob confirm vitamin D deficiency is being addressed and would recommend repeating CBC with diff at some point given increase in ANC on prior CBC. Also, there are notes in chart about patient going on a cruise. Recommend primary ob confirm no Zika exposure.    6. Plan a fetal anatomic survey at 19 to 20 weeks and a growth ultrasound at 32 weeks given Lovenox use.    45 minutes was spent in face to face time with greater than half of that time spent in counseling and coordination of care.

## 2019-07-02 LAB
# FETUSES US: NORMAL
AGE AT DELIVERY: 24
B-HCG MOM SERPL: NORMAL
B-HCG SERPL-ACNC: 70.4 IU/ML
B2 GLYCOPROT1 IGA SER QL: <9 SAU
B2 GLYCOPROT1 IGG SER QL: <9 SGU
B2 GLYCOPROT1 IGM SER QL: <9 SMU
FET CRL US.MEAS: 62.2 MM
FET NASAL BONE LENGTH US.MEAS: NORMAL MM
FET NUCHAL FOLD MOM THICKNESS US.MEAS: NORMAL
FET NUCHAL FOLD THICKNESS US.MEAS: 1.8 MM
FET TS 21 RISK FROM MAT AGE: NORMAL
GA (DAYS): 5 D
GA (WEEKS): 12 WK
IDDM PATIENT QL: NORMAL
INTEGRATED SCN PATIENT-IMP NAR: NORMAL
INTEGRATED SCN PATIENT-IMP: NEGATIVE
PAPP-A MOM SERPL: NORMAL
PAPP-A SERPL-MCNC: 367.9 NG/ML
SMOKING STATUS FTND: NO
TS 18 RISK FETUS: NORMAL
TS 21 RISK FETUS: NORMAL
US DATE: NORMAL

## 2019-07-24 ENCOUNTER — TELEPHONE (OUTPATIENT)
Dept: OBSTETRICS AND GYNECOLOGY | Facility: CLINIC | Age: 24
End: 2019-07-24

## 2019-07-24 NOTE — TELEPHONE ENCOUNTER
Ob patient 4 months pg-  Just went to restroom and has a brown spot on her panties,scared patient.Please call her back

## 2019-07-24 NOTE — TELEPHONE ENCOUNTER
"16 3/7 week OB pt had a little dot of light brown discharge in underwear.  Mild cramping yesterday after she picked up something heavy, states she struggled to pick it up.  Has not had intercourse and "always" strains with a BM.  Took Miralax and Sennok 2 weeks ago but not since.  Recommended a daily stool softener and increase PO hydration, reassurance given.  Bleeding precautions discussed.  She has an appt Friday.    "

## 2019-07-25 RX ORDER — ENOXAPARIN SODIUM 100 MG/ML
40 INJECTION SUBCUTANEOUS DAILY
Qty: 0.4 ML | Refills: 6 | Status: SHIPPED | OUTPATIENT
Start: 2019-07-25 | End: 2019-08-26 | Stop reason: SDUPTHER

## 2019-07-26 ENCOUNTER — ROUTINE PRENATAL (OUTPATIENT)
Dept: OBSTETRICS AND GYNECOLOGY | Facility: CLINIC | Age: 24
End: 2019-07-26
Attending: STUDENT IN AN ORGANIZED HEALTH CARE EDUCATION/TRAINING PROGRAM
Payer: COMMERCIAL

## 2019-07-26 VITALS
BODY MASS INDEX: 22.87 KG/M2 | WEIGHT: 137.44 LBS | DIASTOLIC BLOOD PRESSURE: 62 MMHG | SYSTOLIC BLOOD PRESSURE: 102 MMHG

## 2019-07-26 DIAGNOSIS — Z3A.16 16 WEEKS GESTATION OF PREGNANCY: Primary | ICD-10-CM

## 2019-07-26 PROCEDURE — 99999 PR PBB SHADOW E&M-EST. PATIENT-LVL II: ICD-10-PCS | Mod: PBBFAC,,, | Performed by: STUDENT IN AN ORGANIZED HEALTH CARE EDUCATION/TRAINING PROGRAM

## 2019-07-26 PROCEDURE — 99999 PR PBB SHADOW E&M-EST. PATIENT-LVL II: CPT | Mod: PBBFAC,,, | Performed by: STUDENT IN AN ORGANIZED HEALTH CARE EDUCATION/TRAINING PROGRAM

## 2019-07-26 PROCEDURE — 0502F PR SUBSEQUENT PRENATAL CARE: ICD-10-PCS | Mod: CPTII,S$GLB,, | Performed by: STUDENT IN AN ORGANIZED HEALTH CARE EDUCATION/TRAINING PROGRAM

## 2019-07-26 PROCEDURE — 0502F SUBSEQUENT PRENATAL CARE: CPT | Mod: CPTII,S$GLB,, | Performed by: STUDENT IN AN ORGANIZED HEALTH CARE EDUCATION/TRAINING PROGRAM

## 2019-07-26 NOTE — PROGRESS NOTES
Doing well.  Reports an episode of brown discharge the other day.  No other episodes.  Has now resolved.  SSE scant white vaginal discharge, no blood, cervix C/T/H.  Anatomy scheduled.  All questions answered.  RTC in 4 weeks or sooner if needed.

## 2019-08-16 ENCOUNTER — PROCEDURE VISIT (OUTPATIENT)
Dept: MATERNAL FETAL MEDICINE | Facility: CLINIC | Age: 24
End: 2019-08-16
Attending: STUDENT IN AN ORGANIZED HEALTH CARE EDUCATION/TRAINING PROGRAM
Payer: COMMERCIAL

## 2019-08-16 ENCOUNTER — LAB VISIT (OUTPATIENT)
Dept: LAB | Facility: OTHER | Age: 24
End: 2019-08-16
Attending: STUDENT IN AN ORGANIZED HEALTH CARE EDUCATION/TRAINING PROGRAM
Payer: COMMERCIAL

## 2019-08-16 VITALS — WEIGHT: 136.88 LBS | BODY MASS INDEX: 22.78 KG/M2

## 2019-08-16 DIAGNOSIS — Z36.89 ENCOUNTER FOR FETAL ANATOMIC SURVEY: ICD-10-CM

## 2019-08-16 DIAGNOSIS — O22.30 DVT (DEEP VEIN THROMBOSIS) IN PREGNANCY: ICD-10-CM

## 2019-08-16 DIAGNOSIS — Z36.89 ENCOUNTER FOR FETAL ANATOMIC SURVEY: Primary | ICD-10-CM

## 2019-08-16 PROCEDURE — 76811 OB US DETAILED SNGL FETUS: CPT | Mod: S$GLB,,, | Performed by: PEDIATRICS

## 2019-08-16 PROCEDURE — 36415 COLL VENOUS BLD VENIPUNCTURE: CPT

## 2019-08-16 PROCEDURE — 81511 FTL CGEN ABNOR FOUR ANAL: CPT

## 2019-08-16 PROCEDURE — 76811 PR US, OB FETAL EVAL & EXAM, TRANSABDOM,FIRST GESTATION: ICD-10-PCS | Mod: S$GLB,,, | Performed by: PEDIATRICS

## 2019-08-16 PROCEDURE — 99499 NO LOS: ICD-10-PCS | Mod: S$GLB,,, | Performed by: PEDIATRICS

## 2019-08-16 PROCEDURE — 99499 UNLISTED E&M SERVICE: CPT | Mod: S$GLB,,, | Performed by: PEDIATRICS

## 2019-08-19 LAB
# FETUSES US: NORMAL
AFP MOM SERPL: 1.07
AFP SERPL-MCNC: 66.7 NG/ML
AGE AT DELIVERY: 24
B-HCG MOM SERPL: 0.63
B-HCG SERPL-ACNC: 13.1 IU/ML
COLLECT DATE BLD: NORMAL
COLLECT DATE: NORMAL
FET NASAL BONE LENGTH US.MEAS: NORMAL MM
FET NUCHAL FOLD MOM THICKNESS US.MEAS: 1.34
FET NUCHAL FOLD THICKNESS US.MEAS: 1.8 MM
FET TS 21 RISK FROM MAT AGE: NORMAL
GA (DAYS): 5 D
GA (WEEKS): 12 WK
GA METHOD: NORMAL
GEST. AGE (DAYS) 2ND SAMPLE (SS2): 6
GEST. AGE (WKS) 2ND SAMPLE (SS2): 19
IDDM PATIENT QL: NORMAL
INHIBIN A MOM SERPL: 0.85
INHIBIN A SERPL-MCNC: 160.7 PG/ML
INTEGRATED SCN PATIENT-IMP: NEGATIVE
PAPP-A MOM SERPL: 0.36
PAPP-A SERPL-MCNC: 367.9 NG/ML
SEQUENTIAL SCREEN PART 2 INTERP: NORMAL
TS 18 RISK FETUS: NORMAL
TS 21 RISK FETUS: NORMAL
U ESTRIOL MOM SERPL: 1.07
U ESTRIOL SERPL-MCNC: 2.25 NG/ML

## 2019-08-23 ENCOUNTER — ROUTINE PRENATAL (OUTPATIENT)
Dept: OBSTETRICS AND GYNECOLOGY | Facility: CLINIC | Age: 24
End: 2019-08-23
Attending: STUDENT IN AN ORGANIZED HEALTH CARE EDUCATION/TRAINING PROGRAM
Payer: COMMERCIAL

## 2019-08-23 VITALS — SYSTOLIC BLOOD PRESSURE: 100 MMHG | BODY MASS INDEX: 22.97 KG/M2 | WEIGHT: 138 LBS | DIASTOLIC BLOOD PRESSURE: 70 MMHG

## 2019-08-23 DIAGNOSIS — Z3A.20 20 WEEKS GESTATION OF PREGNANCY: Primary | ICD-10-CM

## 2019-08-23 PROCEDURE — 99999 PR PBB SHADOW E&M-EST. PATIENT-LVL III: ICD-10-PCS | Mod: PBBFAC,,, | Performed by: STUDENT IN AN ORGANIZED HEALTH CARE EDUCATION/TRAINING PROGRAM

## 2019-08-23 PROCEDURE — 90686 IIV4 VACC NO PRSV 0.5 ML IM: CPT | Mod: S$GLB,,, | Performed by: STUDENT IN AN ORGANIZED HEALTH CARE EDUCATION/TRAINING PROGRAM

## 2019-08-23 PROCEDURE — 90686 FLU VACCINE (QUAD) GREATER THAN OR EQUAL TO 3YO PRESERVATIVE FREE IM: ICD-10-PCS | Mod: S$GLB,,, | Performed by: STUDENT IN AN ORGANIZED HEALTH CARE EDUCATION/TRAINING PROGRAM

## 2019-08-23 PROCEDURE — 99999 PR PBB SHADOW E&M-EST. PATIENT-LVL III: CPT | Mod: PBBFAC,,, | Performed by: STUDENT IN AN ORGANIZED HEALTH CARE EDUCATION/TRAINING PROGRAM

## 2019-08-23 PROCEDURE — 0502F PR SUBSEQUENT PRENATAL CARE: ICD-10-PCS | Mod: CPTII,S$GLB,, | Performed by: STUDENT IN AN ORGANIZED HEALTH CARE EDUCATION/TRAINING PROGRAM

## 2019-08-23 PROCEDURE — 90471 FLU VACCINE (QUAD) GREATER THAN OR EQUAL TO 3YO PRESERVATIVE FREE IM: ICD-10-PCS | Mod: S$GLB,,, | Performed by: STUDENT IN AN ORGANIZED HEALTH CARE EDUCATION/TRAINING PROGRAM

## 2019-08-23 PROCEDURE — 90471 IMMUNIZATION ADMIN: CPT | Mod: S$GLB,,, | Performed by: STUDENT IN AN ORGANIZED HEALTH CARE EDUCATION/TRAINING PROGRAM

## 2019-08-23 PROCEDURE — 0502F SUBSEQUENT PRENATAL CARE: CPT | Mod: CPTII,S$GLB,, | Performed by: STUDENT IN AN ORGANIZED HEALTH CARE EDUCATION/TRAINING PROGRAM

## 2019-08-26 RX ORDER — ENOXAPARIN SODIUM 100 MG/ML
40 INJECTION SUBCUTANEOUS DAILY
Qty: 0.4 ML | Refills: 6 | Status: SHIPPED | OUTPATIENT
Start: 2019-08-26 | End: 2019-10-02 | Stop reason: SDUPTHER

## 2019-09-16 ENCOUNTER — PATIENT OUTREACH (OUTPATIENT)
Dept: ADMINISTRATIVE | Facility: OTHER | Age: 24
End: 2019-09-16

## 2019-09-16 ENCOUNTER — TELEPHONE (OUTPATIENT)
Dept: OBSTETRICS AND GYNECOLOGY | Facility: CLINIC | Age: 24
End: 2019-09-16

## 2019-09-16 NOTE — TELEPHONE ENCOUNTER
Spoke with the patient. Wanted to confirm what the GTT consist of. Counseled the patient on glucose testing procedure. Patient voiced understanding.

## 2019-09-16 NOTE — TELEPHONE ENCOUNTER
Dr. Clark OB pt, has questions regarding her glucose test that is scheduled for this wed. 09/18. Please call pt and advise, thank you.

## 2019-09-18 ENCOUNTER — LAB VISIT (OUTPATIENT)
Dept: LAB | Facility: HOSPITAL | Age: 24
End: 2019-09-18
Attending: STUDENT IN AN ORGANIZED HEALTH CARE EDUCATION/TRAINING PROGRAM
Payer: COMMERCIAL

## 2019-09-18 ENCOUNTER — ROUTINE PRENATAL (OUTPATIENT)
Dept: OBSTETRICS AND GYNECOLOGY | Facility: CLINIC | Age: 24
End: 2019-09-18
Attending: STUDENT IN AN ORGANIZED HEALTH CARE EDUCATION/TRAINING PROGRAM
Payer: COMMERCIAL

## 2019-09-18 VITALS
WEIGHT: 142.63 LBS | SYSTOLIC BLOOD PRESSURE: 102 MMHG | BODY MASS INDEX: 23.74 KG/M2 | DIASTOLIC BLOOD PRESSURE: 74 MMHG

## 2019-09-18 DIAGNOSIS — Z3A.20 20 WEEKS GESTATION OF PREGNANCY: ICD-10-CM

## 2019-09-18 DIAGNOSIS — Z3A.24 24 WEEKS GESTATION OF PREGNANCY: Primary | ICD-10-CM

## 2019-09-18 LAB
BASOPHILS # BLD AUTO: 0.05 K/UL (ref 0–0.2)
BASOPHILS NFR BLD: 0.6 % (ref 0–1.9)
DIFFERENTIAL METHOD: ABNORMAL
EOSINOPHIL # BLD AUTO: 0.1 K/UL (ref 0–0.5)
EOSINOPHIL NFR BLD: 1.2 % (ref 0–8)
ERYTHROCYTE [DISTWIDTH] IN BLOOD BY AUTOMATED COUNT: 13.3 % (ref 11.5–14.5)
GLUCOSE SERPL-MCNC: 95 MG/DL (ref 70–140)
HCT VFR BLD AUTO: 35.9 % (ref 37–48.5)
HGB BLD-MCNC: 11.6 G/DL (ref 12–16)
IMM GRANULOCYTES # BLD AUTO: 0.05 K/UL (ref 0–0.04)
IMM GRANULOCYTES NFR BLD AUTO: 0.6 % (ref 0–0.5)
LYMPHOCYTES # BLD AUTO: 1.3 K/UL (ref 1–4.8)
LYMPHOCYTES NFR BLD: 15 % (ref 18–48)
MCH RBC QN AUTO: 30.4 PG (ref 27–31)
MCHC RBC AUTO-ENTMCNC: 32.3 G/DL (ref 32–36)
MCV RBC AUTO: 94 FL (ref 82–98)
MONOCYTES # BLD AUTO: 0.5 K/UL (ref 0.3–1)
MONOCYTES NFR BLD: 6.1 % (ref 4–15)
NEUTROPHILS # BLD AUTO: 6.4 K/UL (ref 1.8–7.7)
NEUTROPHILS NFR BLD: 76.5 % (ref 38–73)
NRBC BLD-RTO: 0 /100 WBC
PLATELET # BLD AUTO: 215 K/UL (ref 150–350)
PMV BLD AUTO: 10.2 FL (ref 9.2–12.9)
RBC # BLD AUTO: 3.82 M/UL (ref 4–5.4)
WBC # BLD AUTO: 8.38 K/UL (ref 3.9–12.7)

## 2019-09-18 PROCEDURE — 99999 PR PBB SHADOW E&M-EST. PATIENT-LVL III: ICD-10-PCS | Mod: PBBFAC,,, | Performed by: STUDENT IN AN ORGANIZED HEALTH CARE EDUCATION/TRAINING PROGRAM

## 2019-09-18 PROCEDURE — 82950 GLUCOSE TEST: CPT

## 2019-09-18 PROCEDURE — 0502F SUBSEQUENT PRENATAL CARE: CPT | Mod: CPTII,S$GLB,, | Performed by: STUDENT IN AN ORGANIZED HEALTH CARE EDUCATION/TRAINING PROGRAM

## 2019-09-18 PROCEDURE — 99999 PR PBB SHADOW E&M-EST. PATIENT-LVL III: CPT | Mod: PBBFAC,,, | Performed by: STUDENT IN AN ORGANIZED HEALTH CARE EDUCATION/TRAINING PROGRAM

## 2019-09-18 PROCEDURE — 85025 COMPLETE CBC W/AUTO DIFF WBC: CPT

## 2019-09-18 PROCEDURE — 0502F PR SUBSEQUENT PRENATAL CARE: ICD-10-PCS | Mod: CPTII,S$GLB,, | Performed by: STUDENT IN AN ORGANIZED HEALTH CARE EDUCATION/TRAINING PROGRAM

## 2019-09-18 RX ORDER — AZELAIC ACID 0.15 G/G
GEL TOPICAL
Refills: 3 | COMMUNITY
Start: 2019-09-05 | End: 2020-11-17

## 2019-09-18 NOTE — PROGRESS NOTES
Doing well.  No complaints.  Reports fetal movement.  Denies contractions, leakage of fluid, vaginal bleeding.  Glucose and CBC today.  All questions answered.  RTC in 4 weeks or sooner if needed.

## 2019-09-27 ENCOUNTER — PATIENT MESSAGE (OUTPATIENT)
Dept: OBSTETRICS AND GYNECOLOGY | Facility: CLINIC | Age: 24
End: 2019-09-27

## 2019-10-02 RX ORDER — ENOXAPARIN SODIUM 100 MG/ML
40 INJECTION SUBCUTANEOUS DAILY
Qty: 0.4 ML | Refills: 6 | Status: SHIPPED | OUTPATIENT
Start: 2019-10-02 | End: 2019-10-31 | Stop reason: SDUPTHER

## 2019-10-02 RX ORDER — ONDANSETRON 4 MG/1
4 TABLET, FILM COATED ORAL EVERY 6 HOURS PRN
Qty: 20 TABLET | Refills: 0 | Status: SHIPPED | OUTPATIENT
Start: 2019-10-02 | End: 2020-10-09

## 2019-10-07 ENCOUNTER — TELEPHONE (OUTPATIENT)
Dept: OBSTETRICS AND GYNECOLOGY | Facility: CLINIC | Age: 24
End: 2019-10-07

## 2019-10-07 ENCOUNTER — PATIENT OUTREACH (OUTPATIENT)
Dept: ADMINISTRATIVE | Facility: OTHER | Age: 24
End: 2019-10-07

## 2019-10-07 ENCOUNTER — ROUTINE PRENATAL (OUTPATIENT)
Dept: OBSTETRICS AND GYNECOLOGY | Facility: CLINIC | Age: 24
End: 2019-10-07
Attending: STUDENT IN AN ORGANIZED HEALTH CARE EDUCATION/TRAINING PROGRAM
Payer: COMMERCIAL

## 2019-10-07 VITALS
SYSTOLIC BLOOD PRESSURE: 114 MMHG | DIASTOLIC BLOOD PRESSURE: 76 MMHG | BODY MASS INDEX: 23.97 KG/M2 | WEIGHT: 144.06 LBS

## 2019-10-07 DIAGNOSIS — R10.2 PELVIC PAIN IN FEMALE: Primary | ICD-10-CM

## 2019-10-07 PROCEDURE — 0502F PR SUBSEQUENT PRENATAL CARE: ICD-10-PCS | Mod: CPTII,S$GLB,, | Performed by: STUDENT IN AN ORGANIZED HEALTH CARE EDUCATION/TRAINING PROGRAM

## 2019-10-07 PROCEDURE — 99999 PR PBB SHADOW E&M-EST. PATIENT-LVL III: ICD-10-PCS | Mod: PBBFAC,,, | Performed by: STUDENT IN AN ORGANIZED HEALTH CARE EDUCATION/TRAINING PROGRAM

## 2019-10-07 PROCEDURE — 0502F SUBSEQUENT PRENATAL CARE: CPT | Mod: CPTII,S$GLB,, | Performed by: STUDENT IN AN ORGANIZED HEALTH CARE EDUCATION/TRAINING PROGRAM

## 2019-10-07 PROCEDURE — 87086 URINE CULTURE/COLONY COUNT: CPT

## 2019-10-07 PROCEDURE — 99999 PR PBB SHADOW E&M-EST. PATIENT-LVL III: CPT | Mod: PBBFAC,,, | Performed by: STUDENT IN AN ORGANIZED HEALTH CARE EDUCATION/TRAINING PROGRAM

## 2019-10-07 NOTE — PROGRESS NOTES
Reports some GI upset over the past few weeks.  Thinks it is related to eating pizza.  Notices some nausea in PM.  Then has also had several episodes of diarrhea.  It has not occurred daily.  She is tolerating regular diet.  No cramping or bleeding.  Reports fetal movement.  U/S today with SLIUP in vertex presentation, gross fetal movement and cardiac activity present.  Fluid normal.  Placenta anterior without signs of retroplacental clot or hemorrhage.  SSE with scant physiologic discharge.  Cervix C/T/H.  Discussed dietary modifications and GERD.  She will also monitor symptoms to ID triggers.  All questions answered.  Keep scheduled follow up in 1 week.

## 2019-10-07 NOTE — TELEPHONE ENCOUNTER
Dr. Clark OB pt, says since 09/27 she's been having the same symptoms of extreme nausea and soreness around her belly button area. Pt is very concerned because symptoms are not going away and especially now that she's experiencing the soreness around her stomach area. Please call pt and advise, thank you.

## 2019-10-07 NOTE — TELEPHONE ENCOUNTER
27 1/7 week OB reports nausea most nights for over a week. She gets nauseated then has to have a BM but it doesn't go away.  She is still nauseated today.  Denies vomiting.  Zofran provides some relief.  Last night nausea started when she was laying flat on her back, she took Zofran and propped herself up and got some relief.  Could this be reflux?      She also reports pain around umbilicus.  The pain subsides when laying on side with a blanket under supporting abdomen but will come back when she gets up and starts walking again.  Reassured her it sounded like normal growing pains and that it was reassuring that is subsided with repositioning.     She wrote on the portal on 9/27 c/o nausea.

## 2019-10-09 LAB — BACTERIA UR CULT: NO GROWTH

## 2019-10-15 ENCOUNTER — PATIENT OUTREACH (OUTPATIENT)
Dept: ADMINISTRATIVE | Facility: OTHER | Age: 24
End: 2019-10-15

## 2019-10-18 ENCOUNTER — ROUTINE PRENATAL (OUTPATIENT)
Dept: OBSTETRICS AND GYNECOLOGY | Facility: CLINIC | Age: 24
End: 2019-10-18
Attending: STUDENT IN AN ORGANIZED HEALTH CARE EDUCATION/TRAINING PROGRAM
Payer: COMMERCIAL

## 2019-10-18 VITALS — SYSTOLIC BLOOD PRESSURE: 110 MMHG | WEIGHT: 145.5 LBS | DIASTOLIC BLOOD PRESSURE: 64 MMHG | BODY MASS INDEX: 24.21 KG/M2

## 2019-10-18 DIAGNOSIS — R82.90 ABNORMAL URINE FINDINGS: Primary | ICD-10-CM

## 2019-10-18 DIAGNOSIS — Z86.718 HISTORY OF DVT (DEEP VEIN THROMBOSIS): ICD-10-CM

## 2019-10-18 DIAGNOSIS — Z3A.28 28 WEEKS GESTATION OF PREGNANCY: ICD-10-CM

## 2019-10-18 PROCEDURE — 87086 URINE CULTURE/COLONY COUNT: CPT

## 2019-10-18 PROCEDURE — 99999 PR PBB SHADOW E&M-EST. PATIENT-LVL III: CPT | Mod: PBBFAC,,, | Performed by: STUDENT IN AN ORGANIZED HEALTH CARE EDUCATION/TRAINING PROGRAM

## 2019-10-18 PROCEDURE — 99999 PR PBB SHADOW E&M-EST. PATIENT-LVL III: ICD-10-PCS | Mod: PBBFAC,,, | Performed by: STUDENT IN AN ORGANIZED HEALTH CARE EDUCATION/TRAINING PROGRAM

## 2019-10-18 PROCEDURE — 0502F PR SUBSEQUENT PRENATAL CARE: ICD-10-PCS | Mod: CPTII,S$GLB,, | Performed by: STUDENT IN AN ORGANIZED HEALTH CARE EDUCATION/TRAINING PROGRAM

## 2019-10-18 PROCEDURE — 0502F SUBSEQUENT PRENATAL CARE: CPT | Mod: CPTII,S$GLB,, | Performed by: STUDENT IN AN ORGANIZED HEALTH CARE EDUCATION/TRAINING PROGRAM

## 2019-10-18 NOTE — PROGRESS NOTES
Doing well.  No complaints.  Reports fetal movement.  Denies contractions, leakage of fluid, vaginal bleeding.  All questions answered.  OB anesthesia consult.  RTC in 2 weeks or sooner if needed.

## 2019-10-19 LAB — BACTERIA UR CULT: NORMAL

## 2019-10-28 ENCOUNTER — PATIENT OUTREACH (OUTPATIENT)
Dept: ADMINISTRATIVE | Facility: OTHER | Age: 24
End: 2019-10-28

## 2019-10-30 ENCOUNTER — CLINICAL SUPPORT (OUTPATIENT)
Dept: OBSTETRICS AND GYNECOLOGY | Facility: CLINIC | Age: 24
End: 2019-10-30
Payer: COMMERCIAL

## 2019-10-30 ENCOUNTER — ROUTINE PRENATAL (OUTPATIENT)
Dept: OBSTETRICS AND GYNECOLOGY | Facility: CLINIC | Age: 24
End: 2019-10-30
Attending: STUDENT IN AN ORGANIZED HEALTH CARE EDUCATION/TRAINING PROGRAM
Payer: COMMERCIAL

## 2019-10-30 VITALS — BODY MASS INDEX: 24.54 KG/M2 | WEIGHT: 147.5 LBS | DIASTOLIC BLOOD PRESSURE: 72 MMHG | SYSTOLIC BLOOD PRESSURE: 104 MMHG

## 2019-10-30 DIAGNOSIS — Z23 NEED FOR TDAP VACCINATION: ICD-10-CM

## 2019-10-30 DIAGNOSIS — Z3A.30 30 WEEKS GESTATION OF PREGNANCY: Primary | ICD-10-CM

## 2019-10-30 DIAGNOSIS — O26.843 UTERINE SIZE-DATE DISCREPANCY IN THIRD TRIMESTER: ICD-10-CM

## 2019-10-30 DIAGNOSIS — Z23 NEED FOR TDAP VACCINATION: Primary | ICD-10-CM

## 2019-10-30 DIAGNOSIS — Z34.03 ENCOUNTER FOR SUPERVISION OF NORMAL FIRST PREGNANCY, THIRD TRIMESTER: ICD-10-CM

## 2019-10-30 PROCEDURE — 99999 PR PBB SHADOW E&M-EST. PATIENT-LVL III: ICD-10-PCS | Mod: PBBFAC,,, | Performed by: NURSE PRACTITIONER

## 2019-10-30 PROCEDURE — 90715 TDAP VACCINE GREATER THAN OR EQUAL TO 7YO IM: ICD-10-PCS | Mod: S$GLB,,, | Performed by: NURSE PRACTITIONER

## 2019-10-30 PROCEDURE — 99999 PR PBB SHADOW E&M-EST. PATIENT-LVL I: ICD-10-PCS | Mod: PBBFAC,,,

## 2019-10-30 PROCEDURE — 0502F PR SUBSEQUENT PRENATAL CARE: ICD-10-PCS | Mod: CPTII,S$GLB,, | Performed by: NURSE PRACTITIONER

## 2019-10-30 PROCEDURE — 90471 IMMUNIZATION ADMIN: CPT | Mod: S$GLB,,, | Performed by: NURSE PRACTITIONER

## 2019-10-30 PROCEDURE — 0502F SUBSEQUENT PRENATAL CARE: CPT | Mod: CPTII,S$GLB,, | Performed by: NURSE PRACTITIONER

## 2019-10-30 PROCEDURE — 90715 TDAP VACCINE 7 YRS/> IM: CPT | Mod: S$GLB,,, | Performed by: NURSE PRACTITIONER

## 2019-10-30 PROCEDURE — 90471 TDAP VACCINE GREATER THAN OR EQUAL TO 7YO IM: ICD-10-PCS | Mod: S$GLB,,, | Performed by: NURSE PRACTITIONER

## 2019-10-30 PROCEDURE — 99999 PR PBB SHADOW E&M-EST. PATIENT-LVL I: CPT | Mod: PBBFAC,,,

## 2019-10-30 PROCEDURE — 99999 PR PBB SHADOW E&M-EST. PATIENT-LVL III: CPT | Mod: PBBFAC,,, | Performed by: NURSE PRACTITIONER

## 2019-10-30 NOTE — PROGRESS NOTES
Ordering Provider: Charo Coon NP    During visit today patient received an injection of tdap to RIGHt deltoid.  Tolerated well.  Instructed pt to remain 15 minutes after injection to monitor for reactions.      Pre pain scale: none    Post pain scale: none

## 2019-10-30 NOTE — PROGRESS NOTES
Here for routine OB appt with no complaints.  Reports good FM.    Denies LOF, denies VB, denies contractions.  Reviewed warning signs of Labor, Bleeding, & Preeclampsia.    Daily FM counts reinforced - decreased FM prec given.  F/U scheduled 2 weeks.  U/S for growth at next visit.

## 2019-10-31 RX ORDER — ENOXAPARIN SODIUM 100 MG/ML
40 INJECTION SUBCUTANEOUS DAILY
Qty: 0.4 ML | Refills: 6 | Status: SHIPPED | OUTPATIENT
Start: 2019-10-31 | End: 2019-12-02 | Stop reason: SDUPTHER

## 2019-11-14 ENCOUNTER — PROCEDURE VISIT (OUTPATIENT)
Dept: OBSTETRICS AND GYNECOLOGY | Facility: CLINIC | Age: 24
End: 2019-11-14
Payer: COMMERCIAL

## 2019-11-14 ENCOUNTER — ROUTINE PRENATAL (OUTPATIENT)
Dept: OBSTETRICS AND GYNECOLOGY | Facility: CLINIC | Age: 24
End: 2019-11-14
Attending: STUDENT IN AN ORGANIZED HEALTH CARE EDUCATION/TRAINING PROGRAM
Payer: COMMERCIAL

## 2019-11-14 VITALS
WEIGHT: 147.69 LBS | DIASTOLIC BLOOD PRESSURE: 68 MMHG | SYSTOLIC BLOOD PRESSURE: 110 MMHG | BODY MASS INDEX: 24.58 KG/M2

## 2019-11-14 DIAGNOSIS — Z3A.32 32 WEEKS GESTATION OF PREGNANCY: Primary | ICD-10-CM

## 2019-11-14 DIAGNOSIS — O26.843 UTERINE SIZE-DATE DISCREPANCY IN THIRD TRIMESTER: ICD-10-CM

## 2019-11-14 PROCEDURE — 0502F PR SUBSEQUENT PRENATAL CARE: ICD-10-PCS | Mod: CPTII,S$GLB,, | Performed by: STUDENT IN AN ORGANIZED HEALTH CARE EDUCATION/TRAINING PROGRAM

## 2019-11-14 PROCEDURE — 76816 PR  US,PREGNANT UTERUS,F/U,TRANSABD APP: ICD-10-PCS | Mod: S$GLB,,, | Performed by: OBSTETRICS & GYNECOLOGY

## 2019-11-14 PROCEDURE — 99999 PR PBB SHADOW E&M-EST. PATIENT-LVL III: ICD-10-PCS | Mod: PBBFAC,,, | Performed by: STUDENT IN AN ORGANIZED HEALTH CARE EDUCATION/TRAINING PROGRAM

## 2019-11-14 PROCEDURE — 76816 OB US FOLLOW-UP PER FETUS: CPT | Mod: S$GLB,,, | Performed by: OBSTETRICS & GYNECOLOGY

## 2019-11-14 PROCEDURE — 99999 PR PBB SHADOW E&M-EST. PATIENT-LVL III: CPT | Mod: PBBFAC,,, | Performed by: STUDENT IN AN ORGANIZED HEALTH CARE EDUCATION/TRAINING PROGRAM

## 2019-11-14 PROCEDURE — 0502F SUBSEQUENT PRENATAL CARE: CPT | Mod: CPTII,S$GLB,, | Performed by: STUDENT IN AN ORGANIZED HEALTH CARE EDUCATION/TRAINING PROGRAM

## 2019-11-14 NOTE — PROGRESS NOTES
Doing well.  No complaints.  Reports fetal movement.  Denies contractions, leakage of fluid, vaginal bleeding.  Needs visit with anesthesia.  All questions answered.  RTC in 2 weeks or sooner if needed.

## 2019-11-25 ENCOUNTER — ROUTINE PRENATAL (OUTPATIENT)
Dept: OBSTETRICS AND GYNECOLOGY | Facility: CLINIC | Age: 24
End: 2019-11-25
Attending: STUDENT IN AN ORGANIZED HEALTH CARE EDUCATION/TRAINING PROGRAM
Payer: COMMERCIAL

## 2019-11-25 VITALS
DIASTOLIC BLOOD PRESSURE: 66 MMHG | WEIGHT: 153.13 LBS | SYSTOLIC BLOOD PRESSURE: 106 MMHG | BODY MASS INDEX: 25.48 KG/M2

## 2019-11-25 DIAGNOSIS — Z3A.34 34 WEEKS GESTATION OF PREGNANCY: Primary | ICD-10-CM

## 2019-11-25 PROCEDURE — 99999 PR PBB SHADOW E&M-EST. PATIENT-LVL III: ICD-10-PCS | Mod: PBBFAC,,, | Performed by: STUDENT IN AN ORGANIZED HEALTH CARE EDUCATION/TRAINING PROGRAM

## 2019-11-25 PROCEDURE — 0502F PR SUBSEQUENT PRENATAL CARE: ICD-10-PCS | Mod: CPTII,S$GLB,, | Performed by: STUDENT IN AN ORGANIZED HEALTH CARE EDUCATION/TRAINING PROGRAM

## 2019-11-25 PROCEDURE — 0502F SUBSEQUENT PRENATAL CARE: CPT | Mod: CPTII,S$GLB,, | Performed by: STUDENT IN AN ORGANIZED HEALTH CARE EDUCATION/TRAINING PROGRAM

## 2019-11-25 PROCEDURE — 99999 PR PBB SHADOW E&M-EST. PATIENT-LVL III: CPT | Mod: PBBFAC,,, | Performed by: STUDENT IN AN ORGANIZED HEALTH CARE EDUCATION/TRAINING PROGRAM

## 2019-12-02 RX ORDER — ENOXAPARIN SODIUM 100 MG/ML
40 INJECTION SUBCUTANEOUS DAILY
Qty: 0.4 ML | Refills: 6 | Status: ON HOLD | OUTPATIENT
Start: 2019-12-02 | End: 2019-12-31 | Stop reason: HOSPADM

## 2019-12-06 ENCOUNTER — ROUTINE PRENATAL (OUTPATIENT)
Dept: OBSTETRICS AND GYNECOLOGY | Facility: CLINIC | Age: 24
End: 2019-12-06
Attending: STUDENT IN AN ORGANIZED HEALTH CARE EDUCATION/TRAINING PROGRAM
Payer: COMMERCIAL

## 2019-12-06 ENCOUNTER — LAB VISIT (OUTPATIENT)
Dept: LAB | Facility: HOSPITAL | Age: 24
End: 2019-12-06
Attending: STUDENT IN AN ORGANIZED HEALTH CARE EDUCATION/TRAINING PROGRAM
Payer: COMMERCIAL

## 2019-12-06 VITALS
BODY MASS INDEX: 25.31 KG/M2 | SYSTOLIC BLOOD PRESSURE: 110 MMHG | WEIGHT: 152.13 LBS | DIASTOLIC BLOOD PRESSURE: 72 MMHG

## 2019-12-06 DIAGNOSIS — Z3A.35 35 WEEKS GESTATION OF PREGNANCY: ICD-10-CM

## 2019-12-06 DIAGNOSIS — O22.30 DVT (DEEP VEIN THROMBOSIS) IN PREGNANCY: ICD-10-CM

## 2019-12-06 DIAGNOSIS — Z3A.35 35 WEEKS GESTATION OF PREGNANCY: Primary | ICD-10-CM

## 2019-12-06 DIAGNOSIS — R82.998 LEUKOCYTES IN URINE: ICD-10-CM

## 2019-12-06 LAB
BASOPHILS # BLD AUTO: 0.05 K/UL (ref 0–0.2)
BASOPHILS NFR BLD: 0.5 % (ref 0–1.9)
DIFFERENTIAL METHOD: ABNORMAL
EOSINOPHIL # BLD AUTO: 0.1 K/UL (ref 0–0.5)
EOSINOPHIL NFR BLD: 1.1 % (ref 0–8)
ERYTHROCYTE [DISTWIDTH] IN BLOOD BY AUTOMATED COUNT: 12.6 % (ref 11.5–14.5)
HCT VFR BLD AUTO: 37.9 % (ref 37–48.5)
HGB BLD-MCNC: 12.1 G/DL (ref 12–16)
IMM GRANULOCYTES # BLD AUTO: 0.07 K/UL (ref 0–0.04)
IMM GRANULOCYTES NFR BLD AUTO: 0.7 % (ref 0–0.5)
LYMPHOCYTES # BLD AUTO: 1.2 K/UL (ref 1–4.8)
LYMPHOCYTES NFR BLD: 10.9 % (ref 18–48)
MCH RBC QN AUTO: 28.9 PG (ref 27–31)
MCHC RBC AUTO-ENTMCNC: 31.9 G/DL (ref 32–36)
MCV RBC AUTO: 91 FL (ref 82–98)
MONOCYTES # BLD AUTO: 0.9 K/UL (ref 0.3–1)
MONOCYTES NFR BLD: 8.1 % (ref 4–15)
NEUTROPHILS # BLD AUTO: 8.4 K/UL (ref 1.8–7.7)
NEUTROPHILS NFR BLD: 78.7 % (ref 38–73)
NRBC BLD-RTO: 0 /100 WBC
PLATELET # BLD AUTO: 216 K/UL (ref 150–350)
PMV BLD AUTO: 11.1 FL (ref 9.2–12.9)
RBC # BLD AUTO: 4.19 M/UL (ref 4–5.4)
WBC # BLD AUTO: 10.63 K/UL (ref 3.9–12.7)

## 2019-12-06 PROCEDURE — 99999 PR PBB SHADOW E&M-EST. PATIENT-LVL III: ICD-10-PCS | Mod: PBBFAC,,, | Performed by: STUDENT IN AN ORGANIZED HEALTH CARE EDUCATION/TRAINING PROGRAM

## 2019-12-06 PROCEDURE — 85025 COMPLETE CBC W/AUTO DIFF WBC: CPT

## 2019-12-06 PROCEDURE — 87086 URINE CULTURE/COLONY COUNT: CPT

## 2019-12-06 PROCEDURE — 0502F PR SUBSEQUENT PRENATAL CARE: ICD-10-PCS | Mod: CPTII,S$GLB,, | Performed by: STUDENT IN AN ORGANIZED HEALTH CARE EDUCATION/TRAINING PROGRAM

## 2019-12-06 PROCEDURE — 0502F SUBSEQUENT PRENATAL CARE: CPT | Mod: CPTII,S$GLB,, | Performed by: STUDENT IN AN ORGANIZED HEALTH CARE EDUCATION/TRAINING PROGRAM

## 2019-12-06 PROCEDURE — 86592 SYPHILIS TEST NON-TREP QUAL: CPT

## 2019-12-06 PROCEDURE — 87081 CULTURE SCREEN ONLY: CPT | Mod: 59

## 2019-12-06 PROCEDURE — 86703 HIV-1/HIV-2 1 RESULT ANTBDY: CPT

## 2019-12-06 PROCEDURE — 99999 PR PBB SHADOW E&M-EST. PATIENT-LVL III: CPT | Mod: PBBFAC,,, | Performed by: STUDENT IN AN ORGANIZED HEALTH CARE EDUCATION/TRAINING PROGRAM

## 2019-12-06 NOTE — PROGRESS NOTES
Doing well.  No complaints.  Reports fetal movement.  Denies contractions, leakage of fluid, vaginal bleeding.  SVE 1/T/H.  GBS collected.  Consents signed.  Needs OB anesthesia.  All questions answered.  RTC in 1 week or sooner if needed.

## 2019-12-07 LAB — RPR SER QL: NORMAL

## 2019-12-08 LAB — BACTERIA UR CULT: NORMAL

## 2019-12-09 LAB
BACTERIA SPEC AEROBE CULT: NORMAL
HIV 1+2 AB+HIV1 P24 AG SERPL QL IA: NEGATIVE

## 2019-12-10 ENCOUNTER — TELEPHONE (OUTPATIENT)
Dept: OBSTETRICS AND GYNECOLOGY | Facility: CLINIC | Age: 24
End: 2019-12-10

## 2019-12-10 NOTE — TELEPHONE ENCOUNTER
----- Message from Tahmina Alarcon MD sent at 12/6/2019  4:24 PM CST -----  Please set up for OB anesthesia.  Thanks!!

## 2019-12-13 ENCOUNTER — ROUTINE PRENATAL (OUTPATIENT)
Dept: OBSTETRICS AND GYNECOLOGY | Facility: CLINIC | Age: 24
End: 2019-12-13
Attending: STUDENT IN AN ORGANIZED HEALTH CARE EDUCATION/TRAINING PROGRAM
Payer: COMMERCIAL

## 2019-12-13 VITALS
DIASTOLIC BLOOD PRESSURE: 66 MMHG | WEIGHT: 154.31 LBS | BODY MASS INDEX: 25.68 KG/M2 | SYSTOLIC BLOOD PRESSURE: 118 MMHG

## 2019-12-13 DIAGNOSIS — Z3A.36 36 WEEKS GESTATION OF PREGNANCY: Primary | ICD-10-CM

## 2019-12-13 PROCEDURE — 0502F SUBSEQUENT PRENATAL CARE: CPT | Mod: CPTII,S$GLB,, | Performed by: STUDENT IN AN ORGANIZED HEALTH CARE EDUCATION/TRAINING PROGRAM

## 2019-12-13 PROCEDURE — 99999 PR PBB SHADOW E&M-EST. PATIENT-LVL III: ICD-10-PCS | Mod: PBBFAC,,, | Performed by: STUDENT IN AN ORGANIZED HEALTH CARE EDUCATION/TRAINING PROGRAM

## 2019-12-13 PROCEDURE — 99999 PR PBB SHADOW E&M-EST. PATIENT-LVL III: CPT | Mod: PBBFAC,,, | Performed by: STUDENT IN AN ORGANIZED HEALTH CARE EDUCATION/TRAINING PROGRAM

## 2019-12-13 PROCEDURE — 0502F PR SUBSEQUENT PRENATAL CARE: ICD-10-PCS | Mod: CPTII,S$GLB,, | Performed by: STUDENT IN AN ORGANIZED HEALTH CARE EDUCATION/TRAINING PROGRAM

## 2019-12-16 ENCOUNTER — TELEPHONE (OUTPATIENT)
Dept: OBSTETRICS AND GYNECOLOGY | Facility: CLINIC | Age: 24
End: 2019-12-16

## 2019-12-16 DIAGNOSIS — O22.30 DVT (DEEP VEIN THROMBOSIS) IN PREGNANCY: Primary | ICD-10-CM

## 2019-12-16 NOTE — TELEPHONE ENCOUNTER
Pt is currently 37 weeks 1 day.  Do you want 12/20 or 1/20 because you say she will be 39 weeks but she will not be 39 weeks by 12/20 (this Friday)

## 2019-12-16 NOTE — TELEPHONE ENCOUNTER
----- Message from Tahmina Alarcon MD sent at 2019 11:45 AM CST -----  Please schedule patient for IOL  On 19 at 0400.  She is  and will be 39 weeks.  She is /3.  Has DVT on lovenox. Thanks!!

## 2019-12-20 ENCOUNTER — ROUTINE PRENATAL (OUTPATIENT)
Dept: OBSTETRICS AND GYNECOLOGY | Facility: CLINIC | Age: 24
End: 2019-12-20
Attending: STUDENT IN AN ORGANIZED HEALTH CARE EDUCATION/TRAINING PROGRAM
Payer: COMMERCIAL

## 2019-12-20 VITALS — BODY MASS INDEX: 26.05 KG/M2 | WEIGHT: 156.5 LBS | DIASTOLIC BLOOD PRESSURE: 70 MMHG | SYSTOLIC BLOOD PRESSURE: 100 MMHG

## 2019-12-20 DIAGNOSIS — Z3A.37 37 WEEKS GESTATION OF PREGNANCY: Primary | ICD-10-CM

## 2019-12-20 PROCEDURE — 0502F SUBSEQUENT PRENATAL CARE: CPT | Mod: CPTII,S$GLB,, | Performed by: STUDENT IN AN ORGANIZED HEALTH CARE EDUCATION/TRAINING PROGRAM

## 2019-12-20 PROCEDURE — 0502F PR SUBSEQUENT PRENATAL CARE: ICD-10-PCS | Mod: CPTII,S$GLB,, | Performed by: STUDENT IN AN ORGANIZED HEALTH CARE EDUCATION/TRAINING PROGRAM

## 2019-12-20 PROCEDURE — 99999 PR PBB SHADOW E&M-EST. PATIENT-LVL III: ICD-10-PCS | Mod: PBBFAC,,, | Performed by: STUDENT IN AN ORGANIZED HEALTH CARE EDUCATION/TRAINING PROGRAM

## 2019-12-20 PROCEDURE — 99999 PR PBB SHADOW E&M-EST. PATIENT-LVL III: CPT | Mod: PBBFAC,,, | Performed by: STUDENT IN AN ORGANIZED HEALTH CARE EDUCATION/TRAINING PROGRAM

## 2019-12-20 NOTE — PROGRESS NOTES
Doing well.  No complaints.  Reports fetal movement.  Denies contractions, leakage of fluid, vaginal bleeding.  SVE 1/50/-3.  All questions answered.  RTC in 1 week or sooner if needed.

## 2019-12-27 ENCOUNTER — ROUTINE PRENATAL (OUTPATIENT)
Dept: OBSTETRICS AND GYNECOLOGY | Facility: CLINIC | Age: 24
End: 2019-12-27
Attending: STUDENT IN AN ORGANIZED HEALTH CARE EDUCATION/TRAINING PROGRAM
Payer: COMMERCIAL

## 2019-12-27 VITALS
SYSTOLIC BLOOD PRESSURE: 100 MMHG | DIASTOLIC BLOOD PRESSURE: 70 MMHG | BODY MASS INDEX: 25.61 KG/M2 | WEIGHT: 153.88 LBS

## 2019-12-27 DIAGNOSIS — Z3A.38 38 WEEKS GESTATION OF PREGNANCY: Primary | ICD-10-CM

## 2019-12-27 PROCEDURE — 99999 PR PBB SHADOW E&M-EST. PATIENT-LVL III: CPT | Mod: PBBFAC,,, | Performed by: STUDENT IN AN ORGANIZED HEALTH CARE EDUCATION/TRAINING PROGRAM

## 2019-12-27 PROCEDURE — 99999 PR PBB SHADOW E&M-EST. PATIENT-LVL III: ICD-10-PCS | Mod: PBBFAC,,, | Performed by: STUDENT IN AN ORGANIZED HEALTH CARE EDUCATION/TRAINING PROGRAM

## 2019-12-27 PROCEDURE — 0502F SUBSEQUENT PRENATAL CARE: CPT | Mod: CPTII,S$GLB,, | Performed by: STUDENT IN AN ORGANIZED HEALTH CARE EDUCATION/TRAINING PROGRAM

## 2019-12-27 PROCEDURE — 0502F PR SUBSEQUENT PRENATAL CARE: ICD-10-PCS | Mod: CPTII,S$GLB,, | Performed by: STUDENT IN AN ORGANIZED HEALTH CARE EDUCATION/TRAINING PROGRAM

## 2019-12-27 NOTE — PROGRESS NOTES
Doing well.  No complaints.  Reports fetal movement.  Denies contractions, leakage of fluid, vaginal bleeding.  All questions answered.  SVE 3/50/-3.  VTX by U/S.  IOL scheduled 12/30/19.  ED precautions reviewed.

## 2019-12-29 NOTE — H&P
HISTORY AND PHYSICAL                                                OBSTETRICS          Subjective:      Niesha Pierre is a 24 y.o.  female with IUP at 39w0d weeks gestation who presents to L&D for induction of labor. Pertinent medical history for this pregnancy includes DVT earlier this year on lovenox 40 SC daily.  She denies contractions, vaginal bleeding, leakage of fluid.  Reports normal fetal movement. Care this pregnancy has been with Dr. Alarcon    PMHx: No past medical history on file.    PSHx: No past surgical history on file.    All: Review of patient's allergies indicates:  No Known Allergies    Meds:   No medications prior to admission.       SH:   Social History     Socioeconomic History    Marital status:      Spouse name: Not on file    Number of children: Not on file    Years of education: Not on file    Highest education level: Not on file   Occupational History    Not on file   Social Needs    Financial resource strain: Not on file    Food insecurity:     Worry: Not on file     Inability: Not on file    Transportation needs:     Medical: Not on file     Non-medical: Not on file   Tobacco Use    Smoking status: Former Smoker     Last attempt to quit: 2013     Years since quittin.7    Smokeless tobacco: Never Used   Substance and Sexual Activity    Alcohol use: Not Currently     Alcohol/week: 18.0 standard drinks     Types: 18 Cans of beer per week    Drug use: No    Sexual activity: Yes     Partners: Male   Lifestyle    Physical activity:     Days per week: Not on file     Minutes per session: Not on file    Stress: Not on file   Relationships    Social connections:     Talks on phone: Not on file     Gets together: Not on file     Attends Hindu service: Not on file     Active member of club or organization: Not on file     Attends meetings of clubs or organizations: Not on file     Relationship status: Not on file   Other Topics Concern     Not on file   Social History Narrative    Not on file       FH:   Family History   Problem Relation Age of Onset    Hypertension Mother     Hyperlipidemia Mother     Hypertension Father     Hyperlipidemia Father     Hypertension Maternal Uncle     Hypertension Paternal Uncle     Stroke Maternal Grandfather     Diabetes Paternal Grandmother     Cancer Paternal Grandfather 80        Pancreatic       OBHx:   OB History    Para Term  AB Living   1 0 0 0 0 0   SAB TAB Ectopic Multiple Live Births   0 0 0 0 0      # Outcome Date GA Lbr Akin/2nd Weight Sex Delivery Anes PTL Lv   1 Current                Objective:      LMP 2019   There is no height or weight on file to calculate BMI.    General:   alert and cooperative   HEENT:  normocephalic, atraumatic   Lungs:   clear to auscultation bilaterally   Heart:   regular rate and rhythm, S1, S2 normal   Abdomen:  gravid, non-tender   Extremities non-tender, no edema   Derm: no rashes or lesions   Psych: appropriate mood and affect   Pelvis:  adequate       Lab Review  Blood Type A POS  GBBS: negative   Rubella:   Lab Results   Component Value Date    RUBELLAIGGAN 41.7 2019    immune  RPR:   RPR   Date Value Ref Range Status   2019 Non-reactive Non-reactive Final      HIV:   Lab Results   Component Value Date    [id number] Negative 2019     HepB:   Hepatitis B Surface Ag   Date Value Ref Range Status   2019 Negative  Final        Assessment:     24 y.o.  at 39w0d weeks gestation here for induction of labor   Plan:        1. Risks, benefits, alternatives and possible complications have been discussed in detail with the patient. All questions have been answered, and Ms. Pierre has voiced understanding and agrees to the treatment plan.  2. Consents signed and in chart.  3. Admit to Labor and Delivery unit for induction of labor.

## 2019-12-30 ENCOUNTER — HOSPITAL ENCOUNTER (INPATIENT)
Facility: OTHER | Age: 24
LOS: 2 days | Discharge: HOME | End: 2020-01-01
Attending: STUDENT IN AN ORGANIZED HEALTH CARE EDUCATION/TRAINING PROGRAM | Admitting: STUDENT IN AN ORGANIZED HEALTH CARE EDUCATION/TRAINING PROGRAM
Payer: COMMERCIAL

## 2019-12-30 ENCOUNTER — ANESTHESIA (OUTPATIENT)
Dept: OBSTETRICS AND GYNECOLOGY | Facility: OTHER | Age: 24
End: 2019-12-30
Payer: COMMERCIAL

## 2019-12-30 ENCOUNTER — ANESTHESIA EVENT (OUTPATIENT)
Dept: OBSTETRICS AND GYNECOLOGY | Facility: OTHER | Age: 24
End: 2019-12-30
Payer: COMMERCIAL

## 2019-12-30 DIAGNOSIS — Z34.90 PREGNANCY: ICD-10-CM

## 2019-12-30 DIAGNOSIS — I82.432 ACUTE DEEP VEIN THROMBOSIS (DVT) OF POPLITEAL VEIN OF LEFT LOWER EXTREMITY: ICD-10-CM

## 2019-12-30 DIAGNOSIS — O22.30 DVT (DEEP VEIN THROMBOSIS) IN PREGNANCY: ICD-10-CM

## 2019-12-30 LAB
ABO + RH BLD: NORMAL
ALBUMIN SERPL BCP-MCNC: 3.4 G/DL (ref 3.5–5.2)
ALP SERPL-CCNC: 156 U/L (ref 55–135)
ALT SERPL W/O P-5'-P-CCNC: 14 U/L (ref 10–44)
ANION GAP SERPL CALC-SCNC: 13 MMOL/L (ref 8–16)
AST SERPL-CCNC: 15 U/L (ref 10–40)
BASOPHILS # BLD AUTO: 0.07 K/UL (ref 0–0.2)
BASOPHILS NFR BLD: 0.6 % (ref 0–1.9)
BILIRUB SERPL-MCNC: 0.2 MG/DL (ref 0.1–1)
BLD GP AB SCN CELLS X3 SERPL QL: NORMAL
BUN SERPL-MCNC: 7 MG/DL (ref 6–20)
CALCIUM SERPL-MCNC: 9.6 MG/DL (ref 8.7–10.5)
CHLORIDE SERPL-SCNC: 107 MMOL/L (ref 95–110)
CO2 SERPL-SCNC: 20 MMOL/L (ref 23–29)
CREAT SERPL-MCNC: 0.7 MG/DL (ref 0.5–1.4)
DIFFERENTIAL METHOD: ABNORMAL
EOSINOPHIL # BLD AUTO: 0.2 K/UL (ref 0–0.5)
EOSINOPHIL NFR BLD: 1.3 % (ref 0–8)
ERYTHROCYTE [DISTWIDTH] IN BLOOD BY AUTOMATED COUNT: 12.5 % (ref 11.5–14.5)
EST. GFR  (AFRICAN AMERICAN): >60 ML/MIN/1.73 M^2
EST. GFR  (NON AFRICAN AMERICAN): >60 ML/MIN/1.73 M^2
GLUCOSE SERPL-MCNC: 79 MG/DL (ref 70–110)
HCT VFR BLD AUTO: 38.3 % (ref 37–48.5)
HGB BLD-MCNC: 12.7 G/DL (ref 12–16)
IMM GRANULOCYTES # BLD AUTO: 0.1 K/UL (ref 0–0.04)
IMM GRANULOCYTES NFR BLD AUTO: 0.8 % (ref 0–0.5)
LYMPHOCYTES # BLD AUTO: 2.6 K/UL (ref 1–4.8)
LYMPHOCYTES NFR BLD: 20.7 % (ref 18–48)
MCH RBC QN AUTO: 28.2 PG (ref 27–31)
MCHC RBC AUTO-ENTMCNC: 33.2 G/DL (ref 32–36)
MCV RBC AUTO: 85 FL (ref 82–98)
MONOCYTES # BLD AUTO: 1 K/UL (ref 0.3–1)
MONOCYTES NFR BLD: 7.8 % (ref 4–15)
NEUTROPHILS # BLD AUTO: 8.7 K/UL (ref 1.8–7.7)
NEUTROPHILS NFR BLD: 68.8 % (ref 38–73)
NRBC BLD-RTO: 0 /100 WBC
PLATELET # BLD AUTO: 244 K/UL (ref 150–350)
PMV BLD AUTO: 10.5 FL (ref 9.2–12.9)
POTASSIUM SERPL-SCNC: 3.6 MMOL/L (ref 3.5–5.1)
PROT SERPL-MCNC: 7.4 G/DL (ref 6–8.4)
RBC # BLD AUTO: 4.51 M/UL (ref 4–5.4)
SODIUM SERPL-SCNC: 140 MMOL/L (ref 136–145)
WBC # BLD AUTO: 12.62 K/UL (ref 3.9–12.7)

## 2019-12-30 PROCEDURE — 25000003 PHARM REV CODE 250: Performed by: OBSTETRICS & GYNECOLOGY

## 2019-12-30 PROCEDURE — 86850 RBC ANTIBODY SCREEN: CPT

## 2019-12-30 PROCEDURE — 62326 NJX INTERLAMINAR LMBR/SAC: CPT | Performed by: ANESTHESIOLOGY

## 2019-12-30 PROCEDURE — 72200005 HC VAGINAL DELIVERY LEVEL II

## 2019-12-30 PROCEDURE — 63600175 PHARM REV CODE 636 W HCPCS: Performed by: STUDENT IN AN ORGANIZED HEALTH CARE EDUCATION/TRAINING PROGRAM

## 2019-12-30 PROCEDURE — 27200710 HC EPIDURAL INFUSION PUMP SET: Performed by: ANESTHESIOLOGY

## 2019-12-30 PROCEDURE — 80053 COMPREHEN METABOLIC PANEL: CPT

## 2019-12-30 PROCEDURE — 59400 PRA FULL ROUT OBSTE CARE,VAGINAL DELIV: ICD-10-PCS | Mod: AA,,, | Performed by: ANESTHESIOLOGY

## 2019-12-30 PROCEDURE — 59400 OBSTETRICAL CARE: CPT | Mod: AA,,, | Performed by: ANESTHESIOLOGY

## 2019-12-30 PROCEDURE — 72100002 HC LABOR CARE, 1ST 8 HOURS

## 2019-12-30 PROCEDURE — 11000001 HC ACUTE MED/SURG PRIVATE ROOM

## 2019-12-30 PROCEDURE — 63600175 PHARM REV CODE 636 W HCPCS: Performed by: OBSTETRICS & GYNECOLOGY

## 2019-12-30 PROCEDURE — 59400 OBSTETRICAL CARE: CPT | Mod: ,,, | Performed by: STUDENT IN AN ORGANIZED HEALTH CARE EDUCATION/TRAINING PROGRAM

## 2019-12-30 PROCEDURE — C1751 CATH, INF, PER/CENT/MIDLINE: HCPCS | Performed by: ANESTHESIOLOGY

## 2019-12-30 PROCEDURE — 51702 INSERT TEMP BLADDER CATH: CPT

## 2019-12-30 PROCEDURE — 59400 PR FULL ROUT OBSTE CARE,VAGINAL DELIV: ICD-10-PCS | Mod: ,,, | Performed by: STUDENT IN AN ORGANIZED HEALTH CARE EDUCATION/TRAINING PROGRAM

## 2019-12-30 PROCEDURE — 85025 COMPLETE CBC W/AUTO DIFF WBC: CPT

## 2019-12-30 RX ORDER — OXYTOCIN/RINGER'S LACTATE 30/500 ML
95 PLASTIC BAG, INJECTION (ML) INTRAVENOUS ONCE
Status: DISCONTINUED | OUTPATIENT
Start: 2019-12-30 | End: 2019-12-30

## 2019-12-30 RX ORDER — OXYTOCIN/RINGER'S LACTATE 30/500 ML
2 PLASTIC BAG, INJECTION (ML) INTRAVENOUS CONTINUOUS
Status: DISCONTINUED | OUTPATIENT
Start: 2019-12-30 | End: 2019-12-30

## 2019-12-30 RX ORDER — OXYCODONE AND ACETAMINOPHEN 10; 325 MG/1; MG/1
1 TABLET ORAL EVERY 4 HOURS PRN
Status: DISCONTINUED | OUTPATIENT
Start: 2019-12-30 | End: 2020-01-01 | Stop reason: HOSPADM

## 2019-12-30 RX ORDER — OXYCODONE AND ACETAMINOPHEN 5; 325 MG/1; MG/1
1 TABLET ORAL EVERY 4 HOURS PRN
Status: DISCONTINUED | OUTPATIENT
Start: 2019-12-30 | End: 2020-01-01 | Stop reason: HOSPADM

## 2019-12-30 RX ORDER — OXYTOCIN/RINGER'S LACTATE 30/500 ML
334 PLASTIC BAG, INJECTION (ML) INTRAVENOUS ONCE
Status: COMPLETED | OUTPATIENT
Start: 2019-12-30 | End: 2019-12-30

## 2019-12-30 RX ORDER — SIMETHICONE 80 MG
1 TABLET,CHEWABLE ORAL EVERY 6 HOURS PRN
Status: DISCONTINUED | OUTPATIENT
Start: 2019-12-30 | End: 2020-01-01 | Stop reason: HOSPADM

## 2019-12-30 RX ORDER — DIPHENHYDRAMINE HCL 25 MG
25 CAPSULE ORAL EVERY 4 HOURS PRN
Status: DISCONTINUED | OUTPATIENT
Start: 2019-12-30 | End: 2020-01-01 | Stop reason: HOSPADM

## 2019-12-30 RX ORDER — DIPHENHYDRAMINE HYDROCHLORIDE 50 MG/ML
25 INJECTION INTRAMUSCULAR; INTRAVENOUS EVERY 4 HOURS PRN
Status: DISCONTINUED | OUTPATIENT
Start: 2019-12-30 | End: 2020-01-01 | Stop reason: HOSPADM

## 2019-12-30 RX ORDER — DIPHENOXYLATE HYDROCHLORIDE AND ATROPINE SULFATE 2.5; .025 MG/1; MG/1
1 TABLET ORAL 4 TIMES DAILY PRN
Status: DISCONTINUED | OUTPATIENT
Start: 2019-12-30 | End: 2019-12-30

## 2019-12-30 RX ORDER — OXYTOCIN 10 [USP'U]/ML
10 INJECTION, SOLUTION INTRAMUSCULAR; INTRAVENOUS
Status: DISCONTINUED | OUTPATIENT
Start: 2019-12-30 | End: 2019-12-30

## 2019-12-30 RX ORDER — ACETAMINOPHEN 325 MG/1
650 TABLET ORAL EVERY 6 HOURS PRN
Status: DISCONTINUED | OUTPATIENT
Start: 2019-12-30 | End: 2019-12-30

## 2019-12-30 RX ORDER — ONDANSETRON 2 MG/ML
4 INJECTION INTRAMUSCULAR; INTRAVENOUS EVERY 6 HOURS PRN
Status: DISCONTINUED | OUTPATIENT
Start: 2019-12-30 | End: 2019-12-30

## 2019-12-30 RX ORDER — HYDROCORTISONE 25 MG/G
CREAM TOPICAL 3 TIMES DAILY PRN
Status: DISCONTINUED | OUTPATIENT
Start: 2019-12-30 | End: 2020-01-01 | Stop reason: HOSPADM

## 2019-12-30 RX ORDER — SODIUM CHLORIDE, SODIUM LACTATE, POTASSIUM CHLORIDE, CALCIUM CHLORIDE 600; 310; 30; 20 MG/100ML; MG/100ML; MG/100ML; MG/100ML
INJECTION, SOLUTION INTRAVENOUS CONTINUOUS
Status: DISCONTINUED | OUTPATIENT
Start: 2019-12-30 | End: 2019-12-30

## 2019-12-30 RX ORDER — MISOPROSTOL 200 UG/1
800 TABLET ORAL
Status: DISCONTINUED | OUTPATIENT
Start: 2019-12-30 | End: 2019-12-30

## 2019-12-30 RX ORDER — CEFAZOLIN SODIUM 2 G/50ML
2 SOLUTION INTRAVENOUS ONCE AS NEEDED
Status: DISCONTINUED | OUTPATIENT
Start: 2019-12-30 | End: 2019-12-30

## 2019-12-30 RX ORDER — IBUPROFEN 600 MG/1
600 TABLET ORAL EVERY 6 HOURS PRN
Status: DISCONTINUED | OUTPATIENT
Start: 2019-12-30 | End: 2020-01-01 | Stop reason: HOSPADM

## 2019-12-30 RX ORDER — OXYTOCIN/RINGER'S LACTATE 30/500 ML
95 PLASTIC BAG, INJECTION (ML) INTRAVENOUS ONCE
Status: CANCELLED | OUTPATIENT
Start: 2019-12-30 | End: 2019-12-30

## 2019-12-30 RX ORDER — ACETAMINOPHEN 325 MG/1
650 TABLET ORAL EVERY 6 HOURS PRN
Status: DISCONTINUED | OUTPATIENT
Start: 2019-12-30 | End: 2020-01-01 | Stop reason: HOSPADM

## 2019-12-30 RX ORDER — PRENATAL WITH FERROUS FUM AND FOLIC ACID 3080; 920; 120; 400; 22; 1.84; 3; 20; 10; 1; 12; 200; 27; 25; 2 [IU]/1; [IU]/1; MG/1; [IU]/1; MG/1; MG/1; MG/1; MG/1; MG/1; MG/1; UG/1; MG/1; MG/1; MG/1; MG/1
1 TABLET ORAL DAILY
Status: DISCONTINUED | OUTPATIENT
Start: 2019-12-30 | End: 2020-01-01 | Stop reason: HOSPADM

## 2019-12-30 RX ORDER — OXYTOCIN/RINGER'S LACTATE 30/500 ML
95 PLASTIC BAG, INJECTION (ML) INTRAVENOUS ONCE
Status: COMPLETED | OUTPATIENT
Start: 2019-12-30 | End: 2019-12-30

## 2019-12-30 RX ORDER — METHYLERGONOVINE MALEATE 0.2 MG/ML
200 INJECTION INTRAVENOUS
Status: DISCONTINUED | OUTPATIENT
Start: 2019-12-30 | End: 2019-12-30

## 2019-12-30 RX ORDER — CARBOPROST TROMETHAMINE 250 UG/ML
250 INJECTION, SOLUTION INTRAMUSCULAR
Status: DISCONTINUED | OUTPATIENT
Start: 2019-12-30 | End: 2019-12-30

## 2019-12-30 RX ORDER — ONDANSETRON 8 MG/1
8 TABLET, ORALLY DISINTEGRATING ORAL EVERY 8 HOURS PRN
Status: DISCONTINUED | OUTPATIENT
Start: 2019-12-30 | End: 2020-01-01 | Stop reason: HOSPADM

## 2019-12-30 RX ORDER — MISOPROSTOL 100 MCG
25 TABLET ORAL EVERY 4 HOURS
Status: DISCONTINUED | OUTPATIENT
Start: 2019-12-30 | End: 2019-12-30

## 2019-12-30 RX ORDER — ENOXAPARIN SODIUM 100 MG/ML
40 INJECTION SUBCUTANEOUS EVERY 24 HOURS
Status: DISCONTINUED | OUTPATIENT
Start: 2019-12-30 | End: 2020-01-01 | Stop reason: HOSPADM

## 2019-12-30 RX ORDER — DOCUSATE SODIUM 100 MG/1
200 CAPSULE, LIQUID FILLED ORAL 2 TIMES DAILY PRN
Status: DISCONTINUED | OUTPATIENT
Start: 2019-12-30 | End: 2020-01-01 | Stop reason: HOSPADM

## 2019-12-30 RX ORDER — SODIUM CHLORIDE 9 MG/ML
INJECTION, SOLUTION INTRAVENOUS
Status: DISCONTINUED | OUTPATIENT
Start: 2019-12-30 | End: 2019-12-30

## 2019-12-30 RX ORDER — TERBUTALINE SULFATE 1 MG/ML
0.25 INJECTION SUBCUTANEOUS
Status: DISCONTINUED | OUTPATIENT
Start: 2019-12-30 | End: 2019-12-30

## 2019-12-30 RX ADMIN — Medication 95 MILLI-UNITS/MIN: at 11:12

## 2019-12-30 RX ADMIN — IBUPROFEN 600 MG: 600 TABLET, FILM COATED ORAL at 03:12

## 2019-12-30 RX ADMIN — ENOXAPARIN SODIUM 40 MG: 100 INJECTION SUBCUTANEOUS at 08:12

## 2019-12-30 RX ADMIN — ACETAMINOPHEN 650 MG: 325 TABLET ORAL at 09:12

## 2019-12-30 RX ADMIN — DOCUSATE SODIUM 200 MG: 100 CAPSULE, LIQUID FILLED ORAL at 03:12

## 2019-12-30 RX ADMIN — IBUPROFEN 600 MG: 600 TABLET, FILM COATED ORAL at 08:12

## 2019-12-30 RX ADMIN — Medication 2 MILLI-UNITS/MIN: at 06:12

## 2019-12-30 RX ADMIN — SODIUM CHLORIDE, SODIUM LACTATE, POTASSIUM CHLORIDE, AND CALCIUM CHLORIDE: .6; .31; .03; .02 INJECTION, SOLUTION INTRAVENOUS at 02:12

## 2019-12-30 RX ADMIN — ACETAMINOPHEN 650 MG: 325 TABLET ORAL at 06:12

## 2019-12-30 RX ADMIN — SODIUM CHLORIDE, SODIUM LACTATE, POTASSIUM CHLORIDE, AND CALCIUM CHLORIDE 1000 ML: .6; .31; .03; .02 INJECTION, SOLUTION INTRAVENOUS at 04:12

## 2019-12-30 RX ADMIN — Medication 334 MILLI-UNITS/MIN: at 10:12

## 2019-12-30 NOTE — DISCHARGE INSTRUCTIONS
Breastfeeding Discharge Instructions       Feed the baby at the earliest sign of hunger or comfort  o Hands to mouth, sucking motions  o Rooting or searching for something to suck on  o Dont wait for crying - it is a sign of distress     The feedings may be 8-12 times per 24hrs and will not follow a schedule   Avoid pacifiers and bottles for the first 4 weeks   Alternate the breast you start the feeding with, or start with the breast that feels the fullest   Switch breasts when the baby takes himself off the breast or falls asleep   Keep offering breasts until the baby looks full, no longer gives hunger signs, and stays asleep when placed on his back in the crib   If the baby is sleepy and wont wake for a feeding, put the baby skin-to-skin dressed in a diaper against the mothers bare chest   Sleep near your baby   The baby should be positioned and latched on to the breast correctly  o Chest-to-chest, chin in the breast  o Babys lips are flipped outward  o Babys mouth is stretched open wide like a shout  o Babys sucking should feel like tugging to the mother  - The baby should be drinking at the breast:  o You should hear swallowing or gulping throughout the feeding  o You should see milk on the babys lips when he comes off the breast  o Your breasts should be softer when the baby is finished feeding  o The baby should look relaxed at the end of feedings  o After the 4th day and your milk is in:  o The babys poop should turn bright yellow and be loose, watery, and seedy  o The baby should have at least 3-4 poops the size of the palm of your hand per day  o The baby should have at least 5-6 wet diapers per day  o The urine should be light yellow in color  You should drink when you are thirsty and eat a healthy diet when you are    hungry.     Take naps to get the rest you need.   Take medications and/or drink alcohol only with permission of your obstetrician    or the babys pediatrician.  You can  also call the [facility] Risk [facility],   ([phone]), Monday-Friday, 8am-5pm Central time, to get the most   up-to-date evidence-based information on the use of medications during   pregnancy and breastfeeding.      The baby should be examined by a pediatrician at 3-5 days of age.  Once your   milk comes in, the baby should be gaining at least ½ - 1oz each day and should be back to birthweight no later than 10-14 days of age.          Community Resources    [facility] Breastfeeding Warmline: [phone]   Local [facility] clinics: provide incentives and breastpumps to eligible mothers  La L[employer]he [employer] (LLLI):  mother-to-mother support group website        www.[employer]l.Gravie  Local La Leche League mother-to-mother support groups:        www.Codewars        La Leche League [facility]   Dr. Paras Goode website for latch videos and general information:        www.breastfeedinginc.ca  [facility] is a call center that provides information about the safety of taking medications while breastfeeding.  Call 1-597.157.6987, M-F, 8am-5pm, CT.  International Lactation Consultant Association provides resources for assistance:        www.ilca.org  [place] Breastfeeding Coalition provides informationand resources for parents  and the community    www.LaBreastfeedingSupport.org     Carla Langley is a mom-to-mom support group:                             www.[employer]juan[employer]istics.com//breastfeedng-support/  [employer] for Healthy Babies:  7-897-103-BABY(0370)  [employer]: a breastfeeding support group for women of color, [phone]

## 2019-12-30 NOTE — ANESTHESIA PREPROCEDURE EVALUATION
Niesha Pierre is a 24 y.o. female  with hx of DVT currently on lovenox 40mg, last dose at 16:00 19 who presents for active labor.    OB History    Para Term  AB Living   1             SAB TAB Ectopic Multiple Live Births                  # Outcome Date GA Lbr Akin/2nd Weight Sex Delivery Anes PTL Lv   1 Current                Wt Readings from Last 1 Encounters:   19 0852 69.8 kg (153 lb 14.1 oz)       BP Readings from Last 3 Encounters:   19 100/70   19 100/70   19 118/66       Patient Active Problem List   Diagnosis    Vitamin D insufficiency    Acute deep vein thrombosis (DVT) of popliteal vein of left lower extremity    16 weeks gestation of pregnancy    38 weeks gestation of pregnancy    Pregnancy       History reviewed. No pertinent surgical history.    Social History     Socioeconomic History    Marital status:      Spouse name: Not on file    Number of children: Not on file    Years of education: Not on file    Highest education level: Not on file   Occupational History    Not on file   Social Needs    Financial resource strain: Not on file    Food insecurity:     Worry: Not on file     Inability: Not on file    Transportation needs:     Medical: Not on file     Non-medical: Not on file   Tobacco Use    Smoking status: Former Smoker     Last attempt to quit: 2013     Years since quittin.7    Smokeless tobacco: Never Used   Substance and Sexual Activity    Alcohol use: Not Currently     Alcohol/week: 18.0 standard drinks     Types: 18 Cans of beer per week    Drug use: No    Sexual activity: Yes     Partners: Male   Lifestyle    Physical activity:     Days per week: Not on file     Minutes per session: Not on file    Stress: Not on file   Relationships    Social connections:     Talks on phone: Not on file     Gets together: Not on file     Attends Adventist service: Not on file     Active member of club or  organization: Not on file     Attends meetings of clubs or organizations: Not on file     Relationship status: Not on file   Other Topics Concern    Not on file   Social History Narrative    Not on file         Chemistry        Component Value Date/Time     12/30/2019 0245    K 3.6 12/30/2019 0245     12/30/2019 0245    CO2 20 (L) 12/30/2019 0245    BUN 7 12/30/2019 0245    CREATININE 0.7 12/30/2019 0245    GLU 79 12/30/2019 0245        Component Value Date/Time    CALCIUM 9.6 12/30/2019 0245    ALKPHOS 156 (H) 12/30/2019 0245    AST 15 12/30/2019 0245    ALT 14 12/30/2019 0245    BILITOT 0.2 12/30/2019 0245    ESTGFRAFRICA >60 12/30/2019 0245    EGFRNONAA >60 12/30/2019 0245            Lab Results   Component Value Date    WBC 12.62 12/30/2019    HGB 12.7 12/30/2019    HCT 38.3 12/30/2019    MCV 85 12/30/2019     12/30/2019       No results for input(s): PT, INR, PROTIME, APTT in the last 72 hours.                                                                                                                       12/30/2019  Niesha Pierre is a 24 y.o., female.    Anesthesia Evaluation    I have reviewed the Patient Summary Reports.     I have reviewed the Medications.     Review of Systems  Anesthesia Hx:  No problems with previous Anesthesia   Denies Personal Hx of Anesthesia complications.   Social:  Former Smoker, No Alcohol Use    Hematology/Oncology:  Hematology Normal   Oncology Normal     EENT/Dental:EENT/Dental Normal   Cardiovascular:   DVT, last lovenox dose at 16:00 12/29/19   Pulmonary:  Pulmonary Normal    Renal/:  Renal/ Normal     Hepatic/GI:  Hepatic/GI Normal    Musculoskeletal:  Musculoskeletal Normal    Neurological:  Neurology Normal    Endocrine:  Endocrine Normal    Psych:  Psychiatric Normal           Physical Exam  General:  Well nourished    Airway/Jaw/Neck:  Airway Findings: Mouth Opening: Normal Tongue: Normal  General Airway Assessment: Adult   Mallampati: II  TM Distance: Normal, at least 6 cm  Jaw/Neck Findings:  Neck ROM: Normal ROM     Eyes/Ears/Nose:  EYES/EARS/NOSE FINDINGS: Normal   Dental:  Dental Findings: In tact        Mental Status:  Mental Status Findings:  Cooperative, Alert and Oriented         Anesthesia Plan  Type of Anesthesia, risks & benefits discussed:  Anesthesia Type:  CSE, epidural, spinal, general  Patient's Preference:   Intra-op Monitoring Plan: standard ASA monitors  Intra-op Monitoring Plan Comments:   Post Op Pain Control Plan: multimodal analgesia, IV/PO Opioids PRN and per primary service following discharge from PACU  Post Op Pain Control Plan Comments:   Induction:   IV  Beta Blocker:  Patient is not currently on a Beta-Blocker (No further documentation required).       Informed Consent: Patient understands risks and agrees with Anesthesia plan.  Questions answered. Anesthesia consent signed with patient.  ASA Score: 2     Day of Surgery Review of History & Physical:    H&P update referred to the surgeon.         Ready For Surgery From Anesthesia Perspective.

## 2019-12-30 NOTE — INTERVAL H&P NOTE
The patient has been examined and the H&P has been reviewed:    I concur with the findings and changes have been noted since the H&P was written:     She presents with contractions that started around 11pm, no gush of fluid or vaginal bleeding.  Baby movement decreased.   Last took lovenox at 4pm yesterday  She had DVT in Februrary, unprovoked    NST:   Baseline 125  Accels present  Decels absent  Variability moderate  Ctx q 2-5  Interpretation: cat 1 NST      Can't get epidural yet, and pt laboring  Will not start pit/rupture until after 4am    Sushila Guerrero DO

## 2019-12-30 NOTE — PROGRESS NOTES
"Patient notes pressure    /69   Pulse 85   Temp 97.9 °F (36.6 °C)   Resp 18   Ht 5' 5" (1.651 m)   Wt 70 kg (154 lb 5.2 oz)   LMP 03/12/2019   SpO2 96%   Breastfeeding? No   BMI 25.68 kg/m²     NST reactive/reassuring  CTX every 2 minutes  SVE 9/90/-1 per RN    Recheck 1 hour  "

## 2019-12-30 NOTE — ANESTHESIA PROCEDURE NOTES
Epidural    Patient location during procedure: OB   Reason for block: primary anesthetic   Diagnosis: IUP   Start time: 12/30/2019 4:35 AM  Timeout: 12/30/2019 4:30 AM  End time: 12/30/2019 4:40 AM    Staffing  Performing Provider: Danielle Deal MD  Authorizing Provider: Danielle Deal MD        Preanesthetic Checklist  Completed: patient identified, site marked, surgical consent, pre-op evaluation, timeout performed, IV checked, risks and benefits discussed, monitors and equipment checked, anesthesia consent given, hand hygiene performed and patient being monitored  Preparation  Patient position: sitting  Prep: ChloraPrep  Patient monitoring: Pulse Ox and Blood Pressure  Epidural  Skin Anesthetic: lidocaine 1%  Administration type: single shot  Approach: midline  Interspace: L3-4    Injection technique: LUKE air  Needle and Epidural Catheter  Needle type: Tuohy   Needle gauge: 17  Needle length: 3.5 inches  Needle insertion depth: 6 cm  Catheter at skin depth: 10 cm  Test dose: 3 mL of lidocaine 1.5% with Epi 1-to-200,000  Additional Documentation: incremental injection, negative aspiration for heme and CSF, no significant pain on injection, no significant complaints from patient, no signs/symptoms of IV or SA injection and no paresthesia on injection  Needle localization: anatomical landmarks  Medications:  Volume per aspiration: 5 mL  Time between aspirations: 5 minutes  Assessment  Ease of block: easy  Patient's tolerance of the procedure: comfortable throughout block and no complaints  Additional Notes  Possible wet tap at L4-5 level, small amount of fluid noted at Touhy hub at 4cm depth. Needle withdrawn and epidural placed at L3-4 level easily at depth of 6cm. Patient given incremental doses of bupivacaine 0.1% with fentanyl 2mch /ml to total of 10ml. Dural puncture not performed with spinal needle

## 2019-12-30 NOTE — L&D DELIVERY NOTE
[facility]  Vaginal Delivery   Operative Note    SUMMARY     Normal spontaneous vaginal delivery of live infant, was placed on mothers abdomen for skin to skin and bulb suctioning performed.  Infant delivered position MICHAEL over intact perineum.  Nuchal cord: No.    Spontaneous delivery of placenta and IV pitocin given noting good uterine tone.  2nd degree laceration noted and repaired in normal fashion with 2-0 vicryl sutre.  Patient tolerated delivery well. Sponge needle and lap counted correctly x2.    Indications: Vaginal delivery  Pregnancy complicated by:   Patient Active Problem List   Diagnosis    Vitamin D insufficiency    Acute deep vein thrombosis (DVT) of popliteal vein of left lower extremity    16 weeks gestation of pregnancy    38 weeks gestation of pregnancy    Pregnancy    Vaginal delivery     Admitting GA: 39w1d    Delivery Information for Kip Pierre    Birth information:  YOB: 2019   Time of birth: 10:33 AM   Sex: female   Head Delivery Date/Time: 12/30/2019 10:33 AM   Delivery type: Vaginal, Spontaneous   Gestational Age: 39w1d    Delivery Providers    Delivering clinician:  Tahmina Alarcon MD   Provider Role    Brooklyn Jones RN Delivery Nurse    Sanna Grier RN Charge Nurse            Measurements    Weight:  3062 g  Length:  48.9 cm  Head circumference:  33.7 cm  Chest circumference:  33 cm         Apgars    Living status:  Living  Apgars:   1 min.:   5 min.:   10 min.:   15 min.:   20 min.:     Skin color:   1  1       Heart rate:   2  2       Reflex irritability:   2  2       Muscle tone:   2  2       Respiratory effort:   2  2       Total:   9  9       Apgars assigned by:  SONU JONES RN         Operative Delivery    Forceps attempted?:  No  Vacuum extractor attempted?:  No         Shoulder Dystocia    Shoulder dystocia present?:  No           Presentation    Presentation:  Vertex  Position:  Left Occiput Anterior            Interventions/Resuscitation    Method:  Bulb Suctioning, Tactile Stimulation       Cord    Vessels:  3 vessels  Complications:  None  Delayed Cord Clamping?:  Yes  Cord Clamped Date/Time:  2019 10:35 AM  Cord Blood Disposition:  Sent with Baby       Placenta    Placenta delivery date/time:  2019 1040  Placenta removal:  Spontaneous  Placenta appearance:  Intact  Placenta disposition:  discarded           Labor Events:       labor: No     Labor Onset Date/Time:         Dilation Complete Date/Time:         Start Pushing Date/Time:         Start Pushing Date/Time:       Rupture Date/Time: 19         Rupture type:           Fluid Amount:        Fluid Color: Clear      Fluid Odor:        Membrane Status: ARM (Artificial Rupture)               steroids: None     Antibiotics given for GBS: No     Induction: oxytocin     Indications for induction:  Elective     Augmentation: amniotomy;oxytocin     Indications for augmentation:       Labor complications: None     Additional complications:          Cervical ripening:                     Delivery:      Episiotomy: None     Indication for Episiotomy:       Perineal Lacerations: 2nd Repaired:  Yes   Periurethral Laceration:   Repaired:     Labial Laceration:   Repaired:     Sulcus Laceration:   Repaired:     Vaginal Laceration:   Repaired:     Cervical Laceration:   Repaired:     Repair suture:       Repair # of packets: 2     Last Value - EBL - Nursing (mL): 300     Sum - EBL - Nursing (mL): 300     Last Value - EBL - Anesthesia (mL):      Calculated QBL (mL):        Vaginal Sweep Performed: Yes     Surgicount Correct:         Other providers:       Anesthesia    Method:  Epidural          Details (if applicable):  Trial of Labor      Categorization:      Priority:     Indications for :     Incision Type:       Additional  information:  Forceps:    Vacuum:    Breech:    Observed anomalies     Other (Comments):

## 2019-12-30 NOTE — LACTATION NOTE
12/30/19 1400   Maternal Assessment   Breast Shape Bilateral:;pendulous   Breast Density soft;Right:   Areola elastic;Right:   Nipples Right:;everted;graspable  (semi flat)   Maternal Infant Feeding   Maternal Emotional State assist needed   Infant Positioning clutch/football   Signs of Milk Transfer audible swallow;infant jaw motion present   Latch Assistance yes   assisted pt with position and latch. Baby able to latch to breast in football hold after a few attempts.good tugs and pulls observed. Pt shown how to use breast compression and stimulation to keep baby actively nursing. Basic breastfeeding education provided. Questions answered. Encouraged skin to skin.

## 2019-12-30 NOTE — PROGRESS NOTES
Labor Progress Note        Subjective:      Patient currently doing well without complaints    Objective:      Temp:  [98.1 °F (36.7 °C)] 98.1 °F (36.7 °C)  Pulse:  [] 85  Resp:  [19] 19  SpO2:  [97 %-99 %] 97 %  BP: (119-134)/(73-91) 127/81  There is no height or weight on file to calculate BMI.     General: no acute distress  Electronic Fetal Monitoring:  FHT: 130 bpm, moderate variability, accelerations present, decelerations absent   Category: 1                 TOCO: Contractions: irregular, every 2-5 minutes     6/90/-2, AROMed clear     Assessment:     1. IUP at 39.1 here for induction of labor  2. GBS negative   3. H/o dvt, last lovenox yesterday at 4pm  4. AROMed 0630 clear  5. Category 1 FHT     Plan:     1. Continue active management of labor  2. Start pitocin    Sushila Guerrero, DO

## 2019-12-31 PROBLEM — Z34.90 PREGNANCY: Status: RESOLVED | Noted: 2019-12-30 | Resolved: 2019-12-31

## 2019-12-31 LAB
BASOPHILS # BLD AUTO: 0.08 K/UL (ref 0–0.2)
BASOPHILS NFR BLD: 0.6 % (ref 0–1.9)
DIFFERENTIAL METHOD: ABNORMAL
EOSINOPHIL # BLD AUTO: 0.2 K/UL (ref 0–0.5)
EOSINOPHIL NFR BLD: 1.7 % (ref 0–8)
ERYTHROCYTE [DISTWIDTH] IN BLOOD BY AUTOMATED COUNT: 12.6 % (ref 11.5–14.5)
HCT VFR BLD AUTO: 29.9 % (ref 37–48.5)
HGB BLD-MCNC: 10 G/DL (ref 12–16)
IMM GRANULOCYTES # BLD AUTO: 0.08 K/UL (ref 0–0.04)
IMM GRANULOCYTES NFR BLD AUTO: 0.6 % (ref 0–0.5)
LYMPHOCYTES # BLD AUTO: 2.6 K/UL (ref 1–4.8)
LYMPHOCYTES NFR BLD: 19.6 % (ref 18–48)
MCH RBC QN AUTO: 28.9 PG (ref 27–31)
MCHC RBC AUTO-ENTMCNC: 33.4 G/DL (ref 32–36)
MCV RBC AUTO: 86 FL (ref 82–98)
MONOCYTES # BLD AUTO: 1 K/UL (ref 0.3–1)
MONOCYTES NFR BLD: 7.4 % (ref 4–15)
NEUTROPHILS # BLD AUTO: 9.4 K/UL (ref 1.8–7.7)
NEUTROPHILS NFR BLD: 70.1 % (ref 38–73)
NRBC BLD-RTO: 0 /100 WBC
PLATELET # BLD AUTO: 176 K/UL (ref 150–350)
PMV BLD AUTO: 10.4 FL (ref 9.2–12.9)
RBC # BLD AUTO: 3.46 M/UL (ref 4–5.4)
WBC # BLD AUTO: 13.43 K/UL (ref 3.9–12.7)

## 2019-12-31 PROCEDURE — 36415 COLL VENOUS BLD VENIPUNCTURE: CPT

## 2019-12-31 PROCEDURE — 99024 PR POST-OP FOLLOW-UP VISIT: ICD-10-PCS | Mod: ,,, | Performed by: OBSTETRICS & GYNECOLOGY

## 2019-12-31 PROCEDURE — 11000001 HC ACUTE MED/SURG PRIVATE ROOM

## 2019-12-31 PROCEDURE — 63600175 PHARM REV CODE 636 W HCPCS: Performed by: OBSTETRICS & GYNECOLOGY

## 2019-12-31 PROCEDURE — 85025 COMPLETE CBC W/AUTO DIFF WBC: CPT

## 2019-12-31 PROCEDURE — 99024 POSTOP FOLLOW-UP VISIT: CPT | Mod: ,,, | Performed by: OBSTETRICS & GYNECOLOGY

## 2019-12-31 PROCEDURE — 25000003 PHARM REV CODE 250: Performed by: OBSTETRICS & GYNECOLOGY

## 2019-12-31 RX ORDER — ENOXAPARIN SODIUM 100 MG/ML
40 INJECTION SUBCUTANEOUS DAILY
Qty: 12 ML | Refills: 1 | Status: SHIPPED | OUTPATIENT
Start: 2019-12-31 | End: 2020-01-31 | Stop reason: SDUPTHER

## 2019-12-31 RX ORDER — DOCUSATE SODIUM 100 MG/1
200 CAPSULE, LIQUID FILLED ORAL 2 TIMES DAILY
Qty: 60 CAPSULE | Refills: 2 | OUTPATIENT
Start: 2019-12-31 | End: 2020-10-09

## 2019-12-31 RX ORDER — IBUPROFEN 600 MG/1
600 TABLET ORAL EVERY 6 HOURS PRN
Qty: 60 TABLET | Refills: 2 | OUTPATIENT
Start: 2019-12-31 | End: 2020-10-09

## 2019-12-31 RX ADMIN — DOCUSATE SODIUM 200 MG: 100 CAPSULE, LIQUID FILLED ORAL at 08:12

## 2019-12-31 RX ADMIN — IBUPROFEN 600 MG: 600 TABLET, FILM COATED ORAL at 11:12

## 2019-12-31 RX ADMIN — ACETAMINOPHEN 650 MG: 325 TABLET ORAL at 02:12

## 2019-12-31 RX ADMIN — ACETAMINOPHEN 650 MG: 325 TABLET ORAL at 11:12

## 2019-12-31 RX ADMIN — ENOXAPARIN SODIUM 40 MG: 100 INJECTION SUBCUTANEOUS at 08:12

## 2019-12-31 RX ADMIN — IBUPROFEN 600 MG: 600 TABLET, FILM COATED ORAL at 05:12

## 2019-12-31 RX ADMIN — PRENATAL VIT W/ FE FUMARATE-FA TAB 27-0.8 MG 1 TABLET: 27-0.8 TAB at 08:12

## 2019-12-31 RX ADMIN — ACETAMINOPHEN 650 MG: 325 TABLET ORAL at 04:12

## 2019-12-31 RX ADMIN — ACETAMINOPHEN 650 MG: 325 TABLET ORAL at 08:12

## 2019-12-31 RX ADMIN — DOCUSATE SODIUM 200 MG: 100 CAPSULE, LIQUID FILLED ORAL at 09:12

## 2019-12-31 RX ADMIN — IBUPROFEN 600 MG: 600 TABLET, FILM COATED ORAL at 04:12

## 2019-12-31 NOTE — LACTATION NOTE
LC visit to room. LC left phone number on mother's white board for mother to call for asst as needed.Told mother what time LC leaves the floor. Mother also told that LC can see when she calls [employer] phone and if LC does not answer, she is busy but will come as soon as possible. Mother would like to go home today. Will call LC to asst with latch and to do DC teaching for lactation.

## 2019-12-31 NOTE — ANESTHESIA POSTPROCEDURE EVALUATION
Anesthesia Post Evaluation    Patient: Niesha Pierre    Procedure(s) Performed: * No procedures listed *    Final Anesthesia Type: epidural    Patient location during evaluation: labor & delivery  Patient participation: Yes- Able to Participate  Level of consciousness: awake and alert, oriented and awake  Post-procedure vital signs: reviewed and stable  Pain management: adequate  Airway patency: patent    PONV status at discharge: No PONV  Anesthetic complications: yes  Perioperative Events: post-dural puncture headache  Pat-operative Events Comments: Patient with positional headache, does improve after time in the upright position. Discussed conservative and invasive options. Patient reports the headache is mild and she would contact us with any further concerns  Cardiovascular status: blood pressure returned to baseline  Respiratory status: unassisted, spontaneous ventilation and room air  Hydration status: euvolemic  Follow-up not needed.          Vitals Value Taken Time   /83 12/31/2019  9:34 AM   Temp 36.6 °C (97.8 °F) 12/31/2019  9:34 AM   Pulse 86 12/31/2019  9:34 AM   Resp 18 12/31/2019  9:34 AM   SpO2 98 % 12/31/2019  9:34 AM         No case tracking events are documented in the log.      Pain/Joel Score: Pain Rating Prior to Med Admin: 1 (12/31/2019  8:24 AM)  Pain Rating Post Med Admin: 0 (12/31/2019  9:22 AM)

## 2019-12-31 NOTE — PROGRESS NOTES
[facility]  Obstetrics  Postpartum Progress Note    Patient Name: Niesha Pierre  MRN: [id number]  Admission Date: 12/30/2019  Hospital Length of Stay: 1 days  Attending Physician: Tahmina Alarcon MD  Primary Care Provider: Jaquelin Morales MD    Subjective:     Principal Problem:Vaginal delivery    Hospital course: POD#1: Feeling well, denies complaints; desires early DC today    Interval History:     She is doing well this morning. She is tolerating a regular diet without nausea or vomiting. She is voiding spontaneously. She is ambulating. She has passed flatus, and has not a BM. Vaginal bleeding is mild. She denies fever or chills. Abdominal pain is mild and controlled with oral medications.     Objective:     Vital Signs (Most Recent):  Temp: 97.8 °F (36.6 °C) (12/31/19 0934)  Pulse: 86 (12/31/19 0934)  Resp: 18 (12/31/19 0934)  BP: 116/83 (12/31/19 0934)  SpO2: 98 % (12/31/19 0934) Vital Signs (24h Range):  Temp:  [97.8 °F (36.6 °C)-98.1 °F (36.7 °C)] 97.8 °F (36.6 °C)  Pulse:  [] 86  Resp:  [18] 18  SpO2:  [96 %-99 %] 98 %  BP: (116-121)/(76-83) 116/83     Weight: 70 kg (154 lb 5.2 oz)  Body mass index is 25.68 kg/m².      Intake/Output Summary (Last 24 hours) at 12/31/2019 1327  Last data filed at 12/30/2019 1715  Gross per 24 hour   Intake --   Output 1200 ml   Net -1200 ml       Significant Labs:  Lab Results   Component Value Date    GROUPTRH A POS 12/30/2019    HEPBSAG Negative 06/07/2019    STREPBCULT No Group B Streptococcus isolated 12/06/2019     Recent Labs   Lab 12/31/19  0412   HGB 10.0*   HCT 29.9*       I have personallly reviewed all pertinent lab results from the last 24 hours.    Physical Exam:   Constitutional: She is oriented to person, place, and time. She appears well-developed and well-nourished. No distress.    HENT:   Head: Normocephalic and atraumatic.      Cardiovascular: Normal rate, regular rhythm, normal heart sounds and intact distal pulses.      Pulmonary/Chest: Effort normal. No respiratory distress.        Abdominal: Soft. Bowel sounds are normal. She exhibits no mass.   Firm fundus below umbilicus             Musculoskeletal: Normal range of motion and moves all extremeties.       Neurological: She is alert and oriented to person, place, and time.    Skin: Skin is warm. She is not diaphoretic.    Psychiatric: She has a normal mood and affect. Her behavior is normal. Judgment and thought content normal.       Assessment/Plan:     24 y.o. female  for:    * Vaginal delivery  Routine post partum care  Will Rx Lovenox for PP (hx DVT)        Disposition: As patient meets milestones, will plan to discharge today if infant can go home today.    Radha Rdz MD  Obstetrics  [facility]

## 2019-12-31 NOTE — SUBJECTIVE & OBJECTIVE
Hospital course: POD#1: Feeling well, denies complaints    Interval History:     She is doing well this morning. She is tolerating a regular diet without nausea or vomiting. She is voiding spontaneously. She is ambulating. She has passed flatus, and has not a BM. Vaginal bleeding is mild. She denies fever or chills. Abdominal pain is mild and controlled with oral medications.     Objective:     Vital Signs (Most Recent):  Temp: 97.8 °F (36.6 °C) (12/31/19 0934)  Pulse: 86 (12/31/19 0934)  Resp: 18 (12/31/19 0934)  BP: 116/83 (12/31/19 0934)  SpO2: 98 % (12/31/19 0934) Vital Signs (24h Range):  Temp:  [97.8 °F (36.6 °C)-98.1 °F (36.7 °C)] 97.8 °F (36.6 °C)  Pulse:  [] 86  Resp:  [18] 18  SpO2:  [96 %-99 %] 98 %  BP: (116-121)/(76-83) 116/83     Weight: 70 kg (154 lb 5.2 oz)  Body mass index is 25.68 kg/m².      Intake/Output Summary (Last 24 hours) at 12/31/2019 1327  Last data filed at 12/30/2019 1715  Gross per 24 hour   Intake --   Output 1200 ml   Net -1200 ml       Significant Labs:  Lab Results   Component Value Date    GROUPTRH A POS 12/30/2019    HEPBSAG Negative 06/07/2019    STREPBCULT No Group B Streptococcus isolated 12/06/2019     Recent Labs   Lab 12/31/19  0412   HGB 10.0*   HCT 29.9*       I have personallly reviewed all pertinent lab results from the last 24 hours.    Physical Exam:   Constitutional: She is oriented to person, place, and time. She appears well-developed and well-nourished. No distress.    HENT:   Head: Normocephalic and atraumatic.      Cardiovascular: Normal rate, regular rhythm, normal heart sounds and intact distal pulses.     Pulmonary/Chest: Effort normal. No respiratory distress.        Abdominal: Soft. Bowel sounds are normal. She exhibits no mass.   Firm fundus below umbilicus             Musculoskeletal: Normal range of motion and moves all extremeties.       Neurological: She is alert and oriented to person, place, and time.    Skin: Skin is warm. She is not  diaphoretic.    Psychiatric: She has a normal mood and affect. Her behavior is normal. Judgment and thought content normal.

## 2019-12-31 NOTE — HOSPITAL COURSE
POD#1: Feeling well, denies complaints  PPD2: Franny continues to do well, no complaints.  Ready for d/c home

## 2019-12-31 NOTE — LACTATION NOTE
LC did discharge lactation teaching and reviewed the Mother's Breastfeeding Guide. LC answered all questions. Mother has  phone number  for questions after DC.   Mother may refer to the After Visit Summary for lactation instructions. LC asst mother nursing on both sides.JOSE MIGUEL left phone number on mother's white board for mother to call for asst as needed.Told mother what time LC leaves the floor. Mother also told that LC can see when she calls [employer] phone and if LC does not answer, she is busy but will come as soon as possible.

## 2019-12-31 NOTE — LACTATION NOTE
"   12/31/19 1620   Maternal Assessment   Breast Shape Bilateral:;pendulous   Breast Density Bilateral:;filling;soft   Areola Bilateral:;elastic   Nipples Bilateral:;flat;graspable   Left Nipple Symptoms blisters;tender   Right Nipple Symptoms blisters;tender   Maternal Infant Feeding   Maternal Emotional State assist needed   Infant Positioning clutch/football   Signs of Milk Transfer audible swallow;infant jaw motion present   Pain with Feeding yes  ("5" but decreases as infant continues to nurse)   Pain Location nipples, bilateral   Pain Description soreness   Nipple Shape After Feeding, Left pointed   Nipple Shape After Feeding, Right round   Latch Assistance yes   Breast Pumping   Breast Pumping other (see comments)  (hand expressed after nursing to feed extra ebm)   Lactation note:  Infant sleepy at breast; needed stimulation/breast compression to nurse effectively. After nursing both breasts, infant given extra colostrum by spoonfeeding due to high bilirubin. Mom aware to nurse often at least every 3 hours to ensure infant eating 8 or more times a day and then to give extra breast milk after each feeding. May need to pump if infant becomes more sleepy at the breast.  "

## 2020-01-01 VITALS
BODY MASS INDEX: 25.71 KG/M2 | SYSTOLIC BLOOD PRESSURE: 128 MMHG | HEIGHT: 65 IN | WEIGHT: 154.31 LBS | DIASTOLIC BLOOD PRESSURE: 86 MMHG | RESPIRATION RATE: 18 BRPM | HEART RATE: 106 BPM | TEMPERATURE: 98 F | OXYGEN SATURATION: 97 %

## 2020-01-01 PROCEDURE — 99024 POSTOP FOLLOW-UP VISIT: CPT | Mod: ,,, | Performed by: OBSTETRICS & GYNECOLOGY

## 2020-01-01 PROCEDURE — 25000003 PHARM REV CODE 250: Performed by: OBSTETRICS & GYNECOLOGY

## 2020-01-01 PROCEDURE — 99024 PR POST-OP FOLLOW-UP VISIT: ICD-10-PCS | Mod: ,,, | Performed by: OBSTETRICS & GYNECOLOGY

## 2020-01-01 RX ADMIN — PRENATAL VIT W/ FE FUMARATE-FA TAB 27-0.8 MG 1 TABLET: 27-0.8 TAB at 09:01

## 2020-01-01 RX ADMIN — IBUPROFEN 600 MG: 600 TABLET, FILM COATED ORAL at 11:01

## 2020-01-01 RX ADMIN — ACETAMINOPHEN 650 MG: 325 TABLET ORAL at 11:01

## 2020-01-01 RX ADMIN — ACETAMINOPHEN 650 MG: 325 TABLET ORAL at 05:01

## 2020-01-01 RX ADMIN — IBUPROFEN 600 MG: 600 TABLET, FILM COATED ORAL at 05:01

## 2020-01-01 NOTE — SUBJECTIVE & OBJECTIVE
Hospital course: POD#1: Feeling well, denies complaints  PPD2: Franny continues to do well, no complaints.  Ready for d/c home    She is doing well this morning. She is tolerating a regular diet without nausea or vomiting. She is voiding spontaneously. She is ambulating. She has passed flatus, and has a BM. Vaginal bleeding is mild. She denies fever or chills. Abdominal pain is mild and controlled with oral medications. She is breastfeeding.     Objective:     Vital Signs (Most Recent):  Temp: 98.2 °F (36.8 °C) (01/01/20 0907)  Pulse: 106 (01/01/20 0907)  Resp: 18 (01/01/20 0907)  BP: 128/86 (01/01/20 0907)  SpO2: 97 % (01/01/20 0907) Vital Signs (24h Range):  Temp:  [97.6 °F (36.4 °C)-98.2 °F (36.8 °C)] 98.2 °F (36.8 °C)  Pulse:  [] 106  Resp:  [18] 18  SpO2:  [96 %-99 %] 97 %  BP: (125-134)/(74-86) 128/86     Weight: 70 kg (154 lb 5.2 oz)  Body mass index is 25.68 kg/m².    No intake or output data in the 24 hours ending 01/01/20 1052    Significant Labs:  Lab Results   Component Value Date    GROUPTRH A POS 12/30/2019    HEPBSAG Negative 06/07/2019    STREPBCULT No Group B Streptococcus isolated 12/06/2019     Recent Labs   Lab 12/31/19  0412   HGB 10.0*   HCT 29.9*       I have personallly reviewed all pertinent lab results from the last 24 hours.    Physical Exam:   Constitutional: She is oriented to person, place, and time. She appears well-developed and well-nourished. No distress.    HENT:   Head: Normocephalic and atraumatic.       Pulmonary/Chest: Effort normal. No respiratory distress.        Abdominal: Soft. She exhibits no distension. There is no tenderness. There is no rebound and no guarding.   Fundus firm, NT, below umbilicus                   Neurological: She is alert and oriented to person, place, and time.    Skin: Skin is warm and dry. She is not diaphoretic.    Psychiatric: She has a normal mood and affect.

## 2020-01-01 NOTE — PROGRESS NOTES
Mother called RN to room to request formula at this time. Mom would like to supplement with formula tonight and start pumping. Mom plans to just pump and bottle feed baby when she goes home and would like to pump and supplement with formula for the remainder or her hospital stay. RN educated mother on risks of formula feeding and mother verbalizes understanding.

## 2020-01-01 NOTE — PROGRESS NOTES
Brille24hony pump, tubing, collections containers and labels brought to bedside.  Discussed proper pump setting of initiation phase.  Instructed on proper usage of pump and to adjust suction according to maximum comfort level.  Verified appropriate flange fit.  Educated on the frequency and duration of pumping in order to promote and maintain a full milk supply.  Hands on pumping technique reviewed.  Encouraged hand expression after pumping.  Instructed on cleaning of breast pump parts.  Written instructions also given.  Pt verbalized understanding and appropriate recall for proper milk handling, collection, labeling, storage and transportation.

## 2020-01-01 NOTE — DISCHARGE SUMMARY
[facility]  Obstetrics  Discharge Summary      Patient Name: Niesha Pierre  MRN: [id number]  Admission Date: 2019  Hospital Length of Stay: 2 days  Discharge Date and Time:  2020 10:55 AM  Attending Physician: Tahmina Alarcon MD   Discharging Provider: Sushila Guerrero DO   Primary Care Provider: Jaquelin Morales MD    HPI: 24 y.o.  who underwent uncomplicated  on         * No surgery found *     Hospital Course:   POD#1: Feeling well, denies complaints  PPD2: Franny continues to do well, no complaints.  Ready for d/c home         Final Active Diagnoses:    Diagnosis Date Noted POA    PRINCIPAL PROBLEM:  Vaginal delivery [O80] 2019 Not Applicable      Problems Resolved During this Admission:    Diagnosis Date Noted Date Resolved POA    Pregnancy [Z34.90] 2019 Not Applicable             Feeding Method: breast    Immunizations     Date Immunization Status Dose Route/Site Given by    19 0718 MMR Deferred 0.5 mL Subcutaneous/Left deltoid Tahmina Ascencio RN    19 0718 Tdap Deferred 0.5 mL Intramuscular/Left deltoid Tahmina Ascencio RN          Delivery:    Episiotomy: None   Lacerations: 2nd   Repair suture:     Repair # of packets: 2   Blood loss (ml): 300     Birth information:  YOB: 2019   Time of birth: 10:33 AM   Sex: female   Delivery type: Vaginal, Spontaneous   Gestational Age: 39w1d    Delivery Clinician:      Other providers:       Additional  information:  Forceps:    Vacuum:    Breech:    Observed anomalies      Living?:           APGARS  One minute Five minutes Ten minutes   Skin color:         Heart rate:         Grimace:         Muscle tone:         Breathing:         Totals: 9  9        Placenta: Delivered:       appearance    Pending Diagnostic Studies:     None          Discharged Condition: good    Disposition: Home or Self Care    Follow Up:  Follow-up Information     Tahmina Alarcon  MD In 6 weeks.    Specialty:  Obstetrics and Gynecology  Why:  Post partum exam  Contact information:  [address]  [address]  [facility] [address]  [phone]                 Patient Instructions:      Call MD for:  temperature >100.4     Call MD for:  persistent nausea and vomiting or diarrhea     Call MD for:  severe uncontrolled pain     Call MD for:  redness, tenderness, or signs of infection (pain, swelling, redness, odor or green/yellow discharge around incision site)     No dressing needed     Pelvic Rest     Shower on day dressing removed (No bath)     Medications:  Current Discharge Medication List      START taking these medications    Details   docusate sodium (COLACE) 100 MG capsule Take 2 capsules (200 mg total) by mouth 2 (two) times daily.  Qty: 60 capsule, Refills: 2      ibuprofen (ADVIL,MOTRIN) 600 MG tablet Take 1 tablet (600 mg total) by mouth every 6 (six) hours as needed (cramping).  Qty: 60 tablet, Refills: 2         CONTINUE these medications which have CHANGED    Details   enoxaparin (LOVENOX) 40 mg/0.4 mL Syrg Inject 0.4 mLs (40 mg total) into the skin once daily.  Qty: 12 mL, Refills: 1         CONTINUE these medications which have NOT CHANGED    Details   desonide (DESOWEN) 0.05 % lotion Apply topically 2 (two) times daily. No longer than 2 weeks  Qty: 1 Bottle, Refills: 1    Associated Diagnoses: Vulvovaginitis      FINACEA 15 % gel APPLY TO FACE ONCE OR TWICE DAILY  Refills: 3      ondansetron (ZOFRAN) 4 MG tablet Take 1 tablet (4 mg total) by mouth every 6 (six) hours as needed for Nausea.  Qty: 20 tablet, Refills: 0      prenatal 25/iron fum/folic/dha (PRENATAL-1 ORAL) Take by mouth.      promethazine (PHENERGAN) 25 MG tablet Take 0.5 tablets (12.5 mg total) by mouth every 6 (six) hours as needed for Nausea.  Qty: 20 tablet, Refills: 0      pyridoxine HCl, vitamin B6, (VITAMIN B-6 ORAL) Take by mouth.             Ssuhila Guerrero, DO  Obstetrics  [facility]

## 2020-01-01 NOTE — LACTATION NOTE
"   01/01/20 0915   Maternal Infant Feeding   Maternal Emotional State anxious   Latch Assistance no  (pt declines latching baby)   Equipment Type   Breast Pump Type double electric, hospital grade   Breast Pump Flange Type hard   Breast Pump Flange Size 24 mm   Breast Pumping   Breast Pumping Interventions post-feed pumping encouraged;frequent pumping encouraged;early pumping promoted   Breast Pumping other (see comments)  (encouraged to pump 8 or more times in 24hrs.)   Lactation Referrals   Lactation Referrals pediatric care provider;outpatient lactation program;other (see comments)  (lactation warmline)   LC to room. Lactation discharge education reviewed yesterday by prior LC. Patient appears anxious and tearful. Pt states latching baby is too stressful and wants to pump and bottle feed. Emotional support provided. Reviewed pumping and bottle feeding education. Baby crying, pt states she pumped at 0800 and gave baby a few mLs EBM, reinforced feeding on cue until content. LC assisted FOB with paced bottle feeding. Reviewed pumping 8 or more times in 24hrs, every time baby eats, using "hands on" technique, milk storage/handling/labeling. At this time, pt does not want to continue putting baby to breast, she feels pumping/ bottle feeding is a better fit, praise/ support provided. Pt has Spectra pump for home use. Encouraged hand expression after pumping and STS.  number on board, pt has lactation warmline for support after discharge.   "

## 2020-01-01 NOTE — PROGRESS NOTES
[facility]  Obstetrics  Postpartum Progress Note    Patient Name: Niesha Pierre  MRN: [id number]  Admission Date: 12/30/2019  Hospital Length of Stay: 2 days  Attending Physician: Tahmina Alarcon MD  Primary Care Provider: Jaquelin Morales MD    Subjective:     Principal Problem:Vaginal delivery    Hospital course: POD#1: Feeling well, denies complaints  PPD2: Franny continues to do well, no complaints.  Ready for d/c home    She is doing well this morning. She is tolerating a regular diet without nausea or vomiting. She is voiding spontaneously. She is ambulating. She has passed flatus, and has a BM. Vaginal bleeding is mild. She denies fever or chills. Abdominal pain is mild and controlled with oral medications. She is breastfeeding.     Objective:     Vital Signs (Most Recent):  Temp: 98.2 °F (36.8 °C) (01/01/20 0907)  Pulse: 106 (01/01/20 0907)  Resp: 18 (01/01/20 0907)  BP: 128/86 (01/01/20 0907)  SpO2: 97 % (01/01/20 0907) Vital Signs (24h Range):  Temp:  [97.6 °F (36.4 °C)-98.2 °F (36.8 °C)] 98.2 °F (36.8 °C)  Pulse:  [] 106  Resp:  [18] 18  SpO2:  [96 %-99 %] 97 %  BP: (125-134)/(74-86) 128/86     Weight: 70 kg (154 lb 5.2 oz)  Body mass index is 25.68 kg/m².    No intake or output data in the 24 hours ending 01/01/20 1052    Significant Labs:  Lab Results   Component Value Date    GROUPTRH A POS 12/30/2019    HEPBSAG Negative 06/07/2019    STREPBCULT No Group B Streptococcus isolated 12/06/2019     Recent Labs   Lab 12/31/19  0412   HGB 10.0*   HCT 29.9*       I have personallly reviewed all pertinent lab results from the last 24 hours.    Physical Exam:   Constitutional: She is oriented to person, place, and time. She appears well-developed and well-nourished. No distress.    HENT:   Head: Normocephalic and atraumatic.       Pulmonary/Chest: Effort normal. No respiratory distress.        Abdominal: Soft. She exhibits no distension. There is no tenderness. There is no  rebound and no guarding.   Fundus firm, NT, below umbilicus                   Neurological: She is alert and oriented to person, place, and time.    Skin: Skin is warm and dry. She is not diaphoretic.    Psychiatric: She has a normal mood and affect.       Assessment/Plan:     24 y.o. female  for:    * Vaginal delivery  Routine post partum care  Will Rx Lovenox for PP (hx DVT)  Ready for d/c home        Disposition: As patient meets milestones, will plan to discharge today.    Sushila Guerrero, DO  Obstetrics  [facility]

## 2020-01-02 ENCOUNTER — PATIENT MESSAGE (OUTPATIENT)
Dept: OBSTETRICS AND GYNECOLOGY | Facility: CLINIC | Age: 25
End: 2020-01-02

## 2020-01-02 ENCOUNTER — POSTPARTUM VISIT (OUTPATIENT)
Dept: OBSTETRICS AND GYNECOLOGY | Facility: CLINIC | Age: 25
End: 2020-01-02
Attending: STUDENT IN AN ORGANIZED HEALTH CARE EDUCATION/TRAINING PROGRAM
Payer: COMMERCIAL

## 2020-01-02 VITALS
WEIGHT: 144.63 LBS | DIASTOLIC BLOOD PRESSURE: 72 MMHG | BODY MASS INDEX: 24.07 KG/M2 | SYSTOLIC BLOOD PRESSURE: 102 MMHG

## 2020-01-02 PROCEDURE — 0503F PR POSTPARTUM CARE VISIT: ICD-10-PCS | Mod: S$GLB,,, | Performed by: STUDENT IN AN ORGANIZED HEALTH CARE EDUCATION/TRAINING PROGRAM

## 2020-01-02 PROCEDURE — 99999 PR PBB SHADOW E&M-EST. PATIENT-LVL III: CPT | Mod: PBBFAC,,, | Performed by: STUDENT IN AN ORGANIZED HEALTH CARE EDUCATION/TRAINING PROGRAM

## 2020-01-02 PROCEDURE — 0503F POSTPARTUM CARE VISIT: CPT | Mod: S$GLB,,, | Performed by: STUDENT IN AN ORGANIZED HEALTH CARE EDUCATION/TRAINING PROGRAM

## 2020-01-02 PROCEDURE — 99999 PR PBB SHADOW E&M-EST. PATIENT-LVL III: ICD-10-PCS | Mod: PBBFAC,,, | Performed by: STUDENT IN AN ORGANIZED HEALTH CARE EDUCATION/TRAINING PROGRAM

## 2020-01-02 RX ORDER — SERTRALINE HYDROCHLORIDE 50 MG/1
50 TABLET, FILM COATED ORAL DAILY
Qty: 30 TABLET | Refills: 11 | Status: SHIPPED | OUTPATIENT
Start: 2020-01-02 | End: 2020-01-31 | Stop reason: SDUPTHER

## 2020-01-02 NOTE — TELEPHONE ENCOUNTER
"Spoke with the patient. Describes as "not feeling her best" after having the baby.Offered appointment to come in for evaluation. Patient will come in for 3:30pm 01/02/2019  "

## 2020-01-03 ENCOUNTER — TELEPHONE (OUTPATIENT)
Dept: OBSTETRICS AND GYNECOLOGY | Facility: CLINIC | Age: 25
End: 2020-01-03

## 2020-01-03 ENCOUNTER — ANESTHESIA (OUTPATIENT)
Dept: EMERGENCY MEDICINE | Facility: OTHER | Age: 25
End: 2020-01-03
Payer: COMMERCIAL

## 2020-01-03 ENCOUNTER — ANESTHESIA EVENT (OUTPATIENT)
Dept: EMERGENCY MEDICINE | Facility: OTHER | Age: 25
End: 2020-01-03
Payer: COMMERCIAL

## 2020-01-03 ENCOUNTER — HOSPITAL ENCOUNTER (EMERGENCY)
Facility: OTHER | Age: 25
Discharge: HOME | End: 2020-01-03
Attending: OBSTETRICS & GYNECOLOGY
Payer: COMMERCIAL

## 2020-01-03 VITALS
DIASTOLIC BLOOD PRESSURE: 92 MMHG | SYSTOLIC BLOOD PRESSURE: 138 MMHG | RESPIRATION RATE: 18 BRPM | HEART RATE: 78 BPM | OXYGEN SATURATION: 97 % | TEMPERATURE: 98 F

## 2020-01-03 DIAGNOSIS — Z86.718 HISTORY OF DVT (DEEP VEIN THROMBOSIS): Primary | ICD-10-CM

## 2020-01-03 DIAGNOSIS — G97.1 POST-DURAL PUNCTURE HEADACHE: ICD-10-CM

## 2020-01-03 LAB
APTT BLDCRRT: 26.4 SEC (ref 21–32)
BACTERIA #/AREA URNS HPF: ABNORMAL /HPF
BASOPHILS # BLD AUTO: 0.08 K/UL (ref 0–0.2)
BASOPHILS NFR BLD: 0.8 % (ref 0–1.9)
BILIRUB SERPL-MCNC: NEGATIVE MG/DL
BILIRUB UR QL STRIP: NEGATIVE
BLOOD URINE, POC: NORMAL
CLARITY UR: CLEAR
COLOR UR: YELLOW
COLOR, POC UA: YELLOW
DIFFERENTIAL METHOD: ABNORMAL
EOSINOPHIL # BLD AUTO: 0.3 K/UL (ref 0–0.5)
EOSINOPHIL NFR BLD: 3.2 % (ref 0–8)
ERYTHROCYTE [DISTWIDTH] IN BLOOD BY AUTOMATED COUNT: 12.6 % (ref 11.5–14.5)
GLUCOSE UR QL STRIP: NEGATIVE
GLUCOSE UR QL STRIP: NORMAL
HCT VFR BLD AUTO: 36 % (ref 37–48.5)
HGB BLD-MCNC: 11.9 G/DL (ref 12–16)
HGB UR QL STRIP: ABNORMAL
IMM GRANULOCYTES # BLD AUTO: 0.05 K/UL (ref 0–0.04)
IMM GRANULOCYTES NFR BLD AUTO: 0.5 % (ref 0–0.5)
INR PPP: 0.9 (ref 0.8–1.2)
KETONES UR QL STRIP: ABNORMAL
KETONES UR QL STRIP: NORMAL
LEUKOCYTE ESTERASE UR QL STRIP: ABNORMAL
LEUKOCYTE ESTERASE URINE, POC: NORMAL
LYMPHOCYTES # BLD AUTO: 1.6 K/UL (ref 1–4.8)
LYMPHOCYTES NFR BLD: 15.1 % (ref 18–48)
MCH RBC QN AUTO: 28.4 PG (ref 27–31)
MCHC RBC AUTO-ENTMCNC: 33.1 G/DL (ref 32–36)
MCV RBC AUTO: 86 FL (ref 82–98)
MICROSCOPIC COMMENT: ABNORMAL
MONOCYTES # BLD AUTO: 0.6 K/UL (ref 0.3–1)
MONOCYTES NFR BLD: 6 % (ref 4–15)
NEUTROPHILS # BLD AUTO: 7.9 K/UL (ref 1.8–7.7)
NEUTROPHILS NFR BLD: 74.4 % (ref 38–73)
NITRITE UR QL STRIP: NEGATIVE
NITRITE, POC UA: NEGATIVE
NRBC BLD-RTO: 0 /100 WBC
PH UR STRIP: 7 [PH] (ref 5–8)
PH, POC UA: 6
PLATELET # BLD AUTO: 279 K/UL (ref 150–350)
PMV BLD AUTO: 9.8 FL (ref 9.2–12.9)
PROT UR QL STRIP: NEGATIVE
PROTEIN, POC: NEGATIVE
PROTHROMBIN TIME: 10.2 SEC (ref 9–12.5)
RBC # BLD AUTO: 4.19 M/UL (ref 4–5.4)
RBC #/AREA URNS HPF: 25 /HPF (ref 0–4)
SP GR UR STRIP: <=1.005 (ref 1–1.03)
SPECIFIC GRAVITY, POC UA: 1
SQUAMOUS #/AREA URNS HPF: 14 /HPF
URN SPEC COLLECT METH UR: ABNORMAL
UROBILINOGEN UR STRIP-ACNC: NEGATIVE EU/DL
UROBILINOGEN, POC UA: NORMAL
WBC # BLD AUTO: 10.63 K/UL (ref 3.9–12.7)
WBC #/AREA URNS HPF: 20 /HPF (ref 0–5)
WBC CLUMPS URNS QL MICRO: ABNORMAL
YEAST URNS QL MICRO: ABNORMAL

## 2020-01-03 PROCEDURE — 63600175 PHARM REV CODE 636 W HCPCS: Performed by: STUDENT IN AN ORGANIZED HEALTH CARE EDUCATION/TRAINING PROGRAM

## 2020-01-03 PROCEDURE — 25000003 PHARM REV CODE 250: Performed by: STUDENT IN AN ORGANIZED HEALTH CARE EDUCATION/TRAINING PROGRAM

## 2020-01-03 PROCEDURE — 81000 URINALYSIS NONAUTO W/SCOPE: CPT

## 2020-01-03 PROCEDURE — 85730 THROMBOPLASTIN TIME PARTIAL: CPT

## 2020-01-03 PROCEDURE — 99284 PR EMERGENCY DEPT VISIT,LEVEL IV: ICD-10-PCS | Mod: 24,,, | Performed by: OBSTETRICS & GYNECOLOGY

## 2020-01-03 PROCEDURE — 96361 HYDRATE IV INFUSION ADD-ON: CPT

## 2020-01-03 PROCEDURE — 99284 EMERGENCY DEPT VISIT MOD MDM: CPT | Mod: 24,,, | Performed by: OBSTETRICS & GYNECOLOGY

## 2020-01-03 PROCEDURE — 81002 URINALYSIS NONAUTO W/O SCOPE: CPT

## 2020-01-03 PROCEDURE — 99284 EMERGENCY DEPT VISIT MOD MDM: CPT | Mod: 25

## 2020-01-03 PROCEDURE — C1751 CATH, INF, PER/CENT/MIDLINE: HCPCS | Performed by: ANESTHESIOLOGY

## 2020-01-03 PROCEDURE — 85025 COMPLETE CBC W/AUTO DIFF WBC: CPT

## 2020-01-03 PROCEDURE — 96360 HYDRATION IV INFUSION INIT: CPT

## 2020-01-03 PROCEDURE — 85610 PROTHROMBIN TIME: CPT

## 2020-01-03 RX ORDER — BUTALBITAL, ACETAMINOPHEN AND CAFFEINE 50; 325; 40 MG/1; MG/1; MG/1
1 TABLET ORAL EVERY 4 HOURS PRN
Qty: 16 TABLET | Refills: 0 | Status: SHIPPED | OUTPATIENT
Start: 2020-01-03 | End: 2020-02-02

## 2020-01-03 RX ORDER — IBUPROFEN 600 MG/1
600 TABLET ORAL ONCE
Status: COMPLETED | OUTPATIENT
Start: 2020-01-03 | End: 2020-01-03

## 2020-01-03 RX ORDER — CEPHALEXIN 500 MG/1
500 CAPSULE ORAL EVERY 12 HOURS
Qty: 14 CAPSULE | Refills: 0 | Status: SHIPPED | OUTPATIENT
Start: 2020-01-03 | End: 2020-01-10

## 2020-01-03 RX ADMIN — SODIUM CHLORIDE, SODIUM LACTATE, POTASSIUM CHLORIDE, AND CALCIUM CHLORIDE 500 ML: .6; .31; .03; .02 INJECTION, SOLUTION INTRAVENOUS at 08:01

## 2020-01-03 RX ADMIN — IBUPROFEN 600 MG: 600 TABLET, FILM COATED ORAL at 08:01

## 2020-01-03 RX ADMIN — SODIUM CHLORIDE, SODIUM LACTATE, POTASSIUM CHLORIDE, AND CALCIUM CHLORIDE 1000 ML: .6; .31; .03; .02 INJECTION, SOLUTION INTRAVENOUS at 06:01

## 2020-01-03 NOTE — TELEPHONE ENCOUNTER
Spoke with the patient. Instruct her to take Ibuprofen/percocet and if no relief to go to OB/ED per Dr. Alarcon.  She states she has been taking the Ibuprofen and also Tylenol every 6 hours, since the pain has started  and there has been no relief. Instruct the patient to head to OB/ED and  Will be notified.

## 2020-01-03 NOTE — ED PROVIDER NOTES
"Encounter Date: 1/3/2020       History     Chief Complaint   Patient presents with    Headache     Post-Partum NVD 19     HPI   Niesha Pierre is a 24 y.o. [id number] who is post partum day #4 s/p  who presents with headache. Her pregnancy was complicated by L popliteal DVT (diagnosed 19) for which she took lovenox 40 SC daily. She reports that she initially developed th headache during her hospital admission however attributed to the stressors of having a . She was discharged 2 days ago and continued to experience the headache. She states that it is a pounding sensation primarily over her frontal lobe that she rates as a 1-2/10 in the supine position versus 10/10 while standing. She reports no vision changes, CP, RUQ pain or edema. She does have some shortness of breath however she attributes this to deconditioning and mild baby blues which has been making her feel "down." Otherwise she states that she has been tolerating PO intake without difficulty, no nausea or vomiting. Does report decreased appetite due to baby blues. No abdominal pain. Lochia has been mild. No difficulty with urination or defecation. Has been attempting to breastfeed however this has been difficult for her due to above symptoms and thus has been breast and bottle feeding. She has attempted alternating tylenol and ibuprofen for her HA with little relief. Last lovenox dose at 1800 yesterday     Review of patient's allergies indicates:  No Known Allergies  No past medical history on file.  No past surgical history on file.  Family History   Problem Relation Age of Onset    Hypertension Mother     Hyperlipidemia Mother     Hypertension Father     Hyperlipidemia Father     Hypertension Maternal Uncle     Hypertension Paternal Uncle     Stroke Maternal Grandfather     Diabetes Paternal Grandmother     Cancer Paternal Grandfather 80        Pancreatic     Social History     Tobacco Use    Smoking status: " Former Smoker     Last attempt to quit: 2013     Years since quittin.7    Smokeless tobacco: Never Used   Substance Use Topics    Alcohol use: Not Currently     Alcohol/week: 18.0 standard drinks     Types: 18 Cans of beer per week    Drug use: No     Review of Systems   Constitutional: Negative for chills, diaphoresis and fever.   HENT: Negative for congestion, postnasal drip, rhinorrhea, sneezing and sore throat.    Eyes: Negative for photophobia.   Respiratory: Negative for cough, chest tightness and shortness of breath.    Cardiovascular: Negative for chest pain, palpitations and leg swelling.   Gastrointestinal: Negative for abdominal pain, constipation, diarrhea, nausea and vomiting.   Genitourinary: Negative for dysuria, frequency, hematuria, pelvic pain, vaginal bleeding and vaginal discharge.   Musculoskeletal: Negative for arthralgias, back pain and joint swelling.   Neurological: Positive for headaches. Negative for syncope and light-headedness.   Psychiatric/Behavioral: Negative for self-injury, sleep disturbance and suicidal ideas. The patient is not nervous/anxious.        Physical Exam     Initial Vitals   BP Pulse Resp Temp SpO2   20 1825 20 1752 20 1752 20 1752 20 1752   (!) 138/92 77 18 98.3 °F (36.8 °C) 99 %      MAP       --                  Physical Exam    Vitals reviewed.  Constitutional: Vital signs are normal. She appears well-developed and well-nourished. She is not diaphoretic. No distress.   Cardiovascular: Normal rate, regular rhythm and normal heart sounds.   Pulmonary/Chest: Breath sounds normal. No respiratory distress. She has no wheezes.   Abdominal: Soft. There is no tenderness. There is no rebound and no guarding.   Gravid abdomen, size = dates   Neurological: She is alert and oriented to person, place, and time. She has normal reflexes. No cranial nerve deficit.   Skin: Skin is warm and dry. Capillary refill takes less than 2 seconds.    Psychiatric: She has a normal mood and affect. Her behavior is normal. Judgment and thought content normal.         ED Course     Labs Reviewed   URINALYSIS, REFLEX TO URINE CULTURE   URINALYSIS   POCT URINALYSIS, DIPSTICK OR TABLET REAGENT, AUTOMATED, WITH MICROSCOP          Imaging Results    None          Medical Decision Making:   History:   Old Records Summarized: records from clinic visits and records from previous admission(s).  ED Management:  Patient who is PPD#4 s/p  who presents with headache  VSS, not in acute distress  Reports headache is improved with supine position worse with prone; has attempted tylenol and ibuprofen without any relief   Anesthesia consulted > 1.5 L fluid bolus given over admission in [facility]  Blood patch placed by anesthesia  Udip: 3+ leukocytes. Will sent for UA > will prescribe Keflex on discharge   Will discharge patient home in stable condition - will prescribe TEDs and additional fioricet for symptomatic relief. All questions answered, patient verbalized understanding               Attending Attestation:   Physician Attestation Statement for Resident:  As the supervising MD   Physician Attestation Statement: I have personally seen and examined this patient.   I agree with the above history. -:   As the supervising MD I agree with the above PE.    As the supervising MD I agree with the above treatment, course, plan, and disposition.   -: Patient evaluated and found to be stable, agree with resident's assessment of spinal headache now relieved by blood patch placed by anesthesiologists and plan to discharge to home with precautions to return to OB ED for worsening symptoms or s/s pre-eclampsia.  I have reviewed and agree with the residents interpretation of the following: lab data.  I have reviewed the following: old records at this facility.                                  Clinical Impression:       ICD-10-CM ICD-9-CM   1. History of DVT (deep vein thrombosis) Z86.718 V12.51    2. Post-dural puncture headache G97.1 349.0                             Nancy Jordan MD  Resident  01/04/20 0656       Hodan Gaviria MD  01/04/20 1805

## 2020-01-03 NOTE — TELEPHONE ENCOUNTER
Dr. Alarcon-- delivered on Monday and states that she is experiencing pain in her head, neck, and back and would like to know what she can take. Pt's # [phone]

## 2020-01-04 NOTE — ANESTHESIA PROCEDURE NOTES
Epidural Blood Patch    Patient location during procedure: ED   Reason for epidural blood patch: post dural puncture headache  Start time: 1/3/2020 8:10 PM  Timeout: 1/3/2020 8:05 PM  End time: 1/3/2020 8:20 PM     Staffing  Authorizing Provider: Jessica Austin MD  Performing Provider: Jessica Austin MD    Preanesthetic Checklist  Completed: patient identified, site marked, surgical consent, pre-op evaluation, timeout performed, IV checked, risks and benefits discussed, monitors and equipment checked, anesthesia consent given, hand hygiene performed and patient being monitored  Preparation  Patient position: sitting  Prep: ChloraPrep  Patient monitoring: Pulse Ox and Blood Pressure  Epidural Blood Patch  Skin Anesthetic: lidocaine 1%  Skin Wheal: 3 mL  Approach: midline  Interspace: L3-4  Blood was aseptically collected.  Collection Location: venous    Needle and Epidural Catheter  Needle type: Tuohy   Needle gauge: 17  Needle length: 3.5 inches  Needle insertion depth: 4 cm  Additional Documentation: incremental injection and no significant complaints from patient  Needle localization: anatomical landmarks  Assessment  Ease of injection: easy  Patient's tolerance of the procedure: comfortable throughoutAmount of blood injected: 15mL.  Additional Notes  The patient's headache improved from a 10/10 pain with associated nausea to a 3/10 without any nausea. I stopped at 15ml injection of blood due to hip pressure/pain. Instructed to lay flat for 2 hours, we will re-evaluate before she is discharged by the OB team

## 2020-01-04 NOTE — ED NOTES
Pt reported to [facility] with complaints of headache. She is postpartum delivered on 12/30/2019 and was discharged on 01/01/2020. Denies blurry vision and dizziness. Stated she feels better when lying down and headache worsens upon sitting up. Pt stated she received an epidural during labor.

## 2020-01-04 NOTE — ANESTHESIA POSTPROCEDURE EVALUATION
Anesthesia Post Evaluation    Patient: Niesha Pierre    Procedure(s) Performed: * No procedures listed *    Final Anesthesia Type: epidural    Patient location during evaluation: labor & delivery  Patient participation: Yes- Able to Participate  Level of consciousness: awake and alert and oriented  Post-procedure vital signs: reviewed and stable  Pain management: adequate  Airway patency: patent    PONV status at discharge: No PONV  Anesthetic complications: no      Cardiovascular status: blood pressure returned to baseline and hemodynamically stable  Respiratory status: unassisted, spontaneous ventilation and room air  Hydration status: euvolemic  Follow-up not needed.          Vitals Value Taken Time   /92 1/3/2020  6:25 PM   Temp 36.7 °C (98.1 °F) 1/3/2020  6:25 PM   Pulse 78 1/3/2020 10:05 PM   Resp 18 1/3/2020  5:52 PM   SpO2 97 % 1/3/2020 10:05 PM         No case tracking events are documented in the log.      Pain/Joel Score: Pain Rating Prior to Med Admin: 3 (1/3/2020  8:33 PM)  Pain Rating Post Med Admin: 3 (1/3/2020 10:00 PM)

## 2020-01-04 NOTE — ANESTHESIA PREPROCEDURE EVALUATION
2020  Niesha Pierre is a 24 y.o., female   Pre-operative evaluation for * No surgery found *    Niesha Pierre is a 24 y.o. female FOUR DAYS POSTPARTUM, PRESENTING WITH PDPH. ON LOVENOX FOR DVT FOUND DURING PREGNANCY. 40MG DAILY DOSE, LAST AT 1800 2020. PATIENT DESIRES EPIDURAL BLOOD PATCH AND SHE HAS SUCCESSFULLY BEEN OFF LOVENOX LONG ENOUGH TO GET THE PROCEDURE DONE. AWAITING LABS PRIOR TO PROCEEDING  OB History        1    Para   1    Term   1            AB        Living   1       SAB        TAB        Ectopic        Multiple   0    Live Births   1                   Patient Active Problem List   Diagnosis    Vitamin D insufficiency    Acute deep vein thrombosis (DVT) of popliteal vein of left lower extremity    16 weeks gestation of pregnancy    38 weeks gestation of pregnancy    Vaginal delivery    Postpartum care and examination       Review of patient's allergies indicates:  No Known Allergies    No current facility-administered medications on file prior to encounter.      Current Outpatient Medications on File Prior to Encounter   Medication Sig Dispense Refill    desonide (DESOWEN) 0.05 % lotion Apply topically 2 (two) times daily. No longer than 2 weeks 1 Bottle 1    docusate sodium (COLACE) 100 MG capsule Take 2 capsules (200 mg total) by mouth 2 (two) times daily. 60 capsule 2    enoxaparin (LOVENOX) 40 mg/0.4 mL Syrg Inject 0.4 mLs (40 mg total) into the skin once daily. 12 mL 1    FINACEA 15 % gel APPLY TO FACE ONCE OR TWICE DAILY  3    ibuprofen (ADVIL,MOTRIN) 600 MG tablet Take 1 tablet (600 mg total) by mouth every 6 (six) hours as needed (cramping). 60 tablet 2    ondansetron (ZOFRAN) 4 MG tablet Take 1 tablet (4 mg total) by mouth every 6 (six) hours as needed for Nausea. 20 tablet 0    prenatal 25/iron fum/folic/dha (PRENATAL-1 ORAL)  Take by mouth.      promethazine (PHENERGAN) 25 MG tablet Take 0.5 tablets (12.5 mg total) by mouth every 6 (six) hours as needed for Nausea. 20 tablet 0    pyridoxine HCl, vitamin B6, (VITAMIN B-6 ORAL) Take by mouth.      sertraline (ZOLOFT) 50 MG tablet Take 1 tablet (50 mg total) by mouth once daily. 30 tablet 11       History reviewed. No pertinent surgical history.        Anesthesia Evaluation    I have reviewed the Patient Summary Reports.     I have reviewed the Medications.     Review of Systems  Anesthesia Hx:  No problems with previous Anesthesia Denies Hx of Anesthetic complications  History of prior surgery of interest to airway management or planning: Denies Family Hx of Anesthesia complications.   Denies Personal Hx of Anesthesia complications.   Social:  Non-Smoker, Alcohol Use    Hematology/Oncology:  Hematology Normal   Oncology Normal     EENT/Dental:EENT/Dental Normal   Cardiovascular:  Cardiovascular Normal Exercise tolerance: good     Pulmonary:  Pulmonary Normal    Renal/:  Renal/ Normal     Hepatic/GI:  Hepatic/GI Normal    Neurological:   Headaches    Endocrine:  Endocrine Normal    Psych:  Psychiatric Normal           Physical Exam  General:  Well nourished    Airway/Jaw/Neck:  Airway Findings: Mouth Opening: Normal Tongue: Normal  General Airway Assessment: Adult, Average  Mallampati: II  TM Distance: Normal, at least 6 cm  Jaw/Neck Findings:  Neck ROM: Normal ROM      Dental:  Dental Findings: In tact   Chest/Lungs:  Chest/Lungs Findings: Normal Respiratory Rate, Clear to auscultation     Heart/Vascular:  Heart Findings: Rate: Normal  Rhythm: Regular Rhythm        Mental Status:  Mental Status Findings:  Alert and Oriented, Cooperative         Anesthesia Plan  Type of Anesthesia, risks & benefits discussed:  Anesthesia Type:  epidural  Patient's Preference:   Intra-op Monitoring Plan: standard ASA monitors  Intra-op Monitoring Plan Comments:   Post Op Pain Control Plan: IV/PO  Opioids PRN and multimodal analgesia  Post Op Pain Control Plan Comments:   Induction:    Beta Blocker:  Patient is not currently on a Beta-Blocker (No further documentation required).       Informed Consent: Patient understands risks and agrees with Anesthesia plan.  Questions answered. Anesthesia consent signed with patient.  ASA Score: 2     Day of Surgery Review of History & Physical:    H&P update referred to the provider.     Anesthesia Plan Notes: DETAILED EXPLANATION OF RISKS AND BENEFITS, PATIENT UNDERSTANDS AND WISHES TO PROCEED        Ready For Surgery From Anesthesia Perspective.

## 2020-01-09 ENCOUNTER — PATIENT OUTREACH (OUTPATIENT)
Dept: ADMINISTRATIVE | Facility: OTHER | Age: 25
End: 2020-01-09

## 2020-01-09 NOTE — PROGRESS NOTES
Chart reviewed.   Immunizations: Triggered Imm Registry     Orders placed: n/a  Upcoming appts: n/a

## 2020-01-10 ENCOUNTER — POSTPARTUM VISIT (OUTPATIENT)
Dept: OBSTETRICS AND GYNECOLOGY | Facility: CLINIC | Age: 25
End: 2020-01-10
Attending: STUDENT IN AN ORGANIZED HEALTH CARE EDUCATION/TRAINING PROGRAM
Payer: COMMERCIAL

## 2020-01-10 VITALS
WEIGHT: 140.44 LBS | BODY MASS INDEX: 23.4 KG/M2 | HEIGHT: 65 IN | SYSTOLIC BLOOD PRESSURE: 110 MMHG | DIASTOLIC BLOOD PRESSURE: 72 MMHG

## 2020-01-10 PROCEDURE — 0503F POSTPARTUM CARE VISIT: CPT | Mod: S$GLB,,, | Performed by: STUDENT IN AN ORGANIZED HEALTH CARE EDUCATION/TRAINING PROGRAM

## 2020-01-10 PROCEDURE — 99999 PR PBB SHADOW E&M-EST. PATIENT-LVL III: ICD-10-PCS | Mod: PBBFAC,,, | Performed by: STUDENT IN AN ORGANIZED HEALTH CARE EDUCATION/TRAINING PROGRAM

## 2020-01-10 PROCEDURE — 99999 PR PBB SHADOW E&M-EST. PATIENT-LVL III: CPT | Mod: PBBFAC,,, | Performed by: STUDENT IN AN ORGANIZED HEALTH CARE EDUCATION/TRAINING PROGRAM

## 2020-01-10 PROCEDURE — 0503F PR POSTPARTUM CARE VISIT: ICD-10-PCS | Mod: S$GLB,,, | Performed by: STUDENT IN AN ORGANIZED HEALTH CARE EDUCATION/TRAINING PROGRAM

## 2020-01-10 NOTE — PROGRESS NOTES
Subjective:     Niesha Pierre is a 24 y.o.  who presents for a postpartum visit.  She is status post  uncomplicated vaginal delivery 1 week  Started on zoloft for PP depression.  Also went to OB ED for blood patch which helped with headache.  She is breastfeeding and bottle feeding.  Feeling much better.  Ambulating and voiding.  Doing well with lovenox.    History reviewed. No pertinent past medical history.    Current Outpatient Medications:     cephALEXin (KEFLEX) 500 MG capsule, Take 1 capsule (500 mg total) by mouth every 12 (twelve) hours. for 7 days, Disp: 14 capsule, Rfl: 0    docusate sodium (COLACE) 100 MG capsule, Take 2 capsules (200 mg total) by mouth 2 (two) times daily., Disp: 60 capsule, Rfl: 2    enoxaparin (LOVENOX) 40 mg/0.4 mL Syrg, Inject 0.4 mLs (40 mg total) into the skin once daily., Disp: 12 mL, Rfl: 1    FINACEA 15 % gel, APPLY TO FACE ONCE OR TWICE DAILY, Disp: , Rfl: 3    ibuprofen (ADVIL,MOTRIN) 600 MG tablet, Take 1 tablet (600 mg total) by mouth every 6 (six) hours as needed (cramping)., Disp: 60 tablet, Rfl: 2    prenatal 25/iron fum/folic/dha (PRENATAL-1 ORAL), Take by mouth., Disp: , Rfl:     sertraline (ZOLOFT) 50 MG tablet, Take 1 tablet (50 mg total) by mouth once daily., Disp: 30 tablet, Rfl: 11    butalbital-acetaminophen-caffeine -40 mg (FIORICET, ESGIC) -40 mg per tablet, Take 1 tablet by mouth every 4 (four) hours as needed for Pain. (Patient not taking: Reported on 1/10/2020), Disp: 16 tablet, Rfl: 0    desonide (DESOWEN) 0.05 % lotion, Apply topically 2 (two) times daily. No longer than 2 weeks, Disp: 1 Bottle, Rfl: 1    ondansetron (ZOFRAN) 4 MG tablet, Take 1 tablet (4 mg total) by mouth every 6 (six) hours as needed for Nausea., Disp: 20 tablet, Rfl: 0    promethazine (PHENERGAN) 25 MG tablet, Take 0.5 tablets (12.5 mg total) by mouth every 6 (six) hours as needed for Nausea., Disp: 20 tablet, Rfl: 0    pyridoxine HCl, vitamin  B6, (VITAMIN B-6 ORAL), Take by mouth., Disp: , Rfl:       Objective:     Vitals:    01/10/20 0847   BP: 110/72       APPEARANCE: Well nourished, well developed, in no acute distress.  PSYCH: Appropriate mood and affect.  NECK:  Supple, no thyromegaly.  ABDOMEN:  Soft, nontender.  GENITOURINARY:  Deferred  EXTREMITIES: No edema.      Assessment:     Postpartum care and examination        Plan:     1. Postpartum course reviewed and discussed with patient.  All questions answered.  Return to clinic in 4 weeks.

## 2020-01-14 ENCOUNTER — PATIENT MESSAGE (OUTPATIENT)
Dept: OBSTETRICS AND GYNECOLOGY | Facility: CLINIC | Age: 25
End: 2020-01-14

## 2020-01-15 NOTE — TELEPHONE ENCOUNTER
Per patient:    Ok I can't believe this is actually happe I f I feel so problematic but I ha w a little fever and chills and breast pain.

## 2020-01-16 ENCOUNTER — PATIENT OUTREACH (OUTPATIENT)
Dept: ADMINISTRATIVE | Facility: OTHER | Age: 25
End: 2020-01-16

## 2020-01-16 ENCOUNTER — PATIENT MESSAGE (OUTPATIENT)
Dept: OBSTETRICS AND GYNECOLOGY | Facility: CLINIC | Age: 25
End: 2020-01-16

## 2020-01-16 ENCOUNTER — POSTPARTUM VISIT (OUTPATIENT)
Dept: OBSTETRICS AND GYNECOLOGY | Facility: CLINIC | Age: 25
End: 2020-01-16
Attending: STUDENT IN AN ORGANIZED HEALTH CARE EDUCATION/TRAINING PROGRAM
Payer: COMMERCIAL

## 2020-01-16 VITALS
WEIGHT: 137.25 LBS | SYSTOLIC BLOOD PRESSURE: 116 MMHG | DIASTOLIC BLOOD PRESSURE: 74 MMHG | TEMPERATURE: 100 F | HEIGHT: 65 IN | BODY MASS INDEX: 22.87 KG/M2

## 2020-01-16 DIAGNOSIS — N61.0 MASTITIS: ICD-10-CM

## 2020-01-16 PROCEDURE — 99999 PR PBB SHADOW E&M-EST. PATIENT-LVL III: CPT | Mod: PBBFAC,,, | Performed by: STUDENT IN AN ORGANIZED HEALTH CARE EDUCATION/TRAINING PROGRAM

## 2020-01-16 PROCEDURE — 99213 OFFICE O/P EST LOW 20 MIN: CPT | Mod: 24,S$GLB,, | Performed by: STUDENT IN AN ORGANIZED HEALTH CARE EDUCATION/TRAINING PROGRAM

## 2020-01-16 PROCEDURE — 99999 PR PBB SHADOW E&M-EST. PATIENT-LVL III: ICD-10-PCS | Mod: PBBFAC,,, | Performed by: STUDENT IN AN ORGANIZED HEALTH CARE EDUCATION/TRAINING PROGRAM

## 2020-01-16 PROCEDURE — 99213 PR OFFICE/OUTPT VISIT, EST, LEVL III, 20-29 MIN: ICD-10-PCS | Mod: 24,S$GLB,, | Performed by: STUDENT IN AN ORGANIZED HEALTH CARE EDUCATION/TRAINING PROGRAM

## 2020-01-16 RX ORDER — DICLOXACILLIN SODIUM 500 MG/1
500 CAPSULE ORAL EVERY 6 HOURS
Qty: 28 CAPSULE | Refills: 0 | Status: SHIPPED | OUTPATIENT
Start: 2020-01-16 | End: 2020-01-23

## 2020-01-16 NOTE — PROGRESS NOTES
Subjective:      Niesha Pierre is a 24 y.o.  who presents for a postpartum visit.  She is status post  uncomplicated vaginal delivery 3 weeks ago.  Has history of blood clot on lovenox.  Also needed blood patch with anesthesia.  On zoloft for postpartum depression.  Overall doing well.  Comes in today for breast pain and fevers that started yesterday.  Reports she had pain on the L but massaged it and that improved.  Had a fever last night and now has redness and pain on the R.  No other issues or concerns.  Baby is doing well and she is breastfeeding.      History reviewed. No pertinent past medical history.    Current Outpatient Medications:     butalbital-acetaminophen-caffeine -40 mg (FIORICET, ESGIC) -40 mg per tablet, Take 1 tablet by mouth every 4 (four) hours as needed for Pain., Disp: 16 tablet, Rfl: 0    docusate sodium (COLACE) 100 MG capsule, Take 2 capsules (200 mg total) by mouth 2 (two) times daily., Disp: 60 capsule, Rfl: 2    enoxaparin (LOVENOX) 40 mg/0.4 mL Syrg, Inject 0.4 mLs (40 mg total) into the skin once daily., Disp: 12 mL, Rfl: 1    FINACEA 15 % gel, APPLY TO FACE ONCE OR TWICE DAILY, Disp: , Rfl: 3    ibuprofen (ADVIL,MOTRIN) 600 MG tablet, Take 1 tablet (600 mg total) by mouth every 6 (six) hours as needed (cramping)., Disp: 60 tablet, Rfl: 2    ondansetron (ZOFRAN) 4 MG tablet, Take 1 tablet (4 mg total) by mouth every 6 (six) hours as needed for Nausea., Disp: 20 tablet, Rfl: 0    prenatal 25/iron fum/folic/dha (PRENATAL-1 ORAL), Take by mouth., Disp: , Rfl:     promethazine (PHENERGAN) 25 MG tablet, Take 0.5 tablets (12.5 mg total) by mouth every 6 (six) hours as needed for Nausea., Disp: 20 tablet, Rfl: 0    pyridoxine HCl, vitamin B6, (VITAMIN B-6 ORAL), Take by mouth., Disp: , Rfl:     sertraline (ZOLOFT) 50 MG tablet, Take 1 tablet (50 mg total) by mouth once daily., Disp: 30 tablet, Rfl: 11    desonide (DESOWEN) 0.05 % lotion, Apply  topically 2 (two) times daily. No longer than 2 weeks, Disp: 1 Bottle, Rfl: 1    dicloxacillin (DYNAPEN) 500 MG capsule, Take 1 capsule (500 mg total) by mouth every 6 (six) hours. for 7 days, Disp: 28 capsule, Rfl: 0      Objective:     Vitals:    01/16/20 1113   BP: 116/74   Temp: 99.7 °F (37.6 °C)       APPEARANCE: Well nourished, well developed, in no acute distress.  PSYCH: Appropriate mood and affect.  NECK:  Supple, no thyromegaly.  BREASTS:  No masses, no nipple discharge.  R breast with 3 x 4 cm area of erythema - no fluctuance of induration.  No masses appreciated bilaterally.  EXTREMITIES: No edema.      Assessment:     Postpartum care and examination    Mastitis    Other orders  -     dicloxacillin (DYNAPEN) 500 MG capsule; Take 1 capsule (500 mg total) by mouth every 6 (six) hours. for 7 days  Dispense: 28 capsule; Refill: 0        Plan:     1. Postpartum course reviewed and discussed with patient.  All questions answered.  Mastitis on exam:  Dicloxacillin sent over.  She will call if symptoms worsen or do not improve.  RTC in 2 weeks for PP visit.  All questions answered and ED precautions reviewed.

## 2020-01-16 NOTE — TELEPHONE ENCOUNTER
Spoke with the patient. Informer her that I spoke with the pharmacy to confirm that rx was sent. Was informed by pharmacy that rx was received and ready for . Patient will follow up with pharmacy.

## 2020-01-31 ENCOUNTER — PATIENT MESSAGE (OUTPATIENT)
Dept: OBSTETRICS AND GYNECOLOGY | Facility: CLINIC | Age: 25
End: 2020-01-31

## 2020-01-31 ENCOUNTER — POSTPARTUM VISIT (OUTPATIENT)
Dept: OBSTETRICS AND GYNECOLOGY | Facility: CLINIC | Age: 25
End: 2020-01-31
Attending: STUDENT IN AN ORGANIZED HEALTH CARE EDUCATION/TRAINING PROGRAM
Payer: COMMERCIAL

## 2020-01-31 VITALS
BODY MASS INDEX: 22.96 KG/M2 | DIASTOLIC BLOOD PRESSURE: 70 MMHG | SYSTOLIC BLOOD PRESSURE: 100 MMHG | HEIGHT: 65 IN | WEIGHT: 137.81 LBS

## 2020-01-31 DIAGNOSIS — Z11.3 SCREENING FOR STD (SEXUALLY TRANSMITTED DISEASE): Primary | ICD-10-CM

## 2020-01-31 PROCEDURE — 0503F POSTPARTUM CARE VISIT: CPT | Mod: S$GLB,,, | Performed by: STUDENT IN AN ORGANIZED HEALTH CARE EDUCATION/TRAINING PROGRAM

## 2020-01-31 PROCEDURE — 99999 PR PBB SHADOW E&M-EST. PATIENT-LVL III: CPT | Mod: PBBFAC,,, | Performed by: STUDENT IN AN ORGANIZED HEALTH CARE EDUCATION/TRAINING PROGRAM

## 2020-01-31 PROCEDURE — 99999 PR PBB SHADOW E&M-EST. PATIENT-LVL III: ICD-10-PCS | Mod: PBBFAC,,, | Performed by: STUDENT IN AN ORGANIZED HEALTH CARE EDUCATION/TRAINING PROGRAM

## 2020-01-31 PROCEDURE — 0503F PR POSTPARTUM CARE VISIT: ICD-10-PCS | Mod: S$GLB,,, | Performed by: STUDENT IN AN ORGANIZED HEALTH CARE EDUCATION/TRAINING PROGRAM

## 2020-01-31 RX ORDER — SERTRALINE HYDROCHLORIDE 50 MG/1
50 TABLET, FILM COATED ORAL DAILY
Qty: 30 TABLET | Refills: 11 | Status: SHIPPED | OUTPATIENT
Start: 2020-01-31 | End: 2020-02-26 | Stop reason: SDUPTHER

## 2020-01-31 RX ORDER — ENOXAPARIN SODIUM 100 MG/ML
40 INJECTION SUBCUTANEOUS DAILY
Qty: 12 ML | Refills: 6 | OUTPATIENT
Start: 2020-01-31 | End: 2020-10-09

## 2020-01-31 NOTE — PROGRESS NOTES
Subjective:      Niesha Pierre is a 25 y.o.  who presents for a postpartum visit.  She is status post  uncomplicated vaginal delivery 4 weeks ago.  Her hospitalization was not complicated.  She is breastfeeding.  She is undecided about contraceptoin.  She reports mood is stable on zoloft.  Doing well on Lovenox for PE.    Her last pap was NEM on 2019     History reviewed. No pertinent past medical history.    Current Outpatient Medications:     butalbital-acetaminophen-caffeine -40 mg (FIORICET, ESGIC) -40 mg per tablet, Take 1 tablet by mouth every 4 (four) hours as needed for Pain., Disp: 16 tablet, Rfl: 0    docusate sodium (COLACE) 100 MG capsule, Take 2 capsules (200 mg total) by mouth 2 (two) times daily., Disp: 60 capsule, Rfl: 2    enoxaparin (LOVENOX) 40 mg/0.4 mL Syrg, Inject 0.4 mLs (40 mg total) into the skin once daily., Disp: 12 mL, Rfl: 1    FINACEA 15 % gel, APPLY TO FACE ONCE OR TWICE DAILY, Disp: , Rfl: 3    ibuprofen (ADVIL,MOTRIN) 600 MG tablet, Take 1 tablet (600 mg total) by mouth every 6 (six) hours as needed (cramping)., Disp: 60 tablet, Rfl: 2    ondansetron (ZOFRAN) 4 MG tablet, Take 1 tablet (4 mg total) by mouth every 6 (six) hours as needed for Nausea., Disp: 20 tablet, Rfl: 0    prenatal 25/iron fum/folic/dha (PRENATAL-1 ORAL), Take by mouth., Disp: , Rfl:     promethazine (PHENERGAN) 25 MG tablet, Take 0.5 tablets (12.5 mg total) by mouth every 6 (six) hours as needed for Nausea., Disp: 20 tablet, Rfl: 0    pyridoxine HCl, vitamin B6, (VITAMIN B-6 ORAL), Take by mouth., Disp: , Rfl:     sertraline (ZOLOFT) 50 MG tablet, Take 1 tablet (50 mg total) by mouth once daily., Disp: 30 tablet, Rfl: 11    desonide (DESOWEN) 0.05 % lotion, Apply topically 2 (two) times daily. No longer than 2 weeks, Disp: 1 Bottle, Rfl: 1      Objective:     Vitals:    20 0924   BP: 100/70       APPEARANCE: Well nourished, well developed, in no acute  distress.  PSYCH: Appropriate mood and affect.  NECK:  Supple, no thyromegaly.  EXTREMITIES: No edema.      Assessment:     Screening for STD (sexually transmitted disease)  -     RPR; Future; Expected date: 01/31/2020  -     Cancel: HIV 1/2 Ag/Ab (4th Gen); Future; Expected date: 01/31/2020        Plan:     1. Postpartum course reviewed and discussed with patient.  All questions answered.  Return to clinic in 2 weeks.

## 2020-02-14 ENCOUNTER — POSTPARTUM VISIT (OUTPATIENT)
Dept: OBSTETRICS AND GYNECOLOGY | Facility: CLINIC | Age: 25
End: 2020-02-14
Attending: STUDENT IN AN ORGANIZED HEALTH CARE EDUCATION/TRAINING PROGRAM
Payer: COMMERCIAL

## 2020-02-14 VITALS
DIASTOLIC BLOOD PRESSURE: 64 MMHG | WEIGHT: 140.13 LBS | BODY MASS INDEX: 23.35 KG/M2 | HEIGHT: 65 IN | SYSTOLIC BLOOD PRESSURE: 106 MMHG

## 2020-02-14 PROCEDURE — 99999 PR PBB SHADOW E&M-EST. PATIENT-LVL III: CPT | Mod: PBBFAC,,, | Performed by: STUDENT IN AN ORGANIZED HEALTH CARE EDUCATION/TRAINING PROGRAM

## 2020-02-14 PROCEDURE — 0503F POSTPARTUM CARE VISIT: CPT | Mod: S$GLB,,, | Performed by: STUDENT IN AN ORGANIZED HEALTH CARE EDUCATION/TRAINING PROGRAM

## 2020-02-14 PROCEDURE — 0503F PR POSTPARTUM CARE VISIT: ICD-10-PCS | Mod: S$GLB,,, | Performed by: STUDENT IN AN ORGANIZED HEALTH CARE EDUCATION/TRAINING PROGRAM

## 2020-02-14 PROCEDURE — 99999 PR PBB SHADOW E&M-EST. PATIENT-LVL III: ICD-10-PCS | Mod: PBBFAC,,, | Performed by: STUDENT IN AN ORGANIZED HEALTH CARE EDUCATION/TRAINING PROGRAM

## 2020-02-14 NOTE — PROGRESS NOTES
Subjective:      Niesha Pierre is a 25 y.o.  who presents for a postpartum visit.  She is status post  uncomplicated vaginal delivery 6 weeks ago.  Her hospitalization was not complicated.  Continuing Lovenox 8 weeks PP secondary to DVT last year.  She is not breastfeeding.  She desires no method for contraception.  She reports mood is stable.  Does report some congestion and cough that started yesterday had a subjective fever yesterday as well no nausea no vomiting no body aches no other issues or concerns.      Her last pap was [facility] on 2019     History reviewed. No pertinent past medical history.    Current Outpatient Medications:     desonide (DESOWEN) 0.05 % lotion, Apply topically 2 (two) times daily. No longer than 2 weeks, Disp: 1 Bottle, Rfl: 1    docusate sodium (COLACE) 100 MG capsule, Take 2 capsules (200 mg total) by mouth 2 (two) times daily., Disp: 60 capsule, Rfl: 2    enoxaparin (LOVENOX) 40 mg/0.4 mL Syrg, Inject 0.4 mLs (40 mg total) into the skin once daily., Disp: 12 mL, Rfl: 6    FINACEA 15 % gel, APPLY TO FACE ONCE OR TWICE DAILY, Disp: , Rfl: 3    ibuprofen (ADVIL,MOTRIN) 600 MG tablet, Take 1 tablet (600 mg total) by mouth every 6 (six) hours as needed (cramping)., Disp: 60 tablet, Rfl: 2    ondansetron (ZOFRAN) 4 MG tablet, Take 1 tablet (4 mg total) by mouth every 6 (six) hours as needed for Nausea., Disp: 20 tablet, Rfl: 0    prenatal 25/iron fum/folic/dha (PRENATAL-1 ORAL), Take by mouth., Disp: , Rfl:     promethazine (PHENERGAN) 25 MG tablet, Take 0.5 tablets (12.5 mg total) by mouth every 6 (six) hours as needed for Nausea., Disp: 20 tablet, Rfl: 0    pyridoxine HCl, vitamin B6, (VITAMIN B-6 ORAL), Take by mouth., Disp: , Rfl:     sertraline (ZOLOFT) 50 MG tablet, Take 1 tablet (50 mg total) by mouth once daily., Disp: 30 tablet, Rfl: 11      Objective:     Vitals:    20 0835   BP: 106/64   T 100.1    APPEARANCE: Well nourished, well developed,  in no acute distress.  PSYCH: Appropriate mood and affect.  NECK:  Supple, no thyromegaly.  BREASTS:  No masses, no nipple discharge.  CARDIOVASCULAR:  Regular rate and rhythm.  LUNGS:  Clear to auscultation bilaterally.  ABDOMEN:  Soft, nontender.  GENITOURINARY:  External genitalia normal in appearance, normal perineal body, normal urethral meatus.  Vagina with scant discharge, no blood.  No lesions.  Cervix without lesion, C/T/H.  Uterus mobile, nontender, normal in size.  Adnexa without masses or TTP.    EXTREMITIES: No edema.      Assessment:     Postpartum care and examination        Plan:     1. Postpartum course reviewed and discussed with patient.  Discussed adequate rest and hydration for viral URI.  She will contact PCP or Urgent care if symptoms worsen or do not improve.  All questions answered.  Return to clinic in 1 year for annual exam.

## 2020-02-26 RX ORDER — SERTRALINE HYDROCHLORIDE 50 MG/1
50 TABLET, FILM COATED ORAL DAILY
Qty: 30 TABLET | Refills: 11 | Status: SHIPPED | OUTPATIENT
Start: 2020-02-26 | End: 2020-03-16 | Stop reason: SDUPTHER

## 2020-03-16 ENCOUNTER — PATIENT MESSAGE (OUTPATIENT)
Dept: OBSTETRICS AND GYNECOLOGY | Facility: CLINIC | Age: 25
End: 2020-03-16

## 2020-03-16 RX ORDER — SERTRALINE HYDROCHLORIDE 50 MG/1
50 TABLET, FILM COATED ORAL DAILY
Qty: 30 TABLET | Refills: 3 | Status: SHIPPED | OUTPATIENT
Start: 2020-03-16 | End: 2020-04-28 | Stop reason: SDUPTHER

## 2020-04-14 ENCOUNTER — PATIENT MESSAGE (OUTPATIENT)
Dept: OBSTETRICS AND GYNECOLOGY | Facility: CLINIC | Age: 25
End: 2020-04-14

## 2020-04-14 RX ORDER — FLUCONAZOLE 150 MG/1
150 TABLET ORAL DAILY
Qty: 2 TABLET | Refills: 0 | Status: SHIPPED | OUTPATIENT
Start: 2020-04-14 | End: 2020-04-15

## 2020-04-14 NOTE — TELEPHONE ENCOUNTER
Pt experiencing yeast infection symptoms.     Allergies and pharmacy up to date.    Diflucan pended.

## 2020-04-27 RX ORDER — SERTRALINE HYDROCHLORIDE 50 MG/1
50 TABLET, FILM COATED ORAL DAILY
Qty: 30 TABLET | Refills: 3 | OUTPATIENT
Start: 2020-04-27 | End: 2021-04-27

## 2020-04-28 ENCOUNTER — PATIENT MESSAGE (OUTPATIENT)
Dept: OBSTETRICS AND GYNECOLOGY | Facility: CLINIC | Age: 25
End: 2020-04-28

## 2020-04-28 RX ORDER — SERTRALINE HYDROCHLORIDE 50 MG/1
50 TABLET, FILM COATED ORAL DAILY
Qty: 30 TABLET | Refills: 3 | Status: SHIPPED | OUTPATIENT
Start: 2020-04-28 | End: 2020-05-01 | Stop reason: SDUPTHER

## 2020-04-28 NOTE — TELEPHONE ENCOUNTER
Per patient:    Hey! Just trying to get the refill for my medicine and it got denied. Was I supposed to be downing the dose ? I've been taking the amount prescribed and I know when you get off your slowly do it so I'll start doing that if I'm supposed to I just didn't know

## 2020-05-01 RX ORDER — SERTRALINE HYDROCHLORIDE 50 MG/1
50 TABLET, FILM COATED ORAL DAILY
Qty: 30 TABLET | Refills: 3 | Status: SHIPPED | OUTPATIENT
Start: 2020-05-01 | End: 2020-06-01 | Stop reason: SDUPTHER

## 2020-05-05 ENCOUNTER — PATIENT MESSAGE (OUTPATIENT)
Dept: HEMATOLOGY/ONCOLOGY | Facility: CLINIC | Age: 25
End: 2020-05-05

## 2020-05-05 ENCOUNTER — TELEPHONE (OUTPATIENT)
Dept: HEMATOLOGY/ONCOLOGY | Facility: CLINIC | Age: 25
End: 2020-05-05

## 2020-05-29 ENCOUNTER — TELEPHONE (OUTPATIENT)
Dept: OBSTETRICS AND GYNECOLOGY | Facility: CLINIC | Age: 25
End: 2020-05-29

## 2020-05-29 NOTE — TELEPHONE ENCOUNTER
Spoke with pt and she noticed this morning when she removed her tampon she had a vaginal odor.    She was treated a month ago for yeast.      Appt scheduled 6/1 with Dr. Alarcon.

## 2020-06-01 ENCOUNTER — OFFICE VISIT (OUTPATIENT)
Dept: OBSTETRICS AND GYNECOLOGY | Facility: CLINIC | Age: 25
End: 2020-06-01
Attending: STUDENT IN AN ORGANIZED HEALTH CARE EDUCATION/TRAINING PROGRAM
Payer: COMMERCIAL

## 2020-06-01 ENCOUNTER — PATIENT OUTREACH (OUTPATIENT)
Dept: ADMINISTRATIVE | Facility: OTHER | Age: 25
End: 2020-06-01

## 2020-06-01 VITALS
BODY MASS INDEX: 23.76 KG/M2 | DIASTOLIC BLOOD PRESSURE: 78 MMHG | HEIGHT: 65 IN | WEIGHT: 142.63 LBS | SYSTOLIC BLOOD PRESSURE: 108 MMHG

## 2020-06-01 DIAGNOSIS — R30.0 BURNING WITH URINATION: ICD-10-CM

## 2020-06-01 DIAGNOSIS — N89.8 VAGINAL DISCHARGE: Primary | ICD-10-CM

## 2020-06-01 LAB
BACTERIA HYPHAE, POC: POSITIVE
BILIRUB SERPL-MCNC: NORMAL MG/DL
BLOOD URINE, POC: NORMAL
COLOR, POC UA: YELLOW
GARDNERELLA VAGINALIS: NEGATIVE
GLUCOSE UR QL STRIP: NORMAL
KETONES UR QL STRIP: NORMAL
LEUKOCYTE ESTERASE URINE, POC: NORMAL
NITRITE, POC UA: NORMAL
OTHER MICROSC. OBSERVATIONS: ABNORMAL
PH, POC UA: 7
POC BACTERIAL VAGINOSIS: POSITIVE
POC CLUE CELLS: NEGATIVE
PROTEIN, POC: NORMAL
SPECIFIC GRAVITY, POC UA: 1
TRICHOMONAS, POC: NEGATIVE
UROBILINOGEN, POC UA: NORMAL
YEAST WET PREP: NEGATIVE
YEAST, POC: NEGATIVE

## 2020-06-01 PROCEDURE — 87086 URINE CULTURE/COLONY COUNT: CPT

## 2020-06-01 PROCEDURE — 99213 PR OFFICE/OUTPT VISIT, EST, LEVL III, 20-29 MIN: ICD-10-PCS | Mod: 25,S$GLB,, | Performed by: STUDENT IN AN ORGANIZED HEALTH CARE EDUCATION/TRAINING PROGRAM

## 2020-06-01 PROCEDURE — 81002 POCT URINE DIPSTICK WITHOUT MICROSCOPE: ICD-10-PCS | Mod: S$GLB,,, | Performed by: STUDENT IN AN ORGANIZED HEALTH CARE EDUCATION/TRAINING PROGRAM

## 2020-06-01 PROCEDURE — 81002 URINALYSIS NONAUTO W/O SCOPE: CPT | Mod: S$GLB,,, | Performed by: STUDENT IN AN ORGANIZED HEALTH CARE EDUCATION/TRAINING PROGRAM

## 2020-06-01 PROCEDURE — 87210 SMEAR WET MOUNT SALINE/INK: CPT | Mod: QW,S$GLB,, | Performed by: STUDENT IN AN ORGANIZED HEALTH CARE EDUCATION/TRAINING PROGRAM

## 2020-06-01 PROCEDURE — 99999 PR PBB SHADOW E&M-EST. PATIENT-LVL III: ICD-10-PCS | Mod: PBBFAC,,, | Performed by: STUDENT IN AN ORGANIZED HEALTH CARE EDUCATION/TRAINING PROGRAM

## 2020-06-01 PROCEDURE — 99213 OFFICE O/P EST LOW 20 MIN: CPT | Mod: 25,S$GLB,, | Performed by: STUDENT IN AN ORGANIZED HEALTH CARE EDUCATION/TRAINING PROGRAM

## 2020-06-01 PROCEDURE — 99999 PR PBB SHADOW E&M-EST. PATIENT-LVL III: CPT | Mod: PBBFAC,,, | Performed by: STUDENT IN AN ORGANIZED HEALTH CARE EDUCATION/TRAINING PROGRAM

## 2020-06-01 PROCEDURE — 87210 POCT KOH: ICD-10-PCS | Mod: QW,S$GLB,, | Performed by: STUDENT IN AN ORGANIZED HEALTH CARE EDUCATION/TRAINING PROGRAM

## 2020-06-01 RX ORDER — METRONIDAZOLE 500 MG/1
500 TABLET ORAL 2 TIMES DAILY
Qty: 14 TABLET | Refills: 0 | Status: SHIPPED | OUTPATIENT
Start: 2020-06-01 | End: 2020-06-08

## 2020-06-01 RX ORDER — SERTRALINE HYDROCHLORIDE 50 MG/1
50 TABLET, FILM COATED ORAL DAILY
Qty: 30 TABLET | Refills: 6 | Status: SHIPPED | OUTPATIENT
Start: 2020-06-01 | End: 2021-05-27

## 2020-06-01 NOTE — PROGRESS NOTES
Chief Complaint: Vaginal discharge     HPI:      Niesha Pierre is a 25 y.o.  who presents today for evaluation of vaginal discharge and odor.  Reports she noticed it last week with her cycle.  It has gotten better.  Has noticed some mild dysuria.  No hematuria.  No other issues or concerns.    OB History        1    Para   1    Term   1            AB        Living   1       SAB        TAB        Ectopic        Multiple   0    Live Births   1               History reviewed. No pertinent past medical history.  History reviewed. No pertinent surgical history.  Social History     Socioeconomic History    Marital status:      Spouse name: Not on file    Number of children: Not on file    Years of education: Not on file    Highest education level: Not on file   Occupational History    Not on file   Social Needs    Financial resource strain: Not on file    Food insecurity:     Worry: Not on file     Inability: Not on file    Transportation needs:     Medical: Not on file     Non-medical: Not on file   Tobacco Use    Smoking status: Former Smoker     Last attempt to quit: 2013     Years since quittin.1    Smokeless tobacco: Never Used   Substance and Sexual Activity    Alcohol use: Not Currently     Alcohol/week: 18.0 standard drinks     Types: 18 Cans of beer per week    Drug use: No    Sexual activity: Yes     Partners: Male   Lifestyle    Physical activity:     Days per week: Not on file     Minutes per session: Not on file    Stress: Not on file   Relationships    Social connections:     Talks on phone: Not on file     Gets together: Not on file     Attends Hindu service: Not on file     Active member of club or organization: Not on file     Attends meetings of clubs or organizations: Not on file     Relationship status: Not on file   Other Topics Concern    Not on file   Social History Narrative    Not on file     Family History   Problem Relation Age  of Onset    Hypertension Mother     Hyperlipidemia Mother     Hypertension Father     Hyperlipidemia Father     Hypertension Maternal Uncle     Hypertension Paternal Uncle     Stroke Maternal Grandfather     Diabetes Paternal Grandmother     Cancer Paternal Grandfather 80        Pancreatic       Current Outpatient Medications:     docusate sodium (COLACE) 100 MG capsule, Take 2 capsules (200 mg total) by mouth 2 (two) times daily., Disp: 60 capsule, Rfl: 2    enoxaparin (LOVENOX) 40 mg/0.4 mL Syrg, Inject 0.4 mLs (40 mg total) into the skin once daily., Disp: 12 mL, Rfl: 6    FINACEA 15 % gel, APPLY TO FACE ONCE OR TWICE DAILY, Disp: , Rfl: 3    ibuprofen (ADVIL,MOTRIN) 600 MG tablet, Take 1 tablet (600 mg total) by mouth every 6 (six) hours as needed (cramping)., Disp: 60 tablet, Rfl: 2    ondansetron (ZOFRAN) 4 MG tablet, Take 1 tablet (4 mg total) by mouth every 6 (six) hours as needed for Nausea., Disp: 20 tablet, Rfl: 0    prenatal 25/iron fum/folic/dha (PRENATAL-1 ORAL), Take by mouth., Disp: , Rfl:     sertraline (ZOLOFT) 50 MG tablet, Take 1 tablet (50 mg total) by mouth once daily., Disp: 30 tablet, Rfl: 3    desonide (DESOWEN) 0.05 % lotion, Apply topically 2 (two) times daily. No longer than 2 weeks, Disp: 1 Bottle, Rfl: 1    promethazine (PHENERGAN) 25 MG tablet, Take 0.5 tablets (12.5 mg total) by mouth every 6 (six) hours as needed for Nausea., Disp: 20 tablet, Rfl: 0    pyridoxine HCl, vitamin B6, (VITAMIN B-6 ORAL), Take by mouth., Disp: , Rfl:     ROS:     GENERAL: Denies unintentional weight gain or weight loss. Feeling well overall.   SKIN: Denies rash or lesions.   HEENT: Denies headaches, or vision changes.   CARDIOVASCULAR: Denies palpitations or chest pain.   RESPIRATORY: Denies shortness of breath or dyspnea on exertion.  BREASTS: Denies pain, lumps, or nipple discharge.   ABDOMEN: Denies abdominal pain, constipation, diarrhea, nausea, vomiting, change in  "appetite.  URINARY: Denies frequency, dysuria, hematuria.  NEUROLOGIC: Denies syncope or weakness.   PSYCHIATRIC: Denies depression, anxiety or mood swings.    Physical Exam:      PHYSICAL EXAM:  /78   Ht 5' 4.5" (1.638 m)   Wt 64.7 kg (142 lb 10.2 oz)   LMP 2020   BMI 24.11 kg/m²   Body mass index is 24.11 kg/m².     APPEARANCE: Well nourished, well developed, in no acute distress.  PSYCH: Appropriate mood and affect.  SKIN: No acne or hirsutism.  ABDOMEN: Soft.  No tenderness or masses.    PELVIC: Normal external genitalia without lesions.  Normal hair distribution.  Adequate perineal body, normal urethral meatus.  Vagina moist and well rugated without lesions. Thin grey discharge present.  Cervix pink, without lesions, discharge or tenderness.  No significant cystocele or rectocele.  Bimanual exam shows uterus to be normal size, regular, mobile and nontender.  Adnexa without masses or tenderness.    EXTREMITIES: No edema.  No tenderness to palpation.    Labs:  Wet prep + clue cells and amine odor    Assessment/Plan:     25 y.o.     Vaginal discharge  -     POCT WET PREP  -     POCT KOH    Burning with urination  -     POCT urine dipstick without microscope  -     POCT urine pregnancy          Counselin.  Bacterial vaginosis:  Flagyl to pharmacy.  No EtOH while on meds.   2.  Follow up with PCP for other health maintenance.  Patient doing well on zoloft.    3.  RTC in 1 year or sooner if needed.    Use of the [email or web address] Patient Portal discussed and encouraged during today's visit.     "

## 2020-06-02 LAB
BACTERIA UR CULT: NORMAL
BACTERIA UR CULT: NORMAL

## 2020-07-06 PROBLEM — Z3A.16 16 WEEKS GESTATION OF PREGNANCY: Status: RESOLVED | Noted: 2019-07-26 | Resolved: 2020-07-06

## 2020-08-14 DIAGNOSIS — Z11.59 NEED FOR HEPATITIS C SCREENING TEST: ICD-10-CM

## 2020-09-22 ENCOUNTER — OFFICE VISIT (OUTPATIENT)
Dept: URGENT CARE | Facility: CLINIC | Age: 25
End: 2020-09-22
Payer: COMMERCIAL

## 2020-09-22 VITALS
HEART RATE: 82 BPM | OXYGEN SATURATION: 99 % | DIASTOLIC BLOOD PRESSURE: 68 MMHG | TEMPERATURE: 98 F | BODY MASS INDEX: 23.32 KG/M2 | WEIGHT: 140 LBS | SYSTOLIC BLOOD PRESSURE: 112 MMHG | HEIGHT: 65 IN

## 2020-09-22 DIAGNOSIS — R09.81 NASAL CONGESTION: ICD-10-CM

## 2020-09-22 DIAGNOSIS — R05.9 COUGH: Primary | ICD-10-CM

## 2020-09-22 DIAGNOSIS — J02.9 PHARYNGITIS, UNSPECIFIED ETIOLOGY: ICD-10-CM

## 2020-09-22 LAB
CTP QC/QA: YES
SARS-COV-2 RDRP RESP QL NAA+PROBE: NEGATIVE

## 2020-09-22 PROCEDURE — U0002: ICD-10-PCS | Mod: S$GLB,,, | Performed by: PHYSICIAN ASSISTANT

## 2020-09-22 PROCEDURE — 99214 OFFICE O/P EST MOD 30 MIN: CPT | Mod: S$GLB,,, | Performed by: PHYSICIAN ASSISTANT

## 2020-09-22 PROCEDURE — 99214 PR OFFICE/OUTPT VISIT, EST, LEVL IV, 30-39 MIN: ICD-10-PCS | Mod: S$GLB,,, | Performed by: PHYSICIAN ASSISTANT

## 2020-09-22 PROCEDURE — U0002 COVID-19 LAB TEST NON-CDC: HCPCS | Mod: S$GLB,,, | Performed by: PHYSICIAN ASSISTANT

## 2020-09-22 RX ORDER — FLUTICASONE PROPIONATE 50 MCG
2 SPRAY, SUSPENSION (ML) NASAL DAILY
Qty: 16 G | Refills: 0 | Status: SHIPPED | OUTPATIENT
Start: 2020-09-22 | End: 2021-05-27

## 2020-09-22 RX ORDER — AZITHROMYCIN 250 MG/1
TABLET, FILM COATED ORAL
Qty: 6 TABLET | Refills: 0 | OUTPATIENT
Start: 2020-09-22 | End: 2020-10-09

## 2020-09-22 RX ORDER — PREDNISONE 10 MG/1
10 TABLET ORAL DAILY
Qty: 5 TABLET | Refills: 0 | Status: SHIPPED | OUTPATIENT
Start: 2020-09-22 | End: 2020-09-27

## 2020-09-22 RX ORDER — PROMETHAZINE HYDROCHLORIDE AND DEXTROMETHORPHAN HYDROBROMIDE 6.25; 15 MG/5ML; MG/5ML
5 SYRUP ORAL NIGHTLY PRN
Qty: 120 ML | Refills: 0 | Status: SHIPPED | OUTPATIENT
Start: 2020-09-22 | End: 2020-09-25

## 2020-09-22 NOTE — PATIENT INSTRUCTIONS
1.  Take all medications as directed. If you have been prescribed antibiotics, make sure to complete them.   2.  Rest and keep yourself/patient well hydrated. For adults, it is recommended to drink at least 8-10 glasses of water daily.   3.  For patients above 6 months of age who are not allergic to and are not on anticoagulants, you can alternate Tylenol and Motrin every 4-6 hours for fever above 100.4F and/or pain.  For patients less than 6 months of age, allergic to or intolerant to NSAIDS, have gastritis, gastric ulcers, or history of GI bleeds, are pregnant, or are on anticoagulant therapy, you can take Tylenol every 4 hours as needed for fever above 100.4F and/or pain.   4. You should schedule a follow-up appointment with your Primary Care Provider/Pediatrician for recheck in 2-3 days or as directed at this visit.   5.  If your condition fails to improve in a timely manner, you should receive another evaluation by your Primary Care Provider/Pediatrician to discuss your concerns or return to urgent care for a recheck.  If your condition worsens at any time, you should report immediately to your nearest Emergency Department for further evaluation. **You must understand that you have received Urgent Care treatment only and that you may be released before all of your medical problems are known or treated. You, the patient, are responsible to arrange for follow-up care as instructed.         When You Have a Sore Throat    A sore throat can be painful. There are many reasons why you may have a sore throat. Your healthcare provider will work with you to find the cause of your sore throat. He or she will also find the best treatment for you.  What causes a sore throat?  Sore throats can be caused or worsened by:  · Cold or flu viruses  · Bacteria  · Irritants such as tobacco smoke or air pollution  · Acid reflux  A healthy throat  The tonsils are on the sides of the throat near the base of the tongue. They collect  viruses and bacteria and help fight infection. The throat (pharynx) is the passage for air. Mucus from the nasal cavity also moves down the passage.  An inflamed throat  The tonsils and pharynx can become inflamed due to a cold or flu virus. Postnasal drip (excess mucus draining from the nasal cavity) can irritate the throat. It can also make the throat or tonsils more likely to be infected by bacteria. Severe, untreated tonsillitis in children or adults can cause a pocket of pus (abscess) to form near the tonsil.  Your evaluation  A medical evaluation can help find the cause of your sore throat. It can also help your healthcare provider choose the best treatment for you. The evaluation may include a health history, physical exam, and diagnostic tests.  Health history  Your healthcare provider may ask you the following:  · How long has the sore throat lasted and how have you been treating it?  · Do you have any other symptoms, such as body aches, fever, or cough?  · Does your sore throat recur? If so, how often? How many days of school or work have you missed because of a sore throat?  · Do you have trouble eating or swallowing?  · Have you been told that you snore or have other sleep problems?  · Do you have bad breath?  · Do you cough up bad-tasting mucus?  Physical exam  During the exam, your healthcare provider checks your ears, nose, and throat for problems. He or she also checks for swelling in the neck, and may listen to your chest.  Possible tests  Other tests your healthcare provider may perform include:  · A throat swab to check for bacteria such as streptococcus (the bacteria that causes strep throat)  · A blood test to check for mononucleosis (a viral infection)  · A chest X-ray to rule out pneumonia, especially if you have a cough  Treating a sore throat  Treatment depends on many factors. What is the likely cause? Is the problem recent? Does it keep coming back? In many cases, the best thing to do is  "to treat the symptoms, rest, and let the problem heal itself. Antibiotics may help clear up some bacterial infections. For cases of severe or recurring tonsillitis, the tonsils may need to be removed.  Relieving your symptoms  · Dont smoke, and avoid secondhand smoke.  · For children, try throat sprays or Popsicles. Adults and older children may try lozenges.  · Drink warm liquids to soothe the throat and help thin mucus. Avoid alcohol, spicy foods, and acidic drinks such as orange juice. These can irritate the throat.  · Gargle with warm saltwater (1 teaspoon of salt to 8 ounces of warm water).  · Use a humidifier to keep air moist and relieve throat dryness.  · Try over-the-counter pain relievers such as acetaminophen or ibuprofen. Use as directed, and dont exceed the recommended dose. Dont give aspirin to children.   Are antibiotics needed?  If your sore throat is due to a bacterial infection, antibiotics may speed healing and prevent complications. Although group A streptococcus ("strep throat" or GAS) is the major treatable infection for a sore throat, GAS causes only 5% to 15% of sore throats in adults who seek medical care. Most sore throats are caused by cold or flu viruses. And antibiotics dont treat viral illness. In fact, using antibiotics when theyre not needed may produce bacteria that are harder to kill. Your healthcare provider will prescribe antibiotics only if he or she thinks they are likely to help.  If antibiotics are prescribed  Take the medicine exactly as directed. Be sure to finish your prescription even if youre feeling better. And be sure to ask your healthcare provider or pharmacist what side effects are common and what to do about them.  Is surgery needed?  In some cases, tonsils need to be removed. This is often done as outpatient (same-day) surgery. Your healthcare provider may advise removing the tonsils in cases of:  · Several severe bouts of tonsillitis in a year. Severe " episodes include those that lead to missed days of school or work, or that need to be treated with antibiotics.  · Tonsillitis that causes breathing problems during sleep  · Tonsillitis caused by food particles collecting in pouches in the tonsils (cryptic tonsillitis)  Call your healthcare provider if any of the following occur:  · Symptoms worsen, or new symptoms develop.  · Swollen tonsils make breathing difficult.  · The pain is severe enough to keep you from drinking liquids.  · A skin rash, hives, or wheezing develops. Any of these could signal an allergic reaction to antibiotics.  · Symptoms dont improve within a week.  · Symptoms dont improve within 2 to 3 days of starting antibiotics.   Date Last Reviewed: 10/1/2016  © 5131-9288 [employer]. [address] [place] [address], [place] [address]. All rights reserved. This information is not intended as a substitute for professional medical care. Always follow your healthcare professional's instructions.

## 2020-09-22 NOTE — PROGRESS NOTES
"Subjective:       Patient ID: Niesha Pierre is a 25 y.o. female.    Vitals:  height is 5' 5" (1.651 m) and weight is 63.5 kg (140 lb). Her temporal temperature is 98.2 °F (36.8 °C). Her blood pressure is 112/68 and her pulse is 82. Her oxygen saturation is 99%.     Chief Complaint: Sinus Problem    Patient presents with cough, nasal congestion and green sputum when she coughs . Patient reports feeling drainage in the back of her throat with sinus pain and pressure.  She also reports a bad sore throat. Onset of symptoms one day ago . Patient denies fever .     Sinus Problem  This is a new problem. The current episode started yesterday. The problem has been gradually worsening since onset. There has been no fever. The pain is moderate. Associated symptoms include chills, congestion, coughing, headaches, sinus pressure, a sore throat and swollen glands. Pertinent negatives include no diaphoresis, ear pain, hoarse voice, neck pain, shortness of breath or sneezing. Treatments tried: zrytec  The treatment provided no relief.       Constitution: Positive for chills and fatigue. Negative for sweating and fever.   HENT: Positive for congestion, postnasal drip, sinus pain, sinus pressure and sore throat. Negative for ear pain, trouble swallowing and voice change.    Neck: Negative for neck pain and painful lymph nodes.   Cardiovascular: Negative for chest pain and leg swelling.   Eyes: Negative for double vision and blurred vision.   Respiratory: Positive for cough and sputum production. Negative for shortness of breath.    Gastrointestinal: Negative for abdominal pain, nausea, vomiting and diarrhea.   Genitourinary: Negative for dysuria, frequency, urgency and history of kidney stones.   Musculoskeletal: Negative for joint pain, joint swelling, muscle cramps and muscle ache.   Skin: Negative for color change, pale, rash and bruising.   Allergic/Immunologic: Negative for seasonal allergies and sneezing. "   Neurological: Positive for headaches. Negative for dizziness, history of vertigo, light-headedness and passing out.   Hematologic/Lymphatic: Negative for swollen lymph nodes.   Psychiatric/Behavioral: Negative for nervous/anxious, sleep disturbance and depression. The patient is not nervous/anxious.        Objective:      Physical Exam   Constitutional: She is oriented to person, place, and time. She appears well-developed. She is cooperative.  Non-toxic appearance. She does not appear ill. No distress.   HENT:   Head: Normocephalic and atraumatic.   Ears:   Right Ear: Hearing, tympanic membrane, external ear and ear canal normal.   Left Ear: Hearing, tympanic membrane, external ear and ear canal normal.   Nose: Mucosal edema (and nasal congestion) present. No rhinorrhea or nasal deformity. No epistaxis. Right sinus exhibits no maxillary sinus tenderness and no frontal sinus tenderness. Left sinus exhibits no maxillary sinus tenderness and no frontal sinus tenderness.   Mouth/Throat: Uvula is midline and mucous membranes are normal. No trismus in the jaw. Normal dentition. No uvula swelling. Posterior oropharyngeal edema and posterior oropharyngeal erythema present. No oropharyngeal exudate, tonsillar abscesses or cobblestoning. No tonsillar exudate.   Eyes: Conjunctivae and lids are normal. No scleral icterus.   Neck: Trachea normal, full passive range of motion without pain and phonation normal. Neck supple. No neck rigidity. No edema and no erythema present.   Cardiovascular: Normal rate, regular rhythm, normal heart sounds and normal pulses.   Pulmonary/Chest: Effort normal and breath sounds normal. No respiratory distress. She has no decreased breath sounds. She has no rhonchi.   Abdominal: Normal appearance.   Musculoskeletal: Normal range of motion.         General: No deformity.   Lymphadenopathy:     She has cervical adenopathy.   Neurological: She is alert and oriented to person, place, and time. She  exhibits normal muscle tone. Coordination normal.   Skin: Skin is warm, dry, intact, not diaphoretic and not pale. Psychiatric: Her speech is normal and behavior is normal. Judgment and thought content normal.   Nursing note and vitals reviewed.        Results for orders placed or performed in visit on 09/22/20   POCT COVID-19 Rapid Screening   Result Value Ref Range    POC Rapid COVID Negative Negative     Acceptable Yes        Assessment:       1. Cough    2. Nasal congestion    3. Pharyngitis, unspecified etiology        Plan:         Cough  -     POCT COVID-19 Rapid Screening  -     promethazine-dextromethorphan (PROMETHAZINE-DM) 6.25-15 mg/5 mL Syrp; Take 5 mLs by mouth nightly as needed.  Dispense: 120 mL; Refill: 0    Nasal congestion  -     fluticasone propionate (FLONASE) 50 mcg/actuation nasal spray; 2 sprays (100 mcg total) by Each Nostril route once daily.  Dispense: 16 g; Refill: 0    Pharyngitis, unspecified etiology  -     azithromycin (ZITHROMAX) 250 MG tablet; Take 2 tablets (500 mg) on  Day 1,  followed by 1 tablet (250 mg) once daily on Days 2 through 5.  Dispense: 6 tablet; Refill: 0  -     predniSONE (DELTASONE) 10 MG tablet; Take 1 tablet (10 mg total) by mouth once daily. for 5 days  Dispense: 5 tablet; Refill: 0        All point of care testing performed at this visit reviewed by me prior to patient discharge.    Patient Instructions     1.  Take all medications as directed. If you have been prescribed antibiotics, make sure to complete them.   2.  Rest and keep yourself/patient well hydrated. For adults, it is recommended to drink at least 8-10 glasses of water daily.   3.  For patients above 6 months of age who are not allergic to and are not on anticoagulants, you can alternate Tylenol and Motrin every 4-6 hours for fever above 100.4F and/or pain.  For patients less than 6 months of age, allergic to or intolerant to NSAIDS, have gastritis, gastric ulcers, or history of GI  bleeds, are pregnant, or are on anticoagulant therapy, you can take Tylenol every 4 hours as needed for fever above 100.4F and/or pain.   4. You should schedule a follow-up appointment with your Primary Care Provider/Pediatrician for recheck in 2-3 days or as directed at this visit.   5.  If your condition fails to improve in a timely manner, you should receive another evaluation by your Primary Care Provider/Pediatrician to discuss your concerns or return to urgent care for a recheck.  If your condition worsens at any time, you should report immediately to your nearest Emergency Department for further evaluation. **You must understand that you have received Urgent Care treatment only and that you may be released before all of your medical problems are known or treated. You, the patient, are responsible to arrange for follow-up care as instructed.         When You Have a Sore Throat    A sore throat can be painful. There are many reasons why you may have a sore throat. Your healthcare provider will work with you to find the cause of your sore throat. He or she will also find the best treatment for you.  What causes a sore throat?  Sore throats can be caused or worsened by:  · Cold or flu viruses  · Bacteria  · Irritants such as tobacco smoke or air pollution  · Acid reflux  A healthy throat  The tonsils are on the sides of the throat near the base of the tongue. They collect viruses and bacteria and help fight infection. The throat (pharynx) is the passage for air. Mucus from the nasal cavity also moves down the passage.  An inflamed throat  The tonsils and pharynx can become inflamed due to a cold or flu virus. Postnasal drip (excess mucus draining from the nasal cavity) can irritate the throat. It can also make the throat or tonsils more likely to be infected by bacteria. Severe, untreated tonsillitis in children or adults can cause a pocket of pus (abscess) to form near the tonsil.  Your evaluation  A medical  evaluation can help find the cause of your sore throat. It can also help your healthcare provider choose the best treatment for you. The evaluation may include a health history, physical exam, and diagnostic tests.  Health history  Your healthcare provider may ask you the following:  · How long has the sore throat lasted and how have you been treating it?  · Do you have any other symptoms, such as body aches, fever, or cough?  · Does your sore throat recur? If so, how often? How many days of school or work have you missed because of a sore throat?  · Do you have trouble eating or swallowing?  · Have you been told that you snore or have other sleep problems?  · Do you have bad breath?  · Do you cough up bad-tasting mucus?  Physical exam  During the exam, your healthcare provider checks your ears, nose, and throat for problems. He or she also checks for swelling in the neck, and may listen to your chest.  Possible tests  Other tests your healthcare provider may perform include:  · A throat swab to check for bacteria such as streptococcus (the bacteria that causes strep throat)  · A blood test to check for mononucleosis (a viral infection)  · A chest X-ray to rule out pneumonia, especially if you have a cough  Treating a sore throat  Treatment depends on many factors. What is the likely cause? Is the problem recent? Does it keep coming back? In many cases, the best thing to do is to treat the symptoms, rest, and let the problem heal itself. Antibiotics may help clear up some bacterial infections. For cases of severe or recurring tonsillitis, the tonsils may need to be removed.  Relieving your symptoms  · Dont smoke, and avoid secondhand smoke.  · For children, try throat sprays or Popsicles. Adults and older children may try lozenges.  · Drink warm liquids to soothe the throat and help thin mucus. Avoid alcohol, spicy foods, and acidic drinks such as orange juice. These can irritate the throat.  · Gargle with warm  "saltwater (1 teaspoon of salt to 8 ounces of warm water).  · Use a humidifier to keep air moist and relieve throat dryness.  · Try over-the-counter pain relievers such as acetaminophen or ibuprofen. Use as directed, and dont exceed the recommended dose. Dont give aspirin to children.   Are antibiotics needed?  If your sore throat is due to a bacterial infection, antibiotics may speed healing and prevent complications. Although group A streptococcus ("strep throat" or GAS) is the major treatable infection for a sore throat, GAS causes only 5% to 15% of sore throats in adults who seek medical care. Most sore throats are caused by cold or flu viruses. And antibiotics dont treat viral illness. In fact, using antibiotics when theyre not needed may produce bacteria that are harder to kill. Your healthcare provider will prescribe antibiotics only if he or she thinks they are likely to help.  If antibiotics are prescribed  Take the medicine exactly as directed. Be sure to finish your prescription even if youre feeling better. And be sure to ask your healthcare provider or pharmacist what side effects are common and what to do about them.  Is surgery needed?  In some cases, tonsils need to be removed. This is often done as outpatient (same-day) surgery. Your healthcare provider may advise removing the tonsils in cases of:  · Several severe bouts of tonsillitis in a year. Severe episodes include those that lead to missed days of school or work, or that need to be treated with antibiotics.  · Tonsillitis that causes breathing problems during sleep  · Tonsillitis caused by food particles collecting in pouches in the tonsils (cryptic tonsillitis)  Call your healthcare provider if any of the following occur:  · Symptoms worsen, or new symptoms develop.  · Swollen tonsils make breathing difficult.  · The pain is severe enough to keep you from drinking liquids.  · A skin rash, hives, or wheezing develops. Any of these could " signal an allergic reaction to antibiotics.  · Symptoms dont improve within a week.  · Symptoms dont improve within 2 to 3 days of starting antibiotics.   Date Last Reviewed: 10/1/2016  © 0593-1021 [employer]. [address] [place] [address], [place] [address]. All rights reserved. This information is not intended as a substitute for professional medical care. Always follow your healthcare professional's instructions.

## 2020-09-25 ENCOUNTER — TELEPHONE (OUTPATIENT)
Dept: URGENT CARE | Facility: CLINIC | Age: 25
End: 2020-09-25

## 2020-10-05 ENCOUNTER — PATIENT MESSAGE (OUTPATIENT)
Dept: ADMINISTRATIVE | Facility: HOSPITAL | Age: 25
End: 2020-10-05

## 2020-10-09 ENCOUNTER — PATIENT MESSAGE (OUTPATIENT)
Dept: FAMILY MEDICINE | Facility: CLINIC | Age: 25
End: 2020-10-09

## 2020-10-09 ENCOUNTER — HOSPITAL ENCOUNTER (EMERGENCY)
Facility: HOSPITAL | Age: 25
Discharge: HOME | End: 2020-10-09
Attending: EMERGENCY MEDICINE
Payer: COMMERCIAL

## 2020-10-09 ENCOUNTER — TELEPHONE (OUTPATIENT)
Dept: FAMILY MEDICINE | Facility: CLINIC | Age: 25
End: 2020-10-09

## 2020-10-09 VITALS
WEIGHT: 140 LBS | DIASTOLIC BLOOD PRESSURE: 79 MMHG | SYSTOLIC BLOOD PRESSURE: 128 MMHG | OXYGEN SATURATION: 97 % | RESPIRATION RATE: 20 BRPM | TEMPERATURE: 99 F | HEIGHT: 65 IN | BODY MASS INDEX: 23.32 KG/M2 | HEART RATE: 87 BPM

## 2020-10-09 DIAGNOSIS — M76.892 HAMSTRING TENDINITIS OF LEFT THIGH: Primary | ICD-10-CM

## 2020-10-09 PROCEDURE — 99283 EMERGENCY DEPT VISIT LOW MDM: CPT

## 2020-10-09 RX ORDER — IBUPROFEN 600 MG/1
600 TABLET ORAL EVERY 6 HOURS PRN
Qty: 20 TABLET | Refills: 0 | Status: SHIPPED | OUTPATIENT
Start: 2020-10-09 | End: 2021-05-27

## 2020-10-09 NOTE — TELEPHONE ENCOUNTER
"----- Message from Adrianne Pollock sent at 10/9/2020  9:26 AM CDT -----  Regarding: Returning Call  Name of Who is Calling:  Niesha Pierre      What is the request in detail:  Patient called stating, "she's returning the office's call and would like you to please call again."     Please further advise.      Reply by [facility]: yes      Call Back: [phone]                                      "

## 2020-10-09 NOTE — ED NOTES
Adult Physical Assessment  LOC: Niesha Pierre, 25 y.o. female verified via two identifiers.  The patient is awake, alert, oriented and speaking appropriately at this time.  APPEARANCE: Patient resting comfortably and appears to be in no acute distress at this time. Patient is clean and well groomed, patient's clothing is properly fastened.  SKIN:The skin is warm and dry, color consistent with ethnicity, patient has normal skin turgor and moist mucus membranes, skin intact, no breakdown or brusing noted.  MUSCULOSKELETAL: Patient moving all extremities well, no obvious swelling or deformities noted.  RESPIRATORY: Airway is open and patent, respirations are spontaneous, patient has a normal effort and rate, no accessory muscle use noted.  CARDIAC: Patient has a normal rate and rhythm, no periphreal edema noted in any extremity, capillary refill < 3 seconds in all extremities  ABDOMEN: Soft and non tender to palpation, no abdominal distention noted. Bowel sounds present in all four quadrants.  NEUROLOGIC: Eyes open spontaneously, behavior appropriate to situation, follows commands, facial expression symmetrical, bilateral hand grasp equal and even, purposeful motor response noted, normal sensation in all extremities when touched with a finger.

## 2020-10-09 NOTE — DISCHARGE INSTRUCTIONS
Thank you for choosing [facility]! We appreciate you coming to us for your medical care. We hope you feel better soon! Please come back to [facility] for all of your future medical needs.    Our goal in the emergency department is to always give you outstanding care and exceptional service. You may receive a survey by mail or e-mail in the next week regarding your experience in our ED. We would greatly appreciate your completing and returning the survey. Your feedback provides us with a way to recognize our staff who give very good care and it helps us learn how to improve when your experience was below our aspiration of excellence.    Sincerely,      Chandan Varner MD  Medical Director  Emergency Department  [facility]

## 2020-10-09 NOTE — ED PROVIDER NOTES
Encounter Date: 10/9/2020       History     Chief Complaint   Patient presents with    Leg Pain     left calf pain since last p.m.. no redness or swelling but has hx. of dvt LLE. denies sob     HPI   This is a 25 y.o. female who has no past medical history on file.     The patient presents to the Emergency Department with pain on the outside of the left knee towards the rear/calf.  Symptoms started suddenly last night while sleeping, described as a throbbing pain..   Symptoms are associated with nothing.  Pt denies redness, swelling, numbness, back pain.   Symptoms are aggravated by palpation, straightening the leg.  Symptoms are relieved by nothing.   Patient has history of DVT and was concerned.  Not on OCPs, no history of trauma or injury, nonsmoker.      Review of patient's allergies indicates:  No Known Allergies  History reviewed. No pertinent past medical history.  History reviewed. No pertinent surgical history.  Family History   Problem Relation Age of Onset    Hypertension Mother     Hyperlipidemia Mother     Hypertension Father     Hyperlipidemia Father     Hypertension Maternal Uncle     Hypertension Paternal Uncle     Stroke Maternal Grandfather     Diabetes Paternal Grandmother     Cancer Paternal Grandfather 80        Pancreatic     Social History     Tobacco Use    Smoking status: Former Smoker     Quit date: 2013     Years since quittin.5    Smokeless tobacco: Never Used   Substance Use Topics    Alcohol use: Not Currently     Alcohol/week: 18.0 standard drinks     Types: 18 Cans of beer per week    Drug use: No     Review of Systems   Constitutional: Negative for activity change.   Cardiovascular: Negative for leg swelling.   Musculoskeletal: Negative for back pain.        Left leg pain   Skin: Negative for color change, rash and wound.   Neurological: Negative for weakness and numbness.   Psychiatric/Behavioral: Positive for sleep disturbance.       Physical Exam     Initial  Vitals [10/09/20 1024]   BP Pulse Resp Temp SpO2   128/79 87 20 98.8 °F (37.1 °C) 97 %      MAP       --         Physical Exam    Nursing note and vitals reviewed.  Constitutional: She appears well-developed and well-nourished. She is not diaphoretic. No distress.   HENT:   Head: Normocephalic and atraumatic.   Mouth/Throat: Oropharynx is clear and moist.   Eyes: Conjunctivae are normal.   Cardiovascular: Normal rate, regular rhythm and intact distal pulses.   Pulmonary/Chest: No respiratory distress.   Musculoskeletal: Normal range of motion. Tenderness present.      Comments: Tenderness to biceps femoris on the left. No overlying skin changes, swelling, wound. No calf tenderness, negative homans sign. No LE edema. Strong pulses.    Some tenderness to the biceps femoris area with full knee extension   Neurological: She is alert and oriented to person, place, and time.   Skin: Skin is warm and dry. Capillary refill takes less than 2 seconds. No rash noted. No erythema.   Psychiatric: She has a normal mood and affect.         ED Course   Procedures  Labs Reviewed - No data to display       Imaging Results    None          Medical Decision Making:   Initial Assessment:   Emergent evaluation of a patient with prior history of DVT presents with left leg tenderness, pinpoint around the biceps femoris on exam.  There is no overlying skin changes, no leg swelling, no history of trauma.  I do not suspect DVT at this time.  No evidence of cellulitis, abscess, rash.  Recommend rest, ice, NSAIDs.  Return precautions discussed.                             Clinical Impression:       ICD-10-CM ICD-9-CM   1. Hamstring tendinitis of left thigh  M76.892 727.09                          ED Disposition Condition    Discharge Stable        ED Prescriptions     Medication Sig Dispense Start Date End Date Auth. Provider    ibuprofen (ADVIL,MOTRIN) 600 MG tablet Take 1 tablet (600 mg total) by mouth every 6 (six) hours as needed for Pain.  20 tablet 10/9/2020  Chandan Varner MD        Follow-up Information    None                                      Chandan Varner MD  10/09/20 0080

## 2020-10-09 NOTE — TELEPHONE ENCOUNTER
Return call to Pt, and she states that she already went to the hospital and got it taken care of.

## 2020-10-09 NOTE — ED TRIAGE NOTES
left calf pain since last p.m.. no redness or swelling but has hx. of dvt LLE. denies sob

## 2020-10-20 ENCOUNTER — TELEPHONE (OUTPATIENT)
Dept: OBSTETRICS AND GYNECOLOGY | Facility: CLINIC | Age: 25
End: 2020-10-20

## 2020-10-20 NOTE — TELEPHONE ENCOUNTER
Pt went to remove tampon but couldn't find it.  Advised if she doesn't feel it inside the vagina she either forgot she already removed it or never put it in.  Offered appt.  She will call back to schedule.  Wants to double check that she didn't throw it away.  ED precautions discussed.

## 2020-10-25 ENCOUNTER — PATIENT MESSAGE (OUTPATIENT)
Dept: FAMILY MEDICINE | Facility: CLINIC | Age: 25
End: 2020-10-25

## 2020-11-02 ENCOUNTER — PATIENT MESSAGE (OUTPATIENT)
Dept: FAMILY MEDICINE | Facility: CLINIC | Age: 25
End: 2020-11-02

## 2020-11-05 ENCOUNTER — PATIENT MESSAGE (OUTPATIENT)
Dept: OBSTETRICS AND GYNECOLOGY | Facility: CLINIC | Age: 25
End: 2020-11-05

## 2020-11-10 ENCOUNTER — PATIENT OUTREACH (OUTPATIENT)
Dept: ADMINISTRATIVE | Facility: OTHER | Age: 25
End: 2020-11-10

## 2020-11-10 ENCOUNTER — LAB VISIT (OUTPATIENT)
Dept: LAB | Facility: OTHER | Age: 25
End: 2020-11-10
Attending: STUDENT IN AN ORGANIZED HEALTH CARE EDUCATION/TRAINING PROGRAM
Payer: COMMERCIAL

## 2020-11-10 ENCOUNTER — OFFICE VISIT (OUTPATIENT)
Dept: OBSTETRICS AND GYNECOLOGY | Facility: CLINIC | Age: 25
End: 2020-11-10
Attending: STUDENT IN AN ORGANIZED HEALTH CARE EDUCATION/TRAINING PROGRAM
Payer: COMMERCIAL

## 2020-11-10 VITALS
WEIGHT: 145.63 LBS | SYSTOLIC BLOOD PRESSURE: 106 MMHG | HEIGHT: 65 IN | BODY MASS INDEX: 24.26 KG/M2 | DIASTOLIC BLOOD PRESSURE: 78 MMHG

## 2020-11-10 DIAGNOSIS — Z11.59 NEED FOR HEPATITIS C SCREENING TEST: ICD-10-CM

## 2020-11-10 DIAGNOSIS — N92.6 IRREGULAR PERIODS/MENSTRUAL CYCLES: Primary | ICD-10-CM

## 2020-11-10 DIAGNOSIS — Z12.4 SCREENING FOR MALIGNANT NEOPLASM OF CERVIX: ICD-10-CM

## 2020-11-10 DIAGNOSIS — N92.6 IRREGULAR PERIODS/MENSTRUAL CYCLES: ICD-10-CM

## 2020-11-10 LAB
B-HCG UR QL: NEGATIVE
BASOPHILS # BLD AUTO: 0.1 K/UL (ref 0–0.2)
BASOPHILS NFR BLD: 1.8 % (ref 0–1.9)
CHOLEST SERPL-MCNC: 213 MG/DL (ref 120–199)
CHOLEST/HDLC SERPL: 2.4 {RATIO} (ref 2–5)
CTP QC/QA: YES
DIFFERENTIAL METHOD: NORMAL
EOSINOPHIL # BLD AUTO: 0.3 K/UL (ref 0–0.5)
EOSINOPHIL NFR BLD: 4.5 % (ref 0–8)
ERYTHROCYTE [DISTWIDTH] IN BLOOD BY AUTOMATED COUNT: 12.5 % (ref 11.5–14.5)
ESTRADIOL SERPL-MCNC: 27 PG/ML
FSH SERPL-ACNC: 8.3 MIU/ML
HCT VFR BLD AUTO: 43.3 % (ref 37–48.5)
HDLC SERPL-MCNC: 87 MG/DL (ref 40–75)
HDLC SERPL: 40.8 % (ref 20–50)
HGB BLD-MCNC: 14.2 G/DL (ref 12–16)
IMM GRANULOCYTES # BLD AUTO: 0.01 K/UL (ref 0–0.04)
IMM GRANULOCYTES NFR BLD AUTO: 0.2 % (ref 0–0.5)
LDLC SERPL CALC-MCNC: 112.4 MG/DL (ref 63–159)
LYMPHOCYTES # BLD AUTO: 1.7 K/UL (ref 1–4.8)
LYMPHOCYTES NFR BLD: 30.4 % (ref 18–48)
MCH RBC QN AUTO: 28.6 PG (ref 27–31)
MCHC RBC AUTO-ENTMCNC: 32.8 G/DL (ref 32–36)
MCV RBC AUTO: 87 FL (ref 82–98)
MONOCYTES # BLD AUTO: 0.5 K/UL (ref 0.3–1)
MONOCYTES NFR BLD: 8.5 % (ref 4–15)
NEUTROPHILS # BLD AUTO: 3 K/UL (ref 1.8–7.7)
NEUTROPHILS NFR BLD: 54.6 % (ref 38–73)
NONHDLC SERPL-MCNC: 126 MG/DL
NRBC BLD-RTO: 0 /100 WBC
PLATELET # BLD AUTO: 277 K/UL (ref 150–350)
PMV BLD AUTO: 9.8 FL (ref 9.2–12.9)
PROLACTIN SERPL IA-MCNC: 8.8 NG/ML (ref 5.2–26.5)
RBC # BLD AUTO: 4.96 M/UL (ref 4–5.4)
TESTOST SERPL-MCNC: 33 NG/DL (ref 5–73)
TRIGL SERPL-MCNC: 68 MG/DL (ref 30–150)
TSH SERPL DL<=0.005 MIU/L-ACNC: 1.57 UIU/ML (ref 0.4–4)
WBC # BLD AUTO: 5.5 K/UL (ref 3.9–12.7)

## 2020-11-10 PROCEDURE — 84146 ASSAY OF PROLACTIN: CPT

## 2020-11-10 PROCEDURE — 82670 ASSAY OF TOTAL ESTRADIOL: CPT

## 2020-11-10 PROCEDURE — 99213 OFFICE O/P EST LOW 20 MIN: CPT | Mod: S$GLB,,, | Performed by: STUDENT IN AN ORGANIZED HEALTH CARE EDUCATION/TRAINING PROGRAM

## 2020-11-10 PROCEDURE — 99999 PR PBB SHADOW E&M-EST. PATIENT-LVL III: ICD-10-PCS | Mod: PBBFAC,,, | Performed by: STUDENT IN AN ORGANIZED HEALTH CARE EDUCATION/TRAINING PROGRAM

## 2020-11-10 PROCEDURE — 83498 ASY HYDROXYPROGESTERONE 17-D: CPT

## 2020-11-10 PROCEDURE — 83001 ASSAY OF GONADOTROPIN (FSH): CPT

## 2020-11-10 PROCEDURE — 36415 COLL VENOUS BLD VENIPUNCTURE: CPT

## 2020-11-10 PROCEDURE — 85025 COMPLETE CBC W/AUTO DIFF WBC: CPT

## 2020-11-10 PROCEDURE — 86803 HEPATITIS C AB TEST: CPT

## 2020-11-10 PROCEDURE — 99999 PR PBB SHADOW E&M-EST. PATIENT-LVL III: CPT | Mod: PBBFAC,,, | Performed by: STUDENT IN AN ORGANIZED HEALTH CARE EDUCATION/TRAINING PROGRAM

## 2020-11-10 PROCEDURE — 99213 PR OFFICE/OUTPT VISIT, EST, LEVL III, 20-29 MIN: ICD-10-PCS | Mod: S$GLB,,, | Performed by: STUDENT IN AN ORGANIZED HEALTH CARE EDUCATION/TRAINING PROGRAM

## 2020-11-10 PROCEDURE — 88175 CYTOPATH C/V AUTO FLUID REDO: CPT

## 2020-11-10 PROCEDURE — 80061 LIPID PANEL: CPT

## 2020-11-10 PROCEDURE — 84443 ASSAY THYROID STIM HORMONE: CPT

## 2020-11-10 PROCEDURE — 84402 ASSAY OF FREE TESTOSTERONE: CPT

## 2020-11-10 PROCEDURE — 84403 ASSAY OF TOTAL TESTOSTERONE: CPT

## 2020-11-10 NOTE — PROGRESS NOTES
Chief Complaint: Abnormal Uterine Bleeding     HPI:     Patient is a 25 y.o.  who complains of irregular periods. Symptoms have been present for 8 months and have been worsening. She reports bleeding between periods. She has tried birthcontrol pills before with  good results. Periods are every 45 days and last 5-7 days.  Does report some acne.  No excess hair growth.  Was on OCPs prior to blood clot and had improvement in cycles.  Denies any chest pain, shortness of breath, palpitations, dizziness, fatigue.  Ms. Pierre is currently sexually active with a single male partner. Patient's last menstrual period was 2020 (exact date).    OB History        1    Para   1    Term   1            AB        Living   1       SAB        TAB        Ectopic        Multiple   0    Live Births   1               History reviewed. No pertinent past medical history.  History reviewed. No pertinent surgical history.  Family History   Problem Relation Age of Onset    Hypertension Mother     Hyperlipidemia Mother     Hypertension Father     Hyperlipidemia Father     Hypertension Maternal Uncle     Hypertension Paternal Uncle     Stroke Maternal Grandfather     Diabetes Paternal Grandmother     Cancer Paternal Grandfather 80        Pancreatic     Current Outpatient Medications on File Prior to Visit   Medication Sig Dispense Refill    sertraline (ZOLOFT) 50 MG tablet Take 1 tablet (50 mg total) by mouth once daily. 30 tablet 6    desonide (DESOWEN) 0.05 % lotion Apply topically 2 (two) times daily. No longer than 2 weeks 1 Bottle 1    FINACEA 15 % gel APPLY TO FACE ONCE OR TWICE DAILY  3    fluticasone propionate (FLONASE) 50 mcg/actuation nasal spray 2 sprays (100 mcg total) by Each Nostril route once daily. 16 g 0    ibuprofen (ADVIL,MOTRIN) 600 MG tablet Take 1 tablet (600 mg total) by mouth every 6 (six) hours as needed for Pain. (Patient not taking: Reported on 11/10/2020) 20 tablet 0     No  "current facility-administered medications on file prior to visit.          ROS:     GENERAL: Denies unintentional weight gain or weight loss. Feeling well overall.   SKIN: Denies rash or lesions.   HEENT: Denies headaches, or vision changes.   CARDIOVASCULAR: Denies palpitations or chest pain.   RESPIRATORY: Denies shortness of breath or dyspnea on exertion.  BREASTS: Denies pain, lumps, or nipple discharge.   ABDOMEN: Denies abdominal pain, constipation, diarrhea, nausea, vomiting, change in appetite.  GYN:  See HPI.    URINARY: Denies frequency, dysuria, hematuria.  NEUROLOGIC: Denies syncope or weakness.   PSYCHIATRIC: Denies depression, anxiety or mood swings.    Physical Exam:      PHYSICAL EXAM:   Vitals:    11/10/20 0820   BP: 106/78   Weight: 66 kg (145 lb 9.8 oz)   Height: 5' 5" (1.651 m)   PainSc: 0-No pain     Body mass index is 24.23 kg/m².    General: No acute distress; alert and engaged.  Neck:  Supple, no thyromegaly.  Cardiovascular:  Regular rate and rhythm.    Lungs:  Clear to auscultation bilaterally.   Breast:  Symmetric, no masses, nontender, no lesions, no nipple discharge.    Abdomen: Soft, non-tender, non-distended, no masses.  Pelvic: External genitalia and urethra within normal limits. Vagina    without lesions, without discharge, without erythema, without ulcers.  Cervix without cervical motion tenderness, non-friable. Uterus normal in size and nontender. No adnexal masses or tenderness palpated.  Extremities:  No edema, nontender.     Labs:  upt neg    Assessment/Plan:     Irregular periods/menstrual cycles  -     POCT urine pregnancy  -     US Pelvis Comp with Transvag NON-OB (xpd; Future; Expected date: 11/10/2020  -     CBC Auto Differential; Future; Expected date: 11/10/2020  -     TSH; Future; Expected date: 11/10/2020  -     Prolactin; Future; Expected date: 11/10/2020  -     Testosterone; Future; Expected date: 11/10/2020  -     Testosterone, Free; Future; Expected date: " 11/10/2020  -     17-Hydroxyprogesterone; Future; Expected date: 11/10/2020  -     Glucose Tolerance 2 Hour; Future; Expected date: 11/10/2020  -     Lipid Panel; Future; Expected date: 11/10/2020  -     Follicle Stimulating Hormone; Future; Expected date: 11/10/2020  -     Estradiol; Future; Expected date: 11/10/2020    Screening for malignant neoplasm of cervix  -     Liquid-Based Pap Smear, Screening      Use of the [facility] Patient Portal discussed and encouraged during today's visit.

## 2020-11-11 LAB — HCV AB SERPL QL IA: NEGATIVE

## 2020-11-13 LAB
17OHP SERPL-MCNC: 123 NG/DL (ref 35–413)
TESTOST FREE SERPL-MCNC: 1 PG/ML

## 2020-11-16 ENCOUNTER — TELEPHONE (OUTPATIENT)
Dept: OBSTETRICS AND GYNECOLOGY | Facility: CLINIC | Age: 25
End: 2020-11-16

## 2020-11-17 ENCOUNTER — TELEPHONE (OUTPATIENT)
Dept: OBSTETRICS AND GYNECOLOGY | Facility: CLINIC | Age: 25
End: 2020-11-17

## 2020-11-17 ENCOUNTER — OFFICE VISIT (OUTPATIENT)
Dept: FAMILY MEDICINE | Facility: CLINIC | Age: 25
End: 2020-11-17
Payer: COMMERCIAL

## 2020-11-17 VITALS
TEMPERATURE: 98 F | WEIGHT: 146.63 LBS | HEART RATE: 86 BPM | RESPIRATION RATE: 16 BRPM | HEIGHT: 65 IN | DIASTOLIC BLOOD PRESSURE: 80 MMHG | BODY MASS INDEX: 24.43 KG/M2 | OXYGEN SATURATION: 97 % | SYSTOLIC BLOOD PRESSURE: 110 MMHG

## 2020-11-17 DIAGNOSIS — Z23 NEEDS FLU SHOT: ICD-10-CM

## 2020-11-17 DIAGNOSIS — Z86.718 HISTORY OF DVT OF LOWER EXTREMITY: ICD-10-CM

## 2020-11-17 DIAGNOSIS — M79.605 PAIN IN BOTH LOWER EXTREMITIES: Primary | ICD-10-CM

## 2020-11-17 DIAGNOSIS — M79.604 PAIN IN BOTH LOWER EXTREMITIES: Primary | ICD-10-CM

## 2020-11-17 DIAGNOSIS — E55.9 VITAMIN D INSUFFICIENCY: ICD-10-CM

## 2020-11-17 PROCEDURE — 99999 PR PBB SHADOW E&M-EST. PATIENT-LVL III: ICD-10-PCS | Mod: PBBFAC,,, | Performed by: INTERNAL MEDICINE

## 2020-11-17 PROCEDURE — 90471 IMMUNIZATION ADMIN: CPT | Mod: S$GLB,,, | Performed by: INTERNAL MEDICINE

## 2020-11-17 PROCEDURE — 99999 PR PBB SHADOW E&M-EST. PATIENT-LVL III: CPT | Mod: PBBFAC,,, | Performed by: INTERNAL MEDICINE

## 2020-11-17 PROCEDURE — 99214 OFFICE O/P EST MOD 30 MIN: CPT | Mod: 25,S$GLB,, | Performed by: INTERNAL MEDICINE

## 2020-11-17 PROCEDURE — 90471 FLU VACCINE (QUAD) GREATER THAN OR EQUAL TO 3YO PRESERVATIVE FREE IM: ICD-10-PCS | Mod: S$GLB,,, | Performed by: INTERNAL MEDICINE

## 2020-11-17 PROCEDURE — 90686 IIV4 VACC NO PRSV 0.5 ML IM: CPT | Mod: S$GLB,,, | Performed by: INTERNAL MEDICINE

## 2020-11-17 PROCEDURE — 90686 FLU VACCINE (QUAD) GREATER THAN OR EQUAL TO 3YO PRESERVATIVE FREE IM: ICD-10-PCS | Mod: S$GLB,,, | Performed by: INTERNAL MEDICINE

## 2020-11-17 PROCEDURE — 99214 PR OFFICE/OUTPT VISIT, EST, LEVL IV, 30-39 MIN: ICD-10-PCS | Mod: 25,S$GLB,, | Performed by: INTERNAL MEDICINE

## 2020-11-17 NOTE — PROGRESS NOTES
After obtaining consent, and per orders of Dr. Morales, injection of flu shot given by Audelia Ivan. Patient instructed to remain in clinic for 20 minutes afterwards, and to report any adverse reaction to me immediately.

## 2020-11-17 NOTE — PROGRESS NOTES
SUBJECTIVE     Chief Complaint   Patient presents with    Leg Pain     bilateral on and off        HPI  Niesha Pierre is a 25 y.o. female with multiple medical diagnoses as listed in the medical history and problem list that presents for evaluation of BLE pain x 3-4 months. Pt reports pain is at the B/L posterior knee with extension into the calves. Pain is a dull ache at a 4-5/10 intermittently in nature. She has been taking Advil and walking to relieve pain. Pain is worse when she touches her leg or is on her feet a whole bunch. Denies any edema, increased warmth to touch, or erythema. Of note, pt has a hx of a DVT and her current pain feels similar to her past DVT.     PAST MEDICAL HISTORY:  History reviewed. No pertinent past medical history.    PAST SURGICAL HISTORY:  History reviewed. No pertinent surgical history.    SOCIAL HISTORY:  Social History     Socioeconomic History    Marital status:      Spouse name: Not on file    Number of children: Not on file    Years of education: Not on file    Highest education level: Not on file   Occupational History    Not on file   Social Needs    Financial resource strain: Not on file    Food insecurity     Worry: Not on file     Inability: Not on file    Transportation needs     Medical: Not on file     Non-medical: Not on file   Tobacco Use    Smoking status: Former Smoker     Quit date: 2013     Years since quittin.6    Smokeless tobacco: Never Used   Substance and Sexual Activity    Alcohol use: Not Currently     Alcohol/week: 18.0 standard drinks     Types: 18 Cans of beer per week    Drug use: No    Sexual activity: Yes     Partners: Male     Birth control/protection: None   Lifestyle    Physical activity     Days per week: Not on file     Minutes per session: Not on file    Stress: Not on file   Relationships    Social connections     Talks on phone: Not on file     Gets together: Not on file     Attends Mandaen  service: Not on file     Active member of club or organization: Not on file     Attends meetings of clubs or organizations: Not on file     Relationship status: Not on file   Other Topics Concern    Not on file   Social History Narrative    Not on file       FAMILY HISTORY:  Family History   Problem Relation Age of Onset    Hypertension Mother     Hyperlipidemia Mother     Hypertension Father     Hyperlipidemia Father     Hypertension Maternal Uncle     Hypertension Paternal Uncle     Stroke Maternal Grandfather     Diabetes Paternal Grandmother     Cancer Paternal Grandfather 80        Pancreatic       ALLERGIES AND MEDICATIONS: updated and reviewed.  Review of patient's allergies indicates:  No Known Allergies  Current Outpatient Medications   Medication Sig Dispense Refill    ibuprofen (ADVIL,MOTRIN) 600 MG tablet Take 1 tablet (600 mg total) by mouth every 6 (six) hours as needed for Pain. 20 tablet 0    sertraline (ZOLOFT) 50 MG tablet Take 1 tablet (50 mg total) by mouth once daily. 30 tablet 6    fluticasone propionate (FLONASE) 50 mcg/actuation nasal spray 2 sprays (100 mcg total) by Each Nostril route once daily. 16 g 0     No current facility-administered medications for this visit.        ROS  Review of Systems   Constitutional: Negative for chills and fever.   HENT: Negative for hearing loss and sore throat.    Eyes: Negative for visual disturbance.   Respiratory: Negative for cough and shortness of breath.    Cardiovascular: Negative for chest pain, palpitations and leg swelling.   Gastrointestinal: Negative for abdominal pain, constipation, diarrhea, nausea and vomiting.   Genitourinary: Negative for dysuria, frequency and urgency.   Musculoskeletal: Positive for arthralgias (BLE). Negative for joint swelling and myalgias.   Skin: Negative for rash and wound.   Neurological: Negative for headaches.   Psychiatric/Behavioral: Negative for agitation and confusion. The patient is not  "nervous/anxious.          OBJECTIVE     Physical Exam  Vitals:    11/17/20 0940   BP: 110/80   Pulse: 86   Resp: 16   Temp: 98.4 °F (36.9 °C)    Body mass index is 24.78 kg/m².  Weight: 66.5 kg (146 lb 9.7 oz)   Height: 5' 4.5" (163.8 cm)     Physical Exam  Constitutional:       General: She is not in acute distress.     Appearance: She is well-developed.   HENT:      Head: Normocephalic and atraumatic.      Right Ear: External ear normal.      Left Ear: External ear normal.      Nose: Nose normal.   Eyes:      General: No scleral icterus.        Right eye: No discharge.         Left eye: No discharge.      Conjunctiva/sclera: Conjunctivae normal.   Neck:      Musculoskeletal: Normal range of motion and neck supple.      Vascular: No JVD.      Trachea: No tracheal deviation.   Cardiovascular:      Rate and Rhythm: Normal rate and regular rhythm.      Heart sounds: No murmur. No friction rub. No gallop.    Pulmonary:      Effort: Pulmonary effort is normal. No respiratory distress.      Breath sounds: Normal breath sounds. No wheezing.   Abdominal:      General: Bowel sounds are normal. There is no distension.      Palpations: Abdomen is soft. There is no mass.      Tenderness: There is no abdominal tenderness. There is no guarding or rebound.   Musculoskeletal: Normal range of motion.         General: No tenderness or deformity.   Skin:     General: Skin is warm and dry.      Findings: No erythema or rash.   Neurological:      Mental Status: She is alert and oriented to person, place, and time.      Motor: No abnormal muscle tone.      Coordination: Coordination normal.   Psychiatric:         Behavior: Behavior normal.         Thought Content: Thought content normal.         Judgment: Judgment normal.           Health Maintenance       Date Due Completion Date    HPV Vaccines (1 - 2-dose series) 01/24/2006 ---    Influenza Vaccine (1) 08/01/2020 8/23/2019    Pap Smear with HPV Cotest 06/07/2024 6/7/2019    Override " on 11/30/2016: Done (scanned in chart)    TETANUS VACCINE 10/30/2029 10/30/2019            ASSESSMENT     25 y.o. female with     1. Pain in both lower extremities    2. History of DVT of lower extremity    3. Vitamin D insufficiency    4. Needs flu shot        PLAN:     1. Pain in both lower extremities  - Unknown etiology, but will ensure no DVT and f/u in Vit D  - US Lower Extremity Veins Bilateral; Future  - Comprehensive Metabolic Panel; Future  - Magnesium; Future    2. History of DVT of lower extremity  - US Lower Extremity Veins Bilateral; Future    3. Vitamin D insufficiency  - Vitamin D; Future    4. Needs flu shot  - Influenza - Quadrivalent *Preferred* (6 months+) (PF)        RTC in 2 weeks for repeat assessment of current treatment plan       Jaquelin Morales MD  11/17/2020 9:49 AM        No follow-ups on file.

## 2020-11-20 ENCOUNTER — HOSPITAL ENCOUNTER (OUTPATIENT)
Dept: RADIOLOGY | Facility: OTHER | Age: 25
Discharge: HOME | End: 2020-11-20
Attending: INTERNAL MEDICINE
Payer: COMMERCIAL

## 2020-11-20 DIAGNOSIS — Z86.718 HISTORY OF DVT OF LOWER EXTREMITY: ICD-10-CM

## 2020-11-20 DIAGNOSIS — M79.605 PAIN IN BOTH LOWER EXTREMITIES: ICD-10-CM

## 2020-11-20 DIAGNOSIS — M79.604 PAIN IN BOTH LOWER EXTREMITIES: ICD-10-CM

## 2020-11-20 PROCEDURE — 93970 EXTREMITY STUDY: CPT | Mod: 26,,, | Performed by: RADIOLOGY

## 2020-11-20 PROCEDURE — 93970 US LOWER EXTREMITY VEINS BILATERAL: ICD-10-PCS | Mod: 26,,, | Performed by: RADIOLOGY

## 2020-11-20 PROCEDURE — 93970 EXTREMITY STUDY: CPT | Mod: TC

## 2020-12-03 ENCOUNTER — OFFICE VISIT (OUTPATIENT)
Dept: OBSTETRICS AND GYNECOLOGY | Facility: CLINIC | Age: 25
End: 2020-12-03
Attending: STUDENT IN AN ORGANIZED HEALTH CARE EDUCATION/TRAINING PROGRAM
Payer: COMMERCIAL

## 2020-12-03 VITALS — HEIGHT: 65 IN | SYSTOLIC BLOOD PRESSURE: 110 MMHG | DIASTOLIC BLOOD PRESSURE: 76 MMHG | BODY MASS INDEX: 24.78 KG/M2

## 2020-12-03 DIAGNOSIS — N93.9 ABNORMAL UTERINE BLEEDING (AUB): Primary | ICD-10-CM

## 2020-12-03 PROCEDURE — 99213 OFFICE O/P EST LOW 20 MIN: CPT | Mod: S$GLB,,, | Performed by: STUDENT IN AN ORGANIZED HEALTH CARE EDUCATION/TRAINING PROGRAM

## 2020-12-03 PROCEDURE — 99999 PR PBB SHADOW E&M-EST. PATIENT-LVL II: CPT | Mod: PBBFAC,,, | Performed by: STUDENT IN AN ORGANIZED HEALTH CARE EDUCATION/TRAINING PROGRAM

## 2020-12-03 PROCEDURE — 99213 PR OFFICE/OUTPT VISIT, EST, LEVL III, 20-29 MIN: ICD-10-PCS | Mod: S$GLB,,, | Performed by: STUDENT IN AN ORGANIZED HEALTH CARE EDUCATION/TRAINING PROGRAM

## 2020-12-03 PROCEDURE — 99999 PR PBB SHADOW E&M-EST. PATIENT-LVL II: ICD-10-PCS | Mod: PBBFAC,,, | Performed by: STUDENT IN AN ORGANIZED HEALTH CARE EDUCATION/TRAINING PROGRAM

## 2020-12-03 NOTE — PROGRESS NOTES
Chief Complaint: U/S Results     HPI:      Niesha Pierre is a 25 y.o.  who presents today for follow up of U/S results.  LMP: Patient's last menstrual period was 2020 (exact date).  Doing well.  Some mid cycle spotting.  Cycles coming every 35-40 days.  Desires pregnancy.  No other issues, problems, or complaints.     OB History        1    Para   1    Term   1            AB        Living   1       SAB        TAB        Ectopic        Multiple   0    Live Births   1               History reviewed. No pertinent past medical history.  History reviewed. No pertinent surgical history.  Social History     Socioeconomic History    Marital status:      Spouse name: Not on file    Number of children: Not on file    Years of education: Not on file    Highest education level: Not on file   Occupational History    Not on file   Social Needs    Financial resource strain: Not on file    Food insecurity     Worry: Not on file     Inability: Not on file    Transportation needs     Medical: Not on file     Non-medical: Not on file   Tobacco Use    Smoking status: Former Smoker     Quit date: 2013     Years since quittin.6    Smokeless tobacco: Never Used   Substance and Sexual Activity    Alcohol use: Not Currently     Alcohol/week: 18.0 standard drinks     Types: 18 Cans of beer per week    Drug use: No    Sexual activity: Yes     Partners: Male     Birth control/protection: None   Lifestyle    Physical activity     Days per week: Not on file     Minutes per session: Not on file    Stress: Not on file   Relationships    Social connections     Talks on phone: Not on file     Gets together: Not on file     Attends Jain service: Not on file     Active member of club or organization: Not on file     Attends meetings of clubs or organizations: Not on file     Relationship status: Not on file   Other Topics Concern    Not on file   Social History Narrative     "Not on file     Family History   Problem Relation Age of Onset    Hypertension Mother     Hyperlipidemia Mother     Hypertension Father     Hyperlipidemia Father     Hypertension Maternal Uncle     Hypertension Paternal Uncle     Stroke Maternal Grandfather     Diabetes Paternal Grandmother     Cancer Paternal Grandfather 80        Pancreatic       Current Outpatient Medications:     ibuprofen (ADVIL,MOTRIN) 600 MG tablet, Take 1 tablet (600 mg total) by mouth every 6 (six) hours as needed for Pain., Disp: 20 tablet, Rfl: 0    sertraline (ZOLOFT) 50 MG tablet, Take 1 tablet (50 mg total) by mouth once daily., Disp: 30 tablet, Rfl: 6    fluticasone propionate (FLONASE) 50 mcg/actuation nasal spray, 2 sprays (100 mcg total) by Each Nostril route once daily., Disp: 16 g, Rfl: 0    ROS:     GENERAL: Denies unintentional weight gain or weight loss. Feeling well overall.   SKIN: Denies rash or lesions.   HEENT: Denies headaches, or vision changes.   ABDOMEN: Denies abdominal pain, constipation, diarrhea, nausea, vomiting, change in appetite.  URINARY: Denies frequency, dysuria, hematuria.  NEUROLOGIC: Denies syncope or weakness.   PSYCHIATRIC: Denies depression, anxiety or mood swings.    Physical Exam:      PHYSICAL EXAM:  /76   Ht 5' 4.5" (1.638 m)   LMP 2020 (Exact Date)   BMI 24.78 kg/m²   Body mass index is 24.78 kg/m².     APPEARANCE: Well nourished, well developed, in no acute distress.  PSYCH: Appropriate mood and affect.  SKIN: No acne or hirsutism.      Assessment/Plan:     25 y.o.     Abnormal uterine bleeding (AUB)          Counselin.  AUB:  U/S results reviewed with patient.  Lab results reviewed.  Reviewed treatment options.  She desires pregnancy.  Will start PNV.  Also discussed time intercourse and OPK monitoring.  She will call if she wants to do additional monitoring.  All questions answered.  2.  Follow up with PCP for other health maintenance.  3.  RTC in 6 " months for annual exam or sooner if needed.    Use of the [employer] Patient Portal discussed and encouraged during today's visit.

## 2020-12-07 PROBLEM — Z3A.38 38 WEEKS GESTATION OF PREGNANCY: Status: RESOLVED | Noted: 2019-12-27 | Resolved: 2020-12-07

## 2020-12-22 LAB
FINAL PATHOLOGIC DIAGNOSIS: NORMAL
Lab: NORMAL

## 2021-01-04 ENCOUNTER — PATIENT MESSAGE (OUTPATIENT)
Dept: OBSTETRICS AND GYNECOLOGY | Facility: CLINIC | Age: 26
End: 2021-01-04

## 2021-01-25 ENCOUNTER — PATIENT MESSAGE (OUTPATIENT)
Dept: OBSTETRICS AND GYNECOLOGY | Facility: CLINIC | Age: 26
End: 2021-01-25

## 2021-01-27 ENCOUNTER — OFFICE VISIT (OUTPATIENT)
Dept: URGENT CARE | Facility: CLINIC | Age: 26
End: 2021-01-27
Payer: COMMERCIAL

## 2021-01-27 VITALS
HEART RATE: 139 BPM | TEMPERATURE: 100 F | BODY MASS INDEX: 24.32 KG/M2 | WEIGHT: 146 LBS | RESPIRATION RATE: 16 BRPM | SYSTOLIC BLOOD PRESSURE: 104 MMHG | OXYGEN SATURATION: 97 % | HEIGHT: 65 IN | DIASTOLIC BLOOD PRESSURE: 64 MMHG

## 2021-01-27 DIAGNOSIS — J03.90 EXUDATIVE TONSILLITIS: ICD-10-CM

## 2021-01-27 DIAGNOSIS — R50.9 FEVER, UNSPECIFIED FEVER CAUSE: Primary | ICD-10-CM

## 2021-01-27 LAB
CTP QC/QA: YES
CTP QC/QA: YES
POC MOLECULAR INFLUENZA A AGN: NEGATIVE
POC MOLECULAR INFLUENZA B AGN: NEGATIVE
SARS-COV-2 RDRP RESP QL NAA+PROBE: NEGATIVE

## 2021-01-27 PROCEDURE — U0002: ICD-10-PCS | Mod: QW,S$GLB,, | Performed by: PHYSICIAN ASSISTANT

## 2021-01-27 PROCEDURE — 87502 POCT INFLUENZA A/B MOLECULAR: ICD-10-PCS | Mod: QW,S$GLB,, | Performed by: PHYSICIAN ASSISTANT

## 2021-01-27 PROCEDURE — 99204 OFFICE O/P NEW MOD 45 MIN: CPT | Mod: 25,S$GLB,, | Performed by: PHYSICIAN ASSISTANT

## 2021-01-27 PROCEDURE — U0002 COVID-19 LAB TEST NON-CDC: HCPCS | Mod: QW,S$GLB,, | Performed by: PHYSICIAN ASSISTANT

## 2021-01-27 PROCEDURE — 99204 PR OFFICE/OUTPT VISIT, NEW, LEVL IV, 45-59 MIN: ICD-10-PCS | Mod: 25,S$GLB,, | Performed by: PHYSICIAN ASSISTANT

## 2021-01-27 PROCEDURE — 87502 INFLUENZA DNA AMP PROBE: CPT | Mod: QW,S$GLB,, | Performed by: PHYSICIAN ASSISTANT

## 2021-01-27 RX ORDER — AMOXICILLIN AND CLAVULANATE POTASSIUM 875; 125 MG/1; MG/1
1 TABLET, FILM COATED ORAL EVERY 12 HOURS
Qty: 20 TABLET | Refills: 0 | Status: SHIPPED | OUTPATIENT
Start: 2021-01-27 | End: 2021-05-27

## 2021-01-27 RX ORDER — ACETAMINOPHEN 500 MG
1000 TABLET ORAL
Status: COMPLETED | OUTPATIENT
Start: 2021-01-27 | End: 2021-01-27

## 2021-01-27 RX ADMIN — Medication 1000 MG: at 09:01

## 2021-02-12 ENCOUNTER — PATIENT MESSAGE (OUTPATIENT)
Dept: FAMILY MEDICINE | Facility: CLINIC | Age: 26
End: 2021-02-12

## 2021-02-12 DIAGNOSIS — B37.9 YEAST INFECTION: Primary | ICD-10-CM

## 2021-02-12 RX ORDER — FLUCONAZOLE 150 MG/1
150 TABLET ORAL DAILY
Qty: 1 TABLET | Refills: 0 | Status: SHIPPED | OUTPATIENT
Start: 2021-02-12 | End: 2021-02-13

## 2021-02-21 ENCOUNTER — PATIENT MESSAGE (OUTPATIENT)
Dept: FAMILY MEDICINE | Facility: CLINIC | Age: 26
End: 2021-02-21

## 2021-03-27 NOTE — PROGRESS NOTES
Subjective:      Niesha Pierre is a 24 y.o.  who presents for a postpartum visit.  She is status post  uncomplicated vaginal delivery earlier this week.  Overall doing well but is feeling overwhelmed.  Went home yesterday.  Took baby to pediatrician and everything is going well.  She is breastfeeding and bottle feeding.  Has a history of anxiety but has not required medication previously.  Vaginal bleeding scant.  No other issues or concerns.     History reviewed. No pertinent past medical history.    Current Outpatient Medications:     enoxaparin (LOVENOX) 40 mg/0.4 mL Syrg, Inject 0.4 mLs (40 mg total) into the skin once daily., Disp: 12 mL, Rfl: 1    prenatal 25/iron fum/folic/dha (PRENATAL-1 ORAL), Take by mouth., Disp: , Rfl:     desonide (DESOWEN) 0.05 % lotion, Apply topically 2 (two) times daily. No longer than 2 weeks, Disp: 1 Bottle, Rfl: 1    docusate sodium (COLACE) 100 MG capsule, Take 2 capsules (200 mg total) by mouth 2 (two) times daily., Disp: 60 capsule, Rfl: 2    FINACEA 15 % gel, APPLY TO FACE ONCE OR TWICE DAILY, Disp: , Rfl: 3    ibuprofen (ADVIL,MOTRIN) 600 MG tablet, Take 1 tablet (600 mg total) by mouth every 6 (six) hours as needed (cramping)., Disp: 60 tablet, Rfl: 2    ondansetron (ZOFRAN) 4 MG tablet, Take 1 tablet (4 mg total) by mouth every 6 (six) hours as needed for Nausea., Disp: 20 tablet, Rfl: 0    promethazine (PHENERGAN) 25 MG tablet, Take 0.5 tablets (12.5 mg total) by mouth every 6 (six) hours as needed for Nausea., Disp: 20 tablet, Rfl: 0    pyridoxine HCl, vitamin B6, (VITAMIN B-6 ORAL), Take by mouth., Disp: , Rfl:     sertraline (ZOLOFT) 50 MG tablet, Take 1 tablet (50 mg total) by mouth once daily., Disp: 30 tablet, Rfl: 11      Objective:     Vitals:    20 1516   BP: 102/72       APPEARANCE: Well nourished, well developed, in no acute distress.  PSYCH: Appropriate mood and affect.  NECK:  Supple, no thyromegaly.  BREASTS:  No masses,  no nipple discharge.  CARDIOVASCULAR:  Regular rate and rhythm.  LUNGS:  Clear to auscultation bilaterally.  ABDOMEN:  Soft, nontender.  GENITOURINARY:  deferred.    EXTREMITIES: No edema.      Assessment:     Postpartum care and examination    Other orders  -     sertraline (ZOLOFT) 50 MG tablet; Take 1 tablet (50 mg total) by mouth once daily.  Dispense: 30 tablet; Refill: 11        Plan:     1. Postpartum course reviewed and discussed with patient.  All questions answered.  Reviewed postpartum depression and reassurance offered to patient.  Discussed counseling and antidepressant treatment.  Patient would like to start medication.  R/B/A reviewed and all questions answered.  Also discussed possible side effects.  She will call with any issues or concerns.  Return to clinic in 1 week for follow up.     Fall with Harm Risk

## 2021-05-04 ENCOUNTER — PATIENT MESSAGE (OUTPATIENT)
Dept: RESEARCH | Facility: HOSPITAL | Age: 26
End: 2021-05-04

## 2021-05-10 ENCOUNTER — PATIENT MESSAGE (OUTPATIENT)
Dept: RESEARCH | Facility: HOSPITAL | Age: 26
End: 2021-05-10

## 2021-05-27 ENCOUNTER — OFFICE VISIT (OUTPATIENT)
Dept: OBSTETRICS AND GYNECOLOGY | Facility: CLINIC | Age: 26
End: 2021-05-27
Attending: STUDENT IN AN ORGANIZED HEALTH CARE EDUCATION/TRAINING PROGRAM
Payer: COMMERCIAL

## 2021-05-27 VITALS
WEIGHT: 149.94 LBS | DIASTOLIC BLOOD PRESSURE: 68 MMHG | SYSTOLIC BLOOD PRESSURE: 108 MMHG | BODY MASS INDEX: 24.98 KG/M2 | HEIGHT: 65 IN

## 2021-05-27 DIAGNOSIS — Z31.9 PATIENT DESIRES PREGNANCY: Primary | ICD-10-CM

## 2021-05-27 DIAGNOSIS — Z86.718 HISTORY OF DVT (DEEP VEIN THROMBOSIS): ICD-10-CM

## 2021-05-27 PROCEDURE — 99999 PR PBB SHADOW E&M-EST. PATIENT-LVL III: ICD-10-PCS | Mod: PBBFAC,,, | Performed by: STUDENT IN AN ORGANIZED HEALTH CARE EDUCATION/TRAINING PROGRAM

## 2021-05-27 PROCEDURE — 99999 PR PBB SHADOW E&M-EST. PATIENT-LVL III: CPT | Mod: PBBFAC,,, | Performed by: STUDENT IN AN ORGANIZED HEALTH CARE EDUCATION/TRAINING PROGRAM

## 2021-05-27 PROCEDURE — 99213 OFFICE O/P EST LOW 20 MIN: CPT | Mod: S$GLB,,, | Performed by: STUDENT IN AN ORGANIZED HEALTH CARE EDUCATION/TRAINING PROGRAM

## 2021-05-27 PROCEDURE — 99213 PR OFFICE/OUTPT VISIT, EST, LEVL III, 20-29 MIN: ICD-10-PCS | Mod: S$GLB,,, | Performed by: STUDENT IN AN ORGANIZED HEALTH CARE EDUCATION/TRAINING PROGRAM

## 2021-06-24 ENCOUNTER — LAB VISIT (OUTPATIENT)
Dept: LAB | Facility: HOSPITAL | Age: 26
End: 2021-06-24
Attending: STUDENT IN AN ORGANIZED HEALTH CARE EDUCATION/TRAINING PROGRAM
Payer: COMMERCIAL

## 2021-06-24 DIAGNOSIS — Z31.9 PATIENT DESIRES PREGNANCY: ICD-10-CM

## 2021-06-24 LAB — PROGEST SERPL-MCNC: 8 NG/ML

## 2021-06-24 PROCEDURE — 83520 IMMUNOASSAY QUANT NOS NONAB: CPT | Performed by: STUDENT IN AN ORGANIZED HEALTH CARE EDUCATION/TRAINING PROGRAM

## 2021-06-24 PROCEDURE — 84144 ASSAY OF PROGESTERONE: CPT | Performed by: STUDENT IN AN ORGANIZED HEALTH CARE EDUCATION/TRAINING PROGRAM

## 2021-06-26 LAB — MIS SERPL-MCNC: 5.3 NG/ML (ref 0.89–9.9)

## 2021-06-29 ENCOUNTER — TELEPHONE (OUTPATIENT)
Dept: OBSTETRICS AND GYNECOLOGY | Facility: CLINIC | Age: 26
End: 2021-06-29

## 2021-07-02 ENCOUNTER — TELEPHONE (OUTPATIENT)
Dept: OBSTETRICS AND GYNECOLOGY | Facility: CLINIC | Age: 26
End: 2021-07-02

## 2021-07-13 ENCOUNTER — OFFICE VISIT (OUTPATIENT)
Dept: MATERNAL FETAL MEDICINE | Facility: CLINIC | Age: 26
End: 2021-07-13
Attending: STUDENT IN AN ORGANIZED HEALTH CARE EDUCATION/TRAINING PROGRAM
Payer: COMMERCIAL

## 2021-07-13 ENCOUNTER — PATIENT MESSAGE (OUTPATIENT)
Dept: OBSTETRICS AND GYNECOLOGY | Facility: CLINIC | Age: 26
End: 2021-07-13

## 2021-07-13 DIAGNOSIS — Z86.718 HISTORY OF DVT (DEEP VEIN THROMBOSIS): ICD-10-CM

## 2021-07-13 DIAGNOSIS — Z86.718 HISTORY OF DVT OF LOWER EXTREMITY: Primary | ICD-10-CM

## 2021-07-13 DIAGNOSIS — Z31.9 PATIENT DESIRES PREGNANCY: ICD-10-CM

## 2021-07-13 PROBLEM — N61.0 MASTITIS: Status: RESOLVED | Noted: 2020-01-16 | Resolved: 2021-07-13

## 2021-07-13 PROCEDURE — 99214 PR OFFICE/OUTPT VISIT, EST, LEVL IV, 30-39 MIN: ICD-10-PCS | Mod: 95,,, | Performed by: OBSTETRICS & GYNECOLOGY

## 2021-07-13 PROCEDURE — 99214 OFFICE O/P EST MOD 30 MIN: CPT | Mod: 95,,, | Performed by: OBSTETRICS & GYNECOLOGY

## 2021-07-13 RX ORDER — LETROZOLE 2.5 MG/1
TABLET, FILM COATED ORAL
Qty: 5 TABLET | Refills: 0 | Status: SHIPPED | OUTPATIENT
Start: 2021-07-13 | End: 2021-09-07

## 2021-07-18 ENCOUNTER — PATIENT MESSAGE (OUTPATIENT)
Dept: FAMILY MEDICINE | Facility: CLINIC | Age: 26
End: 2021-07-18

## 2021-08-04 ENCOUNTER — TELEPHONE (OUTPATIENT)
Dept: OBSTETRICS AND GYNECOLOGY | Facility: CLINIC | Age: 26
End: 2021-08-04

## 2021-08-04 DIAGNOSIS — Z32.00 POSSIBLE PREGNANCY, NOT CONFIRMED: Primary | ICD-10-CM

## 2021-08-05 ENCOUNTER — LAB VISIT (OUTPATIENT)
Dept: LAB | Facility: HOSPITAL | Age: 26
End: 2021-08-05
Attending: INTERNAL MEDICINE
Payer: COMMERCIAL

## 2021-08-05 DIAGNOSIS — Z32.00 POSSIBLE PREGNANCY, NOT CONFIRMED: ICD-10-CM

## 2021-08-05 LAB — HCG INTACT+B SERPL-ACNC: 23 MIU/ML

## 2021-08-05 PROCEDURE — 84702 CHORIONIC GONADOTROPIN TEST: CPT | Performed by: NURSE PRACTITIONER

## 2021-08-06 ENCOUNTER — TELEPHONE (OUTPATIENT)
Dept: OBSTETRICS AND GYNECOLOGY | Facility: CLINIC | Age: 26
End: 2021-08-06

## 2021-08-06 DIAGNOSIS — Z86.718 PERSONAL HISTORY OF DVT (DEEP VEIN THROMBOSIS): Primary | ICD-10-CM

## 2021-08-06 DIAGNOSIS — Z34.90 EARLY STAGE OF PREGNANCY: ICD-10-CM

## 2021-08-06 RX ORDER — ENOXAPARIN SODIUM 100 MG/ML
40 INJECTION SUBCUTANEOUS DAILY
Qty: 12 ML | Refills: 11 | Status: SHIPPED | OUTPATIENT
Start: 2021-08-06 | End: 2021-09-01 | Stop reason: SDUPTHER

## 2021-08-09 ENCOUNTER — TELEPHONE (OUTPATIENT)
Dept: OBSTETRICS AND GYNECOLOGY | Facility: CLINIC | Age: 26
End: 2021-08-09

## 2021-08-09 DIAGNOSIS — Z32.01 POSITIVE PREGNANCY TEST: Primary | ICD-10-CM

## 2021-08-10 ENCOUNTER — LAB VISIT (OUTPATIENT)
Dept: LAB | Facility: HOSPITAL | Age: 26
End: 2021-08-10
Attending: STUDENT IN AN ORGANIZED HEALTH CARE EDUCATION/TRAINING PROGRAM
Payer: COMMERCIAL

## 2021-08-10 DIAGNOSIS — Z32.01 POSITIVE PREGNANCY TEST: ICD-10-CM

## 2021-08-10 LAB
HCG INTACT+B SERPL-ACNC: 268 MIU/ML
PROGEST SERPL-MCNC: 15.5 NG/ML

## 2021-08-10 PROCEDURE — 84702 CHORIONIC GONADOTROPIN TEST: CPT | Performed by: STUDENT IN AN ORGANIZED HEALTH CARE EDUCATION/TRAINING PROGRAM

## 2021-08-10 PROCEDURE — 84144 ASSAY OF PROGESTERONE: CPT | Performed by: STUDENT IN AN ORGANIZED HEALTH CARE EDUCATION/TRAINING PROGRAM

## 2021-08-11 ENCOUNTER — PATIENT MESSAGE (OUTPATIENT)
Dept: OBSTETRICS AND GYNECOLOGY | Facility: CLINIC | Age: 26
End: 2021-08-11

## 2021-08-11 RX ORDER — PROGESTERONE 100 MG/1
200 CAPSULE ORAL DAILY
Qty: 60 CAPSULE | Refills: 4 | Status: SHIPPED | OUTPATIENT
Start: 2021-08-11 | End: 2021-09-15 | Stop reason: SDUPTHER

## 2021-08-16 ENCOUNTER — PATIENT MESSAGE (OUTPATIENT)
Dept: FAMILY MEDICINE | Facility: CLINIC | Age: 26
End: 2021-08-16

## 2021-08-18 ENCOUNTER — PATIENT MESSAGE (OUTPATIENT)
Dept: OBSTETRICS AND GYNECOLOGY | Facility: CLINIC | Age: 26
End: 2021-08-18

## 2021-08-26 ENCOUNTER — TELEPHONE (OUTPATIENT)
Dept: OBSTETRICS AND GYNECOLOGY | Facility: CLINIC | Age: 26
End: 2021-08-26

## 2021-08-26 DIAGNOSIS — R11.0 NAUSEA: Primary | ICD-10-CM

## 2021-08-26 RX ORDER — ONDANSETRON 4 MG/1
4 TABLET, ORALLY DISINTEGRATING ORAL EVERY 8 HOURS PRN
Qty: 30 TABLET | Refills: 2 | Status: SHIPPED | OUTPATIENT
Start: 2021-08-26 | End: 2021-09-15 | Stop reason: SDUPTHER

## 2021-09-01 DIAGNOSIS — Z86.718 PERSONAL HISTORY OF DVT (DEEP VEIN THROMBOSIS): ICD-10-CM

## 2021-09-01 DIAGNOSIS — Z34.90 EARLY STAGE OF PREGNANCY: ICD-10-CM

## 2021-09-01 RX ORDER — ENOXAPARIN SODIUM 100 MG/ML
40 INJECTION SUBCUTANEOUS DAILY
Qty: 12 ML | Refills: 1 | Status: SHIPPED | OUTPATIENT
Start: 2021-09-01 | End: 2021-09-15 | Stop reason: SDUPTHER

## 2021-09-07 ENCOUNTER — TELEPHONE (OUTPATIENT)
Dept: OBSTETRICS AND GYNECOLOGY | Facility: CLINIC | Age: 26
End: 2021-09-07

## 2021-09-07 ENCOUNTER — OFFICE VISIT (OUTPATIENT)
Dept: OBSTETRICS AND GYNECOLOGY | Facility: CLINIC | Age: 26
End: 2021-09-07
Attending: STUDENT IN AN ORGANIZED HEALTH CARE EDUCATION/TRAINING PROGRAM
Payer: COMMERCIAL

## 2021-09-07 ENCOUNTER — PATIENT MESSAGE (OUTPATIENT)
Dept: OBSTETRICS AND GYNECOLOGY | Facility: CLINIC | Age: 26
End: 2021-09-07

## 2021-09-07 VITALS
WEIGHT: 148.56 LBS | SYSTOLIC BLOOD PRESSURE: 110 MMHG | DIASTOLIC BLOOD PRESSURE: 74 MMHG | BODY MASS INDEX: 24.73 KG/M2

## 2021-09-07 DIAGNOSIS — Z3A.08 8 WEEKS GESTATION OF PREGNANCY: Primary | ICD-10-CM

## 2021-09-07 DIAGNOSIS — Z32.01 POSITIVE PREGNANCY TEST: ICD-10-CM

## 2021-09-07 LAB
C TRACH DNA SPEC QL NAA+PROBE: NOT DETECTED
N GONORRHOEA DNA SPEC QL NAA+PROBE: NOT DETECTED

## 2021-09-07 PROCEDURE — 87491 CHLMYD TRACH DNA AMP PROBE: CPT | Performed by: STUDENT IN AN ORGANIZED HEALTH CARE EDUCATION/TRAINING PROGRAM

## 2021-09-07 PROCEDURE — 87186 SC STD MICRODIL/AGAR DIL: CPT | Performed by: STUDENT IN AN ORGANIZED HEALTH CARE EDUCATION/TRAINING PROGRAM

## 2021-09-07 PROCEDURE — 87086 URINE CULTURE/COLONY COUNT: CPT | Performed by: STUDENT IN AN ORGANIZED HEALTH CARE EDUCATION/TRAINING PROGRAM

## 2021-09-07 PROCEDURE — 99213 PR OFFICE/OUTPT VISIT, EST, LEVL III, 20-29 MIN: ICD-10-PCS | Mod: S$GLB,,, | Performed by: STUDENT IN AN ORGANIZED HEALTH CARE EDUCATION/TRAINING PROGRAM

## 2021-09-07 PROCEDURE — 99999 PR PBB SHADOW E&M-EST. PATIENT-LVL II: CPT | Mod: PBBFAC,,, | Performed by: STUDENT IN AN ORGANIZED HEALTH CARE EDUCATION/TRAINING PROGRAM

## 2021-09-07 PROCEDURE — 76801 PR US, OB <14WKS, TRANSABD, SINGLE GESTATION: ICD-10-PCS | Mod: S$GLB,,, | Performed by: OBSTETRICS & GYNECOLOGY

## 2021-09-07 PROCEDURE — 99213 OFFICE O/P EST LOW 20 MIN: CPT | Mod: S$GLB,,, | Performed by: STUDENT IN AN ORGANIZED HEALTH CARE EDUCATION/TRAINING PROGRAM

## 2021-09-07 PROCEDURE — 87088 URINE BACTERIA CULTURE: CPT | Performed by: STUDENT IN AN ORGANIZED HEALTH CARE EDUCATION/TRAINING PROGRAM

## 2021-09-07 PROCEDURE — 87591 N.GONORRHOEAE DNA AMP PROB: CPT | Performed by: STUDENT IN AN ORGANIZED HEALTH CARE EDUCATION/TRAINING PROGRAM

## 2021-09-07 PROCEDURE — 76801 OB US < 14 WKS SINGLE FETUS: CPT | Mod: S$GLB,,, | Performed by: OBSTETRICS & GYNECOLOGY

## 2021-09-07 PROCEDURE — 99999 PR PBB SHADOW E&M-EST. PATIENT-LVL II: ICD-10-PCS | Mod: PBBFAC,,, | Performed by: STUDENT IN AN ORGANIZED HEALTH CARE EDUCATION/TRAINING PROGRAM

## 2021-09-07 PROCEDURE — 87077 CULTURE AEROBIC IDENTIFY: CPT | Performed by: STUDENT IN AN ORGANIZED HEALTH CARE EDUCATION/TRAINING PROGRAM

## 2021-09-10 LAB — BACTERIA UR CULT: ABNORMAL

## 2021-09-13 ENCOUNTER — PATIENT MESSAGE (OUTPATIENT)
Dept: OBSTETRICS AND GYNECOLOGY | Facility: CLINIC | Age: 26
End: 2021-09-13

## 2021-09-13 RX ORDER — AMPICILLIN 500 MG/1
500 CAPSULE ORAL EVERY 6 HOURS
Qty: 28 CAPSULE | Refills: 0 | Status: SHIPPED | OUTPATIENT
Start: 2021-09-13 | End: 2021-09-15 | Stop reason: SDUPTHER

## 2021-09-15 DIAGNOSIS — R11.0 NAUSEA: ICD-10-CM

## 2021-09-15 DIAGNOSIS — Z34.90 EARLY STAGE OF PREGNANCY: ICD-10-CM

## 2021-09-15 DIAGNOSIS — Z86.718 PERSONAL HISTORY OF DVT (DEEP VEIN THROMBOSIS): ICD-10-CM

## 2021-09-16 RX ORDER — PROGESTERONE 100 MG/1
200 CAPSULE ORAL DAILY
Qty: 60 CAPSULE | Refills: 4 | Status: SHIPPED | OUTPATIENT
Start: 2021-09-16 | End: 2022-02-03

## 2021-09-16 RX ORDER — ENOXAPARIN SODIUM 100 MG/ML
40 INJECTION SUBCUTANEOUS DAILY
Qty: 12 ML | Refills: 1 | Status: SHIPPED | OUTPATIENT
Start: 2021-09-16 | End: 2021-10-16

## 2021-09-16 RX ORDER — ONDANSETRON 4 MG/1
4 TABLET, ORALLY DISINTEGRATING ORAL EVERY 8 HOURS PRN
Qty: 30 TABLET | Refills: 2 | Status: SHIPPED | OUTPATIENT
Start: 2021-09-16 | End: 2023-08-22

## 2021-09-16 RX ORDER — AMPICILLIN 500 MG/1
500 CAPSULE ORAL EVERY 6 HOURS
Qty: 28 CAPSULE | Refills: 0 | Status: SHIPPED | OUTPATIENT
Start: 2021-09-16 | End: 2021-09-23

## 2021-09-28 ENCOUNTER — INITIAL PRENATAL (OUTPATIENT)
Dept: OBSTETRICS AND GYNECOLOGY | Facility: CLINIC | Age: 26
End: 2021-09-28
Attending: STUDENT IN AN ORGANIZED HEALTH CARE EDUCATION/TRAINING PROGRAM
Payer: COMMERCIAL

## 2021-09-28 VITALS
DIASTOLIC BLOOD PRESSURE: 68 MMHG | BODY MASS INDEX: 24.86 KG/M2 | WEIGHT: 149.38 LBS | SYSTOLIC BLOOD PRESSURE: 104 MMHG

## 2021-09-28 DIAGNOSIS — Z36.9 ANTENATAL SCREENING ENCOUNTER: Primary | ICD-10-CM

## 2021-09-28 PROCEDURE — 87591 N.GONORRHOEAE DNA AMP PROB: CPT | Performed by: STUDENT IN AN ORGANIZED HEALTH CARE EDUCATION/TRAINING PROGRAM

## 2021-09-28 PROCEDURE — 87491 CHLMYD TRACH DNA AMP PROBE: CPT | Performed by: STUDENT IN AN ORGANIZED HEALTH CARE EDUCATION/TRAINING PROGRAM

## 2021-09-28 PROCEDURE — 0502F PR SUBSEQUENT PRENATAL CARE: ICD-10-PCS | Mod: CPTII,S$GLB,, | Performed by: STUDENT IN AN ORGANIZED HEALTH CARE EDUCATION/TRAINING PROGRAM

## 2021-09-28 PROCEDURE — 0502F SUBSEQUENT PRENATAL CARE: CPT | Mod: CPTII,S$GLB,, | Performed by: STUDENT IN AN ORGANIZED HEALTH CARE EDUCATION/TRAINING PROGRAM

## 2021-09-28 PROCEDURE — 87086 URINE CULTURE/COLONY COUNT: CPT | Performed by: STUDENT IN AN ORGANIZED HEALTH CARE EDUCATION/TRAINING PROGRAM

## 2021-09-28 PROCEDURE — 99999 PR PBB SHADOW E&M-EST. PATIENT-LVL II: ICD-10-PCS | Mod: PBBFAC,,, | Performed by: STUDENT IN AN ORGANIZED HEALTH CARE EDUCATION/TRAINING PROGRAM

## 2021-09-28 PROCEDURE — 99999 PR PBB SHADOW E&M-EST. PATIENT-LVL II: CPT | Mod: PBBFAC,,, | Performed by: STUDENT IN AN ORGANIZED HEALTH CARE EDUCATION/TRAINING PROGRAM

## 2021-09-29 LAB
C TRACH DNA SPEC QL NAA+PROBE: NOT DETECTED
N GONORRHOEA DNA SPEC QL NAA+PROBE: NOT DETECTED

## 2021-10-01 LAB
BACTERIA UR CULT: NORMAL
BACTERIA UR CULT: NORMAL

## 2021-10-05 ENCOUNTER — PATIENT MESSAGE (OUTPATIENT)
Dept: ADMINISTRATIVE | Facility: OTHER | Age: 26
End: 2021-10-05

## 2021-10-07 ENCOUNTER — PATIENT MESSAGE (OUTPATIENT)
Dept: OBSTETRICS AND GYNECOLOGY | Facility: CLINIC | Age: 26
End: 2021-10-07

## 2021-10-07 DIAGNOSIS — R39.89 SUSPECTED UTI: Primary | ICD-10-CM

## 2021-10-07 RX ORDER — AMPICILLIN 500 MG/1
500 CAPSULE ORAL EVERY 6 HOURS
Qty: 28 CAPSULE | Refills: 0 | Status: CANCELLED | OUTPATIENT
Start: 2021-10-07 | End: 2021-10-14

## 2021-11-04 ENCOUNTER — ROUTINE PRENATAL (OUTPATIENT)
Dept: OBSTETRICS AND GYNECOLOGY | Facility: CLINIC | Age: 26
End: 2021-11-04
Attending: STUDENT IN AN ORGANIZED HEALTH CARE EDUCATION/TRAINING PROGRAM
Payer: COMMERCIAL

## 2021-11-04 ENCOUNTER — LAB VISIT (OUTPATIENT)
Dept: LAB | Facility: HOSPITAL | Age: 26
End: 2021-11-04
Attending: STUDENT IN AN ORGANIZED HEALTH CARE EDUCATION/TRAINING PROGRAM
Payer: COMMERCIAL

## 2021-11-04 VITALS
WEIGHT: 143.44 LBS | DIASTOLIC BLOOD PRESSURE: 62 MMHG | BODY MASS INDEX: 23.86 KG/M2 | SYSTOLIC BLOOD PRESSURE: 118 MMHG

## 2021-11-04 DIAGNOSIS — Z3A.16 16 WEEKS GESTATION OF PREGNANCY: Primary | ICD-10-CM

## 2021-11-04 DIAGNOSIS — Z36.9 ANTENATAL SCREENING ENCOUNTER: ICD-10-CM

## 2021-11-04 LAB
BASOPHILS # BLD AUTO: 0.02 K/UL (ref 0–0.2)
BASOPHILS NFR BLD: 0.3 % (ref 0–1.9)
DIFFERENTIAL METHOD: ABNORMAL
EOSINOPHIL # BLD AUTO: 0.1 K/UL (ref 0–0.5)
EOSINOPHIL NFR BLD: 0.7 % (ref 0–8)
ERYTHROCYTE [DISTWIDTH] IN BLOOD BY AUTOMATED COUNT: 13.1 % (ref 11.5–14.5)
HCT VFR BLD AUTO: 36 % (ref 37–48.5)
HGB BLD-MCNC: 12.2 G/DL (ref 12–16)
IMM GRANULOCYTES # BLD AUTO: 0.02 K/UL (ref 0–0.04)
IMM GRANULOCYTES NFR BLD AUTO: 0.3 % (ref 0–0.5)
LYMPHOCYTES # BLD AUTO: 1.4 K/UL (ref 1–4.8)
LYMPHOCYTES NFR BLD: 18.1 % (ref 18–48)
MCH RBC QN AUTO: 29.4 PG (ref 27–31)
MCHC RBC AUTO-ENTMCNC: 33.9 G/DL (ref 32–36)
MCV RBC AUTO: 87 FL (ref 82–98)
MONOCYTES # BLD AUTO: 0.4 K/UL (ref 0.3–1)
MONOCYTES NFR BLD: 5 % (ref 4–15)
NEUTROPHILS # BLD AUTO: 5.7 K/UL (ref 1.8–7.7)
NEUTROPHILS NFR BLD: 75.6 % (ref 38–73)
NRBC BLD-RTO: 0 /100 WBC
PLATELET # BLD AUTO: 218 K/UL (ref 150–450)
PMV BLD AUTO: 10.8 FL (ref 9.2–12.9)
RBC # BLD AUTO: 4.15 M/UL (ref 4–5.4)
WBC # BLD AUTO: 7.53 K/UL (ref 3.9–12.7)

## 2021-11-04 PROCEDURE — 0502F PR SUBSEQUENT PRENATAL CARE: ICD-10-PCS | Mod: CPTII,S$GLB,, | Performed by: STUDENT IN AN ORGANIZED HEALTH CARE EDUCATION/TRAINING PROGRAM

## 2021-11-04 PROCEDURE — 86900 BLOOD TYPING SEROLOGIC ABO: CPT | Performed by: STUDENT IN AN ORGANIZED HEALTH CARE EDUCATION/TRAINING PROGRAM

## 2021-11-04 PROCEDURE — 86592 SYPHILIS TEST NON-TREP QUAL: CPT | Performed by: STUDENT IN AN ORGANIZED HEALTH CARE EDUCATION/TRAINING PROGRAM

## 2021-11-04 PROCEDURE — 0502F SUBSEQUENT PRENATAL CARE: CPT | Mod: CPTII,S$GLB,, | Performed by: STUDENT IN AN ORGANIZED HEALTH CARE EDUCATION/TRAINING PROGRAM

## 2021-11-04 PROCEDURE — 99999 PR PBB SHADOW E&M-EST. PATIENT-LVL II: CPT | Mod: PBBFAC,,, | Performed by: STUDENT IN AN ORGANIZED HEALTH CARE EDUCATION/TRAINING PROGRAM

## 2021-11-04 PROCEDURE — 83020 HEMOGLOBIN ELECTROPHORESIS: CPT | Performed by: STUDENT IN AN ORGANIZED HEALTH CARE EDUCATION/TRAINING PROGRAM

## 2021-11-04 PROCEDURE — 87389 HIV-1 AG W/HIV-1&-2 AB AG IA: CPT | Performed by: STUDENT IN AN ORGANIZED HEALTH CARE EDUCATION/TRAINING PROGRAM

## 2021-11-04 PROCEDURE — 81220 CFTR GENE COM VARIANTS: CPT | Performed by: STUDENT IN AN ORGANIZED HEALTH CARE EDUCATION/TRAINING PROGRAM

## 2021-11-04 PROCEDURE — 99999 PR PBB SHADOW E&M-EST. PATIENT-LVL II: ICD-10-PCS | Mod: PBBFAC,,, | Performed by: STUDENT IN AN ORGANIZED HEALTH CARE EDUCATION/TRAINING PROGRAM

## 2021-11-04 PROCEDURE — 87340 HEPATITIS B SURFACE AG IA: CPT | Performed by: STUDENT IN AN ORGANIZED HEALTH CARE EDUCATION/TRAINING PROGRAM

## 2021-11-04 PROCEDURE — 86762 RUBELLA ANTIBODY: CPT | Performed by: STUDENT IN AN ORGANIZED HEALTH CARE EDUCATION/TRAINING PROGRAM

## 2021-11-04 PROCEDURE — 85025 COMPLETE CBC W/AUTO DIFF WBC: CPT | Performed by: STUDENT IN AN ORGANIZED HEALTH CARE EDUCATION/TRAINING PROGRAM

## 2021-11-04 RX ORDER — ENOXAPARIN SODIUM 100 MG/ML
INJECTION SUBCUTANEOUS
COMMUNITY
Start: 2021-10-22 | End: 2021-11-18

## 2021-11-05 LAB
ABO + RH BLD: NORMAL
BLD GP AB SCN CELLS X3 SERPL QL: NORMAL
HBV SURFACE AG SERPL QL IA: NEGATIVE
HIV 1+2 AB+HIV1 P24 AG SERPL QL IA: NEGATIVE
RPR SER QL: NORMAL
RUBV IGG SER-ACNC: 36.8 IU/ML
RUBV IGG SER-IMP: REACTIVE

## 2021-11-08 LAB — CFTR MUT ANL BLD/T: NORMAL

## 2021-11-12 ENCOUNTER — PATIENT MESSAGE (OUTPATIENT)
Dept: OBSTETRICS AND GYNECOLOGY | Facility: CLINIC | Age: 26
End: 2021-11-12
Payer: COMMERCIAL

## 2021-11-18 LAB
HGB A2 MFR BLD HPLC: 2.8 % (ref 2.2–3.2)
HGB FRACT BLD ELPH-IMP: NORMAL
HGB FRACT BLD ELPH-IMP: NORMAL

## 2021-11-22 ENCOUNTER — PATIENT MESSAGE (OUTPATIENT)
Dept: MATERNAL FETAL MEDICINE | Facility: CLINIC | Age: 26
End: 2021-11-22
Payer: COMMERCIAL

## 2021-11-23 ENCOUNTER — PROCEDURE VISIT (OUTPATIENT)
Dept: MATERNAL FETAL MEDICINE | Facility: CLINIC | Age: 26
End: 2021-11-23
Attending: STUDENT IN AN ORGANIZED HEALTH CARE EDUCATION/TRAINING PROGRAM
Payer: COMMERCIAL

## 2021-11-23 ENCOUNTER — OFFICE VISIT (OUTPATIENT)
Dept: MATERNAL FETAL MEDICINE | Facility: CLINIC | Age: 26
End: 2021-11-23
Payer: COMMERCIAL

## 2021-11-23 DIAGNOSIS — Z36.9 ANTENATAL SCREENING ENCOUNTER: ICD-10-CM

## 2021-11-23 DIAGNOSIS — Z36.89 ENCOUNTER FOR ULTRASOUND TO ASSESS FETAL GROWTH: Primary | ICD-10-CM

## 2021-11-23 DIAGNOSIS — Z36.89 ENCOUNTER FOR FETAL ANATOMIC SURVEY: ICD-10-CM

## 2021-11-23 DIAGNOSIS — R93.89 ABNORMAL FINDING ON ULTRASOUND: ICD-10-CM

## 2021-11-23 PROCEDURE — 76805 OB US >/= 14 WKS SNGL FETUS: CPT | Mod: S$GLB,,, | Performed by: OBSTETRICS & GYNECOLOGY

## 2021-11-23 PROCEDURE — 76805 PR US, OB 14+WKS, TRANSABD, SINGLE GESTATION: ICD-10-PCS | Mod: S$GLB,,, | Performed by: OBSTETRICS & GYNECOLOGY

## 2021-11-23 PROCEDURE — 99212 PR OFFICE/OUTPT VISIT, EST, LEVL II, 10-19 MIN: ICD-10-PCS | Mod: 25,S$GLB,, | Performed by: OBSTETRICS & GYNECOLOGY

## 2021-11-23 PROCEDURE — 99212 OFFICE O/P EST SF 10 MIN: CPT | Mod: 25,S$GLB,, | Performed by: OBSTETRICS & GYNECOLOGY

## 2021-11-24 ENCOUNTER — PATIENT MESSAGE (OUTPATIENT)
Dept: MATERNAL FETAL MEDICINE | Facility: CLINIC | Age: 26
End: 2021-11-24
Payer: COMMERCIAL

## 2021-12-01 ENCOUNTER — OFFICE VISIT (OUTPATIENT)
Dept: PEDIATRIC CARDIOLOGY | Facility: CLINIC | Age: 26
End: 2021-12-01
Payer: COMMERCIAL

## 2021-12-01 ENCOUNTER — DOCUMENTATION ONLY (OUTPATIENT)
Dept: MATERNAL FETAL MEDICINE | Facility: CLINIC | Age: 26
End: 2021-12-01
Payer: COMMERCIAL

## 2021-12-01 ENCOUNTER — CLINICAL SUPPORT (OUTPATIENT)
Dept: PEDIATRIC CARDIOLOGY | Facility: CLINIC | Age: 26
End: 2021-12-01
Payer: COMMERCIAL

## 2021-12-01 VITALS
HEIGHT: 65 IN | DIASTOLIC BLOOD PRESSURE: 71 MMHG | SYSTOLIC BLOOD PRESSURE: 117 MMHG | WEIGHT: 142.88 LBS | HEART RATE: 93 BPM | BODY MASS INDEX: 23.81 KG/M2

## 2021-12-01 DIAGNOSIS — O35.BXX0 PREGNANCY COMPLICATED BY FETAL CONGENITAL HEART DISEASE, SINGLE OR UNSPECIFIED FETUS: Primary | ICD-10-CM

## 2021-12-01 DIAGNOSIS — R93.89 ABNORMAL FINDING ON ULTRASOUND: ICD-10-CM

## 2021-12-01 PROCEDURE — 99205 OFFICE O/P NEW HI 60 MIN: CPT | Mod: 25,S$GLB,, | Performed by: PEDIATRICS

## 2021-12-01 PROCEDURE — 76825 ECHO EXAM OF FETAL HEART: CPT | Mod: S$GLB,,, | Performed by: PEDIATRICS

## 2021-12-01 PROCEDURE — 99205 PR OFFICE/OUTPT VISIT, NEW, LEVL V, 60-74 MIN: ICD-10-PCS | Mod: 25,S$GLB,, | Performed by: PEDIATRICS

## 2021-12-01 PROCEDURE — 99999 PR PBB SHADOW E&M-EST. PATIENT-LVL III: CPT | Mod: PBBFAC,,, | Performed by: PEDIATRICS

## 2021-12-01 PROCEDURE — 99999 PR PBB SHADOW E&M-EST. PATIENT-LVL III: ICD-10-PCS | Mod: PBBFAC,,, | Performed by: PEDIATRICS

## 2021-12-01 PROCEDURE — 76825 PR  SO2 FETAL HEART: ICD-10-PCS | Mod: S$GLB,,, | Performed by: PEDIATRICS

## 2021-12-01 PROCEDURE — 93325 PR DOPPLER COLOR FLOW VELOCITY MAP: ICD-10-PCS | Mod: S$GLB,,, | Performed by: PEDIATRICS

## 2021-12-01 PROCEDURE — 93325 DOPPLER ECHO COLOR FLOW MAPG: CPT | Mod: S$GLB,,, | Performed by: PEDIATRICS

## 2021-12-01 PROCEDURE — 76827 ECHO EXAM OF FETAL HEART: CPT | Mod: S$GLB,,, | Performed by: PEDIATRICS

## 2021-12-01 PROCEDURE — 76827 PR  SO2 FETAL HEART DOPPLER: ICD-10-PCS | Mod: S$GLB,,, | Performed by: PEDIATRICS

## 2021-12-07 ENCOUNTER — PATIENT MESSAGE (OUTPATIENT)
Dept: MATERNAL FETAL MEDICINE | Facility: CLINIC | Age: 26
End: 2021-12-07
Payer: COMMERCIAL

## 2021-12-08 ENCOUNTER — OFFICE VISIT (OUTPATIENT)
Dept: MATERNAL FETAL MEDICINE | Facility: CLINIC | Age: 26
End: 2021-12-08
Payer: COMMERCIAL

## 2021-12-08 ENCOUNTER — PROCEDURE VISIT (OUTPATIENT)
Dept: MATERNAL FETAL MEDICINE | Facility: CLINIC | Age: 26
End: 2021-12-08
Attending: STUDENT IN AN ORGANIZED HEALTH CARE EDUCATION/TRAINING PROGRAM
Payer: COMMERCIAL

## 2021-12-08 ENCOUNTER — ROUTINE PRENATAL (OUTPATIENT)
Dept: OBSTETRICS AND GYNECOLOGY | Facility: CLINIC | Age: 26
End: 2021-12-08
Attending: STUDENT IN AN ORGANIZED HEALTH CARE EDUCATION/TRAINING PROGRAM
Payer: COMMERCIAL

## 2021-12-08 VITALS
WEIGHT: 143.94 LBS | BODY MASS INDEX: 23.98 KG/M2 | HEIGHT: 65 IN | DIASTOLIC BLOOD PRESSURE: 68 MMHG | SYSTOLIC BLOOD PRESSURE: 119 MMHG

## 2021-12-08 VITALS
SYSTOLIC BLOOD PRESSURE: 102 MMHG | DIASTOLIC BLOOD PRESSURE: 66 MMHG | BODY MASS INDEX: 24.01 KG/M2 | WEIGHT: 144.31 LBS

## 2021-12-08 DIAGNOSIS — Z36.89 ENCOUNTER FOR ULTRASOUND TO ASSESS FETAL GROWTH: Primary | ICD-10-CM

## 2021-12-08 DIAGNOSIS — Z86.718 HISTORY OF DVT (DEEP VEIN THROMBOSIS): Primary | ICD-10-CM

## 2021-12-08 DIAGNOSIS — Z86.718 HISTORY OF DVT OF LOWER EXTREMITY: ICD-10-CM

## 2021-12-08 DIAGNOSIS — Z36.89 ENCOUNTER FOR ULTRASOUND TO ASSESS FETAL GROWTH: ICD-10-CM

## 2021-12-08 DIAGNOSIS — R93.89 ABNORMAL FINDING ON ULTRASOUND: ICD-10-CM

## 2021-12-08 DIAGNOSIS — Z3A.21 21 WEEKS GESTATION OF PREGNANCY: Primary | ICD-10-CM

## 2021-12-08 PROCEDURE — 0502F PR SUBSEQUENT PRENATAL CARE: ICD-10-PCS | Mod: S$GLB,,, | Performed by: STUDENT IN AN ORGANIZED HEALTH CARE EDUCATION/TRAINING PROGRAM

## 2021-12-08 PROCEDURE — 99999 PR PBB SHADOW E&M-EST. PATIENT-LVL II: ICD-10-PCS | Mod: PBBFAC,,, | Performed by: STUDENT IN AN ORGANIZED HEALTH CARE EDUCATION/TRAINING PROGRAM

## 2021-12-08 PROCEDURE — 0502F SUBSEQUENT PRENATAL CARE: CPT | Mod: S$GLB,,, | Performed by: STUDENT IN AN ORGANIZED HEALTH CARE EDUCATION/TRAINING PROGRAM

## 2021-12-08 PROCEDURE — 99999 PR PBB SHADOW E&M-EST. PATIENT-LVL II: ICD-10-PCS | Mod: PBBFAC,,, | Performed by: OBSTETRICS & GYNECOLOGY

## 2021-12-08 PROCEDURE — 99999 PR PBB SHADOW E&M-EST. PATIENT-LVL II: CPT | Mod: PBBFAC,,, | Performed by: STUDENT IN AN ORGANIZED HEALTH CARE EDUCATION/TRAINING PROGRAM

## 2021-12-08 PROCEDURE — 76816 US MFM PROCEDURE (VIEWPOINT): ICD-10-PCS | Mod: Q6,S$GLB,, | Performed by: OBSTETRICS & GYNECOLOGY

## 2021-12-08 PROCEDURE — 99999 PR PBB SHADOW E&M-EST. PATIENT-LVL II: CPT | Mod: PBBFAC,,, | Performed by: OBSTETRICS & GYNECOLOGY

## 2021-12-08 PROCEDURE — 76816 OB US FOLLOW-UP PER FETUS: CPT | Mod: Q6,S$GLB,, | Performed by: OBSTETRICS & GYNECOLOGY

## 2021-12-09 ENCOUNTER — PATIENT MESSAGE (OUTPATIENT)
Dept: OBSTETRICS AND GYNECOLOGY | Facility: CLINIC | Age: 26
End: 2021-12-09
Payer: COMMERCIAL

## 2021-12-10 ENCOUNTER — TELEPHONE (OUTPATIENT)
Dept: OBSTETRICS AND GYNECOLOGY | Facility: CLINIC | Age: 26
End: 2021-12-10
Payer: COMMERCIAL

## 2021-12-13 ENCOUNTER — TELEPHONE (OUTPATIENT)
Dept: MATERNAL FETAL MEDICINE | Facility: CLINIC | Age: 26
End: 2021-12-13
Payer: COMMERCIAL

## 2021-12-13 ENCOUNTER — DOCUMENTATION ONLY (OUTPATIENT)
Dept: OBSTETRICS AND GYNECOLOGY | Facility: HOSPITAL | Age: 26
End: 2021-12-13
Payer: COMMERCIAL

## 2021-12-14 ENCOUNTER — DOCUMENTATION ONLY (OUTPATIENT)
Dept: MATERNAL FETAL MEDICINE | Facility: CLINIC | Age: 26
End: 2021-12-14
Payer: COMMERCIAL

## 2021-12-14 ENCOUNTER — TELEPHONE (OUTPATIENT)
Dept: OBSTETRICS AND GYNECOLOGY | Facility: CLINIC | Age: 26
End: 2021-12-14
Payer: COMMERCIAL

## 2021-12-14 DIAGNOSIS — Z86.718 HISTORY OF DEEP VEIN THROMBOSIS: Primary | ICD-10-CM

## 2021-12-15 ENCOUNTER — CONFERENCE (OUTPATIENT)
Dept: PEDIATRIC CARDIOLOGY | Facility: CLINIC | Age: 26
End: 2021-12-15
Payer: COMMERCIAL

## 2021-12-22 ENCOUNTER — TELEPHONE (OUTPATIENT)
Dept: OBSTETRICS AND GYNECOLOGY | Facility: CLINIC | Age: 26
End: 2021-12-22
Payer: COMMERCIAL

## 2021-12-22 ENCOUNTER — HOSPITAL ENCOUNTER (OUTPATIENT)
Dept: CARDIOLOGY | Facility: OTHER | Age: 26
Discharge: HOME | End: 2021-12-22
Attending: STUDENT IN AN ORGANIZED HEALTH CARE EDUCATION/TRAINING PROGRAM
Payer: COMMERCIAL

## 2021-12-22 VITALS
BODY MASS INDEX: 23.82 KG/M2 | SYSTOLIC BLOOD PRESSURE: 119 MMHG | HEIGHT: 65 IN | HEART RATE: 85 BPM | DIASTOLIC BLOOD PRESSURE: 68 MMHG | WEIGHT: 143 LBS

## 2021-12-22 DIAGNOSIS — Z86.718 HISTORY OF DEEP VEIN THROMBOSIS: ICD-10-CM

## 2021-12-22 LAB
ASCENDING AORTA: 2.41 CM
AV INDEX (PROSTH): 0.81
AV MEAN GRADIENT: 4 MMHG
AV PEAK GRADIENT: 8 MMHG
AV VALVE AREA: 2.23 CM2
AV VELOCITY RATIO: 0.77
BSA FOR ECHO PROCEDURE: 1.72 M2
CV ECHO LV RWT: 0.3 CM
DOP CALC AO PEAK VEL: 1.38 M/S
DOP CALC AO VTI: 24.52 CM
DOP CALC LVOT AREA: 2.7 CM2
DOP CALC LVOT DIAMETER: 1.87 CM
DOP CALC LVOT PEAK VEL: 1.06 M/S
DOP CALC LVOT STROKE VOLUME: 54.63 CM3
DOP CALCLVOT PEAK VEL VTI: 19.9 CM
E WAVE DECELERATION TIME: 151.95 MSEC
E/A RATIO: 1.2
E/E' RATIO: 5.21 M/S
ECHO LV POSTERIOR WALL: 0.7 CM (ref 0.6–1.1)
EJECTION FRACTION: 68 %
FRACTIONAL SHORTENING: 38 % (ref 28–44)
INTERVENTRICULAR SEPTUM: 0.66 CM (ref 0.6–1.1)
IVRT: 89.97 MSEC
LA MAJOR: 4.24 CM
LA MINOR: 3.59 CM
LA WIDTH: 3.89 CM
LEFT ATRIUM SIZE: 3.25 CM
LEFT ATRIUM VOLUME INDEX MOD: 18.6 ML/M2
LEFT ATRIUM VOLUME INDEX: 24.3 ML/M2
LEFT ATRIUM VOLUME MOD: 32 CM3
LEFT ATRIUM VOLUME: 41.78 CM3
LEFT INTERNAL DIMENSION IN SYSTOLE: 2.96 CM (ref 2.1–4)
LEFT VENTRICLE DIASTOLIC VOLUME INDEX: 60.84 ML/M2
LEFT VENTRICLE DIASTOLIC VOLUME: 104.65 ML
LEFT VENTRICLE MASS INDEX: 59 G/M2
LEFT VENTRICLE SYSTOLIC VOLUME INDEX: 19.7 ML/M2
LEFT VENTRICLE SYSTOLIC VOLUME: 33.81 ML
LEFT VENTRICULAR INTERNAL DIMENSION IN DIASTOLE: 4.74 CM (ref 3.5–6)
LEFT VENTRICULAR MASS: 100.84 G
LV LATERAL E/E' RATIO: 5.21 M/S
LV SEPTAL E/E' RATIO: 5.21 M/S
MV A" WAVE DURATION": 4.84 MSEC
MV PEAK A VEL: 0.61 M/S
MV PEAK E VEL: 0.73 M/S
MV STENOSIS PRESSURE HALF TIME: 44.07 MS
MV VALVE AREA P 1/2 METHOD: 4.99 CM2
PISA TR MAX VEL: 1.8 M/S
PULM VEIN S/D RATIO: 1.3
PV PEAK D VEL: 0.47 M/S
PV PEAK S VEL: 0.61 M/S
PV PEAK VELOCITY: 0.88 CM/S
RA MAJOR: 4.28 CM
RA PRESSURE: 3 MMHG
RIGHT VENTRICULAR END-DIASTOLIC DIMENSION: 2.7 CM
RV TISSUE DOPPLER FREE WALL SYSTOLIC VELOCITY 1 (APICAL 4 CHAMBER VIEW): 14.25 CM/S
SINUS: 2.56 CM
STJ: 2.47 CM
TDI LATERAL: 0.14 M/S
TDI SEPTAL: 0.14 M/S
TDI: 0.14 M/S
TR MAX PG: 13 MMHG
TRICUSPID ANNULAR PLANE SYSTOLIC EXCURSION: 2.21 CM
TV REST PULMONARY ARTERY PRESSURE: 16 MMHG

## 2021-12-22 PROCEDURE — 93306 TTE W/DOPPLER COMPLETE: CPT | Mod: 26,,, | Performed by: INTERNAL MEDICINE

## 2021-12-22 PROCEDURE — 93306 TTE W/DOPPLER COMPLETE: CPT

## 2021-12-22 PROCEDURE — 93306 ECHO (CUPID ONLY): ICD-10-PCS | Mod: 26,,, | Performed by: INTERNAL MEDICINE

## 2022-01-04 ENCOUNTER — ROUTINE PRENATAL (OUTPATIENT)
Dept: OBSTETRICS AND GYNECOLOGY | Facility: CLINIC | Age: 27
End: 2022-01-04
Attending: STUDENT IN AN ORGANIZED HEALTH CARE EDUCATION/TRAINING PROGRAM
Payer: COMMERCIAL

## 2022-01-04 ENCOUNTER — PATIENT MESSAGE (OUTPATIENT)
Dept: MATERNAL FETAL MEDICINE | Facility: CLINIC | Age: 27
End: 2022-01-04
Payer: COMMERCIAL

## 2022-01-04 VITALS
WEIGHT: 149.69 LBS | SYSTOLIC BLOOD PRESSURE: 110 MMHG | DIASTOLIC BLOOD PRESSURE: 76 MMHG | BODY MASS INDEX: 24.91 KG/M2

## 2022-01-04 DIAGNOSIS — O35.BXX1 ANOMALY OF HEART OF FETUS AFFECTING PREGNANCY, ANTEPARTUM, FETUS 1: Primary | ICD-10-CM

## 2022-01-04 DIAGNOSIS — Z3A.25 25 WEEKS GESTATION OF PREGNANCY: Primary | ICD-10-CM

## 2022-01-04 PROCEDURE — 0502F PR SUBSEQUENT PRENATAL CARE: ICD-10-PCS | Mod: S$GLB,,, | Performed by: STUDENT IN AN ORGANIZED HEALTH CARE EDUCATION/TRAINING PROGRAM

## 2022-01-04 PROCEDURE — 99999 PR PBB SHADOW E&M-EST. PATIENT-LVL II: CPT | Mod: PBBFAC,,, | Performed by: STUDENT IN AN ORGANIZED HEALTH CARE EDUCATION/TRAINING PROGRAM

## 2022-01-04 PROCEDURE — 99999 PR PBB SHADOW E&M-EST. PATIENT-LVL II: ICD-10-PCS | Mod: PBBFAC,,, | Performed by: STUDENT IN AN ORGANIZED HEALTH CARE EDUCATION/TRAINING PROGRAM

## 2022-01-04 PROCEDURE — 0502F SUBSEQUENT PRENATAL CARE: CPT | Mod: S$GLB,,, | Performed by: STUDENT IN AN ORGANIZED HEALTH CARE EDUCATION/TRAINING PROGRAM

## 2022-01-05 ENCOUNTER — OFFICE VISIT (OUTPATIENT)
Dept: MATERNAL FETAL MEDICINE | Facility: CLINIC | Age: 27
End: 2022-01-05
Payer: COMMERCIAL

## 2022-01-05 ENCOUNTER — CLINICAL SUPPORT (OUTPATIENT)
Dept: PEDIATRIC CARDIOLOGY | Facility: CLINIC | Age: 27
End: 2022-01-05
Payer: COMMERCIAL

## 2022-01-05 ENCOUNTER — PROCEDURE VISIT (OUTPATIENT)
Dept: MATERNAL FETAL MEDICINE | Facility: CLINIC | Age: 27
End: 2022-01-05
Payer: COMMERCIAL

## 2022-01-05 ENCOUNTER — OFFICE VISIT (OUTPATIENT)
Dept: PEDIATRIC CARDIOLOGY | Facility: CLINIC | Age: 27
End: 2022-01-05
Payer: COMMERCIAL

## 2022-01-05 VITALS
SYSTOLIC BLOOD PRESSURE: 129 MMHG | WEIGHT: 149.5 LBS | BODY MASS INDEX: 24.91 KG/M2 | DIASTOLIC BLOOD PRESSURE: 71 MMHG | HEIGHT: 65 IN

## 2022-01-05 VITALS
BODY MASS INDEX: 24.91 KG/M2 | SYSTOLIC BLOOD PRESSURE: 129 MMHG | HEIGHT: 65 IN | WEIGHT: 149.5 LBS | DIASTOLIC BLOOD PRESSURE: 71 MMHG

## 2022-01-05 DIAGNOSIS — O35.BXX0 ANOMALY OF HEART OF FETUS AFFECTING PREGNANCY, ANTEPARTUM, SINGLE OR UNSPECIFIED FETUS: ICD-10-CM

## 2022-01-05 DIAGNOSIS — O35.BXX0 ANOMALY OF HEART OF FETUS AFFECTING PREGNANCY, ANTEPARTUM, SINGLE OR UNSPECIFIED FETUS: Primary | ICD-10-CM

## 2022-01-05 DIAGNOSIS — Z36.89 ENCOUNTER FOR ULTRASOUND TO ASSESS FETAL GROWTH: ICD-10-CM

## 2022-01-05 DIAGNOSIS — O35.BXX1 ANOMALY OF HEART OF FETUS AFFECTING PREGNANCY, ANTEPARTUM, FETUS 1: ICD-10-CM

## 2022-01-05 PROCEDURE — 93325 DOPPLER ECHO COLOR FLOW MAPG: CPT | Mod: S$GLB,,, | Performed by: PEDIATRICS

## 2022-01-05 PROCEDURE — 99999 PR PBB SHADOW E&M-EST. PATIENT-LVL III: CPT | Mod: PBBFAC,,, | Performed by: OBSTETRICS & GYNECOLOGY

## 2022-01-05 PROCEDURE — 76816 PR  US,PREGNANT UTERUS,F/U,TRANSABD APP: ICD-10-PCS | Mod: S$GLB,,, | Performed by: OBSTETRICS & GYNECOLOGY

## 2022-01-05 PROCEDURE — 99213 PR OFFICE/OUTPT VISIT, EST, LEVL III, 20-29 MIN: ICD-10-PCS | Mod: 25,S$GLB,, | Performed by: OBSTETRICS & GYNECOLOGY

## 2022-01-05 PROCEDURE — 76826 ECHO EXAM OF FETAL HEART: CPT | Mod: S$GLB,,, | Performed by: PEDIATRICS

## 2022-01-05 PROCEDURE — 99213 OFFICE O/P EST LOW 20 MIN: CPT | Mod: 25,S$GLB,, | Performed by: OBSTETRICS & GYNECOLOGY

## 2022-01-05 PROCEDURE — 93325 PR DOPPLER COLOR FLOW VELOCITY MAP: ICD-10-PCS | Mod: S$GLB,,, | Performed by: PEDIATRICS

## 2022-01-05 PROCEDURE — 76826 PR ECHO 2D W/WO M-MODE, FETAL, F/UP: ICD-10-PCS | Mod: S$GLB,,, | Performed by: PEDIATRICS

## 2022-01-05 PROCEDURE — 99999 PR PBB SHADOW E&M-EST. PATIENT-LVL III: ICD-10-PCS | Mod: PBBFAC,,, | Performed by: OBSTETRICS & GYNECOLOGY

## 2022-01-05 PROCEDURE — 76828 ECHO EXAM OF FETAL HEART: CPT | Mod: S$GLB,,, | Performed by: PEDIATRICS

## 2022-01-05 PROCEDURE — 99215 OFFICE O/P EST HI 40 MIN: CPT | Mod: 25,S$GLB,, | Performed by: PEDIATRICS

## 2022-01-05 PROCEDURE — 76828 PR  SO2 FETAL HRT DOPPL F/U: ICD-10-PCS | Mod: S$GLB,,, | Performed by: PEDIATRICS

## 2022-01-05 PROCEDURE — 99215 PR OFFICE/OUTPT VISIT, EST, LEVL V, 40-54 MIN: ICD-10-PCS | Mod: 25,S$GLB,, | Performed by: PEDIATRICS

## 2022-01-05 PROCEDURE — 99999 PR PBB SHADOW E&M-EST. PATIENT-LVL III: CPT | Mod: PBBFAC,,, | Performed by: PEDIATRICS

## 2022-01-05 PROCEDURE — 99999 PR PBB SHADOW E&M-EST. PATIENT-LVL III: ICD-10-PCS | Mod: PBBFAC,,, | Performed by: PEDIATRICS

## 2022-01-05 PROCEDURE — 76816 OB US FOLLOW-UP PER FETUS: CPT | Mod: S$GLB,,, | Performed by: OBSTETRICS & GYNECOLOGY

## 2022-01-05 NOTE — PROGRESS NOTES
Obstetrical ultrasound and consultation completed today.  See full ultrasound report in imaging section of [employer].    Consultation  ==========    25 minutes of total time was spent on the encounter which included face-to-face time and non-face-to-face time preparing to see the patient,  obtaining and or reviewing separately obtained history, documenting clinical information in the electronic or other health record, independently  interpreting results (not separately reported) and communicating results to the patient and her spouse, or care coordination (not separately  reported).    Referring MD: Dr. Clark    Fetal DX: Shone complex    Prior investigations:  1. MFM ultrasounds - no non-cardiac malformations identified.  2. Fetal echo (Dr. Kang) - Shone complex  3. cfDNA testing - low risk for common fetal aneuploidies (amniocentesis declined)    Issues discussed today:  1. AGA fetal growth; no non-cardiac fetal malformations identified  2. DX of Shone complex --- being followed by Dr. Kang of Pediatric Cardiology  3. Plan for serial ultrasound studies to assess fetal growth  4. Delivery timing and mode of delivery -- ideally delivery will occur at ~ 39 weeks. The fetal heart abnormality is not an indication for delivery via  . Mode of delivery should be determined by usual obstetric considerations.    Ultrasound Impression  =========    1. IUP - 25 and 1/7 weeks  2. Known fetal CHD (Shone complex)  3. AGA fetal size    Recommendation  ==============    F/U growth scan in 4 weeks --- will coordinate appt. date with next Pediatric Cardiology appt.

## 2022-01-06 ENCOUNTER — PATIENT MESSAGE (OUTPATIENT)
Dept: OBSTETRICS AND GYNECOLOGY | Facility: CLINIC | Age: 27
End: 2022-01-06
Payer: COMMERCIAL

## 2022-01-06 NOTE — TELEPHONE ENCOUNTER
Spoke with patient and all questions answered.  She has reached out to [facility] and will let us know if she needs actual referral form.  All questions answered.

## 2022-01-06 NOTE — PROGRESS NOTES
[facility] [address] [facility] Cardiology Clinic    Today, I had the pleasure of evaluating Niesha Pierre who is now 26 y.o. and carrying her second pregnancy at 25 1/7 weeks gestation with an BRIDGER of 22. Her fetus has Shone complex.  She is carrying a male fetus, named James.      Obstetric History:    .  Her OB care is by Dr. Alarcon.  Her MFM care is by Dr. Mccrary.     Past Medical History:   Diagnosis Date    Depression     History of DVT of lower extremity 3/7/2019         Current Outpatient Medications:     enoxaparin (LOVENOX) 40 mg/0.4 mL Syrg, INJECT 0.4MLS INTO THE SKIN EVERY DAY, Disp: 12 mL, Rfl: 1    prenatal vit calc,iron,folic (PRENATAL VITAMIN ORAL), Take 1 tablet by mouth., Disp: , Rfl:     ondansetron (ZOFRAN-ODT) 4 MG TbDL, Take 1 tablet (4 mg total) by mouth every 8 (eight) hours as needed (nausea). (Patient not taking: Reported on 2022), Disp: 30 tablet, Rfl: 2    progesterone (PROMETRIUM) 100 MG capsule, Take 2 capsules (200 mg total) by mouth once daily. (Patient not taking: No sig reported), Disp: 60 capsule, Rfl: 4    Family History: Negative for congenital heart disease, early coronary artery disease, sudden unexplained death, connective tissues disorders, genetic syndromes, multiple miscarriages or other congenital anomalies.    Social History: Mrs. Pierre is  to the father of the baby.     FETAL ECHOCARDIOGRAM (summary):  Fetal echocardiogram at 25 1/7 weeks gestation for a history of Shone complex. BRIDGER 22.  Shone complex with moderate mitral, aortic valve and aortic isthmus hypoplasia.  There is bidirectional movement of the atrial septum, with mainly right to left movement.  LSVC to a dilated coronary sinus.  Moderately dilated right atrium.  Mildly hypoplastic left atrium.  Mildly dilated RV.  Low normal size LV.  There appears to be subaortic tissue that is not obstructive at this time.  No right or left ventricle outflow tract  obstruction.  Normal sinus rhythm.  No ectopy or sustained arrhythmia demonstrated throughout the study.  Normal fetal atrial and ductal level shunting.  No ventricular level shunting.  Normal tricuspid and pulmonary valve structure and function.  Normal ductal arch.  Normal biventricular systolic function.  No pericardial effusion.  (Full report in electronic medical record)    Impression:  Single active male fetus at 25 wga.  The fetus has Shone complex with now moderate mitral,aortic valve hypoplasia, and aortic isthmus hypoplasia. We will need to follow the baby during gestation to ensure that the left heart structures continue to grow.  The left heart structures are now marginal.  If they continue at this size, hopefully the only intervention needed will be for a coarctation, which I think is certain.  If the left heart ceases to grow, we will need to discuss further palliation, which I plan to describe on our next meeting.    I discussed with her that fetal echocardiography is insufficiently sensitive to rule out all septal defects, anomalies of pulmonary and systemic veins, arch anomalies, and some valvar abnormalities, nor can it ensure that the ductus arteriosus and foramen ovale will spontaneously close.     Recommendations:  1. Prenatal cardiac diagnosis: Shone complex with moderate mitral, aortic valve hypoplasia, and mild aortic isthmus hypoplasia  2. Any prenatal genetic diagnoses or other major associated abnormalities, conditions:none  3. Primary Fetal Cardiologist: Pearl  Prenatal Recommendations:   1. Prenatal Cath MD Consult: No   2. Prenatal Congenital Heart Surgery Consult ? No   3. Follow up fetal cardiac evaluation in 4 weeks  Delivery and post  recommendations as of last fetal visit:  1. Recommend delivery at tertiary care center such as [facility] where Pediatric Cardiology and Congenital Heart Surgery care are immediately available  2. From fetal cardiac perspective, ok  for vaginal delivery, preferably after 38-39 weeks gestation  3. Recommend scheduled weekday delivery during daytime hours so that all needed caretakers are immediately available  4. PGE: have available at bedside   5. Likely need for urgent intervention within first hours after delivery: No   6. Cardiology Consultant recommended to attend delivery: No   7. Transition to CVICU  8. Cardiology consult: Yes  9.   Genetics recommendation: CMA  10.  surgical or cath intervention: coarctation repair    The above information was discussed in detail including the use of diagrams, with 60 minutes of total face to face time, with greater than 50% with counseling and coordination of care.  The discussion of the diagnosis and treatment options is as described above.      Christian Kang MD, MPH  Pediatric and Fetal Cardiology  [facility]   [address] [place] [address]    Office: [phone]  Cell: [phone]

## 2022-01-07 ENCOUNTER — TELEPHONE (OUTPATIENT)
Dept: OBSTETRICS AND GYNECOLOGY | Facility: CLINIC | Age: 27
End: 2022-01-07
Payer: COMMERCIAL

## 2022-01-07 NOTE — TELEPHONE ENCOUNTER
Called patient to clarify message that was sent to me. No answer. Left a message instructing the patient to return call to further discuss.

## 2022-01-11 ENCOUNTER — CONFERENCE (OUTPATIENT)
Dept: PEDIATRIC CARDIOLOGY | Facility: CLINIC | Age: 27
End: 2022-01-11
Payer: COMMERCIAL

## 2022-01-13 ENCOUNTER — TELEPHONE (OUTPATIENT)
Dept: OBSTETRICS AND GYNECOLOGY | Facility: CLINIC | Age: 27
End: 2022-01-13
Payer: COMMERCIAL

## 2022-01-13 DIAGNOSIS — Z3A.26 26 WEEKS GESTATION OF PREGNANCY: Primary | ICD-10-CM

## 2022-01-20 ENCOUNTER — LAB VISIT (OUTPATIENT)
Dept: LAB | Facility: HOSPITAL | Age: 27
End: 2022-01-20
Attending: STUDENT IN AN ORGANIZED HEALTH CARE EDUCATION/TRAINING PROGRAM
Payer: COMMERCIAL

## 2022-01-20 DIAGNOSIS — Z3A.26 26 WEEKS GESTATION OF PREGNANCY: ICD-10-CM

## 2022-01-20 LAB
BASOPHILS # BLD AUTO: 0.05 K/UL (ref 0–0.2)
BASOPHILS NFR BLD: 0.6 % (ref 0–1.9)
DIFFERENTIAL METHOD: ABNORMAL
EOSINOPHIL # BLD AUTO: 0.2 K/UL (ref 0–0.5)
EOSINOPHIL NFR BLD: 1.7 % (ref 0–8)
ERYTHROCYTE [DISTWIDTH] IN BLOOD BY AUTOMATED COUNT: 13.2 % (ref 11.5–14.5)
GLUCOSE SERPL-MCNC: 120 MG/DL (ref 70–140)
HCT VFR BLD AUTO: 33 % (ref 37–48.5)
HGB BLD-MCNC: 10.8 G/DL (ref 12–16)
IMM GRANULOCYTES # BLD AUTO: 0.05 K/UL (ref 0–0.04)
IMM GRANULOCYTES NFR BLD AUTO: 0.6 % (ref 0–0.5)
LYMPHOCYTES # BLD AUTO: 1.8 K/UL (ref 1–4.8)
LYMPHOCYTES NFR BLD: 21.3 % (ref 18–48)
MCH RBC QN AUTO: 28.9 PG (ref 27–31)
MCHC RBC AUTO-ENTMCNC: 32.7 G/DL (ref 32–36)
MCV RBC AUTO: 88 FL (ref 82–98)
MONOCYTES # BLD AUTO: 0.5 K/UL (ref 0.3–1)
MONOCYTES NFR BLD: 5.7 % (ref 4–15)
NEUTROPHILS # BLD AUTO: 6 K/UL (ref 1.8–7.7)
NEUTROPHILS NFR BLD: 70.1 % (ref 38–73)
NRBC BLD-RTO: 0 /100 WBC
PLATELET # BLD AUTO: 211 K/UL (ref 150–450)
PMV BLD AUTO: 10.8 FL (ref 9.2–12.9)
RBC # BLD AUTO: 3.74 M/UL (ref 4–5.4)
WBC # BLD AUTO: 8.62 K/UL (ref 3.9–12.7)

## 2022-01-20 PROCEDURE — 85025 COMPLETE CBC W/AUTO DIFF WBC: CPT | Performed by: STUDENT IN AN ORGANIZED HEALTH CARE EDUCATION/TRAINING PROGRAM

## 2022-01-20 PROCEDURE — 82950 GLUCOSE TEST: CPT | Performed by: STUDENT IN AN ORGANIZED HEALTH CARE EDUCATION/TRAINING PROGRAM

## 2022-01-27 DIAGNOSIS — O35.BXX0 ANOMALY OF HEART OF FETUS AFFECTING PREGNANCY, ANTEPARTUM, SINGLE OR UNSPECIFIED FETUS: Primary | ICD-10-CM

## 2022-02-01 ENCOUNTER — PATIENT MESSAGE (OUTPATIENT)
Dept: MATERNAL FETAL MEDICINE | Facility: CLINIC | Age: 27
End: 2022-02-01
Payer: COMMERCIAL

## 2022-02-01 ENCOUNTER — PATIENT MESSAGE (OUTPATIENT)
Dept: OBSTETRICS AND GYNECOLOGY | Facility: CLINIC | Age: 27
End: 2022-02-01
Payer: COMMERCIAL

## 2022-02-03 ENCOUNTER — ROUTINE PRENATAL (OUTPATIENT)
Dept: OBSTETRICS AND GYNECOLOGY | Facility: CLINIC | Age: 27
End: 2022-02-03
Attending: STUDENT IN AN ORGANIZED HEALTH CARE EDUCATION/TRAINING PROGRAM
Payer: COMMERCIAL

## 2022-02-03 VITALS — BODY MASS INDEX: 24.97 KG/M2 | DIASTOLIC BLOOD PRESSURE: 68 MMHG | SYSTOLIC BLOOD PRESSURE: 120 MMHG | WEIGHT: 150 LBS

## 2022-02-03 DIAGNOSIS — F41.9 ANXIETY: Primary | ICD-10-CM

## 2022-02-03 PROCEDURE — 99999 PR PBB SHADOW E&M-EST. PATIENT-LVL III: ICD-10-PCS | Mod: PBBFAC,,, | Performed by: STUDENT IN AN ORGANIZED HEALTH CARE EDUCATION/TRAINING PROGRAM

## 2022-02-03 PROCEDURE — 0502F PR SUBSEQUENT PRENATAL CARE: ICD-10-PCS | Mod: S$GLB,,, | Performed by: STUDENT IN AN ORGANIZED HEALTH CARE EDUCATION/TRAINING PROGRAM

## 2022-02-03 PROCEDURE — 0502F SUBSEQUENT PRENATAL CARE: CPT | Mod: S$GLB,,, | Performed by: STUDENT IN AN ORGANIZED HEALTH CARE EDUCATION/TRAINING PROGRAM

## 2022-02-03 PROCEDURE — 99999 PR PBB SHADOW E&M-EST. PATIENT-LVL III: CPT | Mod: PBBFAC,,, | Performed by: STUDENT IN AN ORGANIZED HEALTH CARE EDUCATION/TRAINING PROGRAM

## 2022-02-03 NOTE — PROGRESS NOTES
Doing well.  No complaints.  Reports fetal movement.  Denies contractions, leakage of fluid, vaginal bleeding.  Received 2nd opinion at [facility] and is feeling comfortable with plan.  Would like to follow up with Dr. Kang as scheduled.  Also with some increasing anxiety.  Previously did well with zoloft following last delivery.  Does not want to restart medication right now.  Will consider options.  Discussed counseling as well.  Will place referral.  All questions answered.  RTC in 2 weeks or sooner if needed.

## 2022-02-10 ENCOUNTER — CLINICAL SUPPORT (OUTPATIENT)
Dept: PEDIATRIC CARDIOLOGY | Facility: CLINIC | Age: 27
End: 2022-02-10
Attending: PEDIATRICS
Payer: COMMERCIAL

## 2022-02-10 ENCOUNTER — PATIENT MESSAGE (OUTPATIENT)
Dept: PEDIATRIC CARDIOLOGY | Facility: CLINIC | Age: 27
End: 2022-02-10

## 2022-02-10 VITALS — SYSTOLIC BLOOD PRESSURE: 113 MMHG | HEART RATE: 92 BPM | DIASTOLIC BLOOD PRESSURE: 64 MMHG | OXYGEN SATURATION: 97 %

## 2022-02-10 DIAGNOSIS — O35.BXX0 ANOMALY OF HEART OF FETUS AFFECTING PREGNANCY, ANTEPARTUM, SINGLE OR UNSPECIFIED FETUS: ICD-10-CM

## 2022-02-10 DIAGNOSIS — O35.BXX0 ANOMALY OF HEART OF FETUS AFFECTING PREGNANCY, ANTEPARTUM, SINGLE OR UNSPECIFIED FETUS: Primary | ICD-10-CM

## 2022-02-10 PROCEDURE — 99999 PR PBB SHADOW E&M-EST. PATIENT-LVL III: ICD-10-PCS | Mod: PBBFAC,,, | Performed by: PEDIATRICS

## 2022-02-10 PROCEDURE — 99215 PR OFFICE/OUTPT VISIT, EST, LEVL V, 40-54 MIN: ICD-10-PCS | Mod: 25,S$GLB,, | Performed by: PEDIATRICS

## 2022-02-10 PROCEDURE — 99215 OFFICE O/P EST HI 40 MIN: CPT | Mod: 25,S$GLB,, | Performed by: PEDIATRICS

## 2022-02-10 PROCEDURE — 99999 PR PBB SHADOW E&M-EST. PATIENT-LVL III: CPT | Mod: PBBFAC,,, | Performed by: PEDIATRICS

## 2022-02-11 ENCOUNTER — CONFERENCE (OUTPATIENT)
Dept: PEDIATRIC CARDIOLOGY | Facility: CLINIC | Age: 27
End: 2022-02-11
Payer: COMMERCIAL

## 2022-02-14 ENCOUNTER — PATIENT MESSAGE (OUTPATIENT)
Dept: MATERNAL FETAL MEDICINE | Facility: CLINIC | Age: 27
End: 2022-02-14
Payer: COMMERCIAL

## 2022-02-14 NOTE — PROGRESS NOTES
[facility] Maternal Fetal [address] Fetal Cardiology Clinic    Today, I had the pleasure of evaluating Niesha Pierre who is now 27 y.o. and carrying her second pregnancy at 30 2/7 weeks gestation with an BRIDGER of 22. Her fetus has Shone complex.  She is carrying a male fetus, named James.      She recently had a second opinion at [facility]; their findings were similar to ours.      Obstetric History:    .  Her OB care is by Dr. Alarcon.  Her MFM care is by Dr. Mccrary.     Past Medical History:   Diagnosis Date    Depression     History of DVT of lower extremity 3/7/2019         Current Outpatient Medications:     enoxaparin (LOVENOX) 40 mg/0.4 mL Syrg, INJECT 0.4MLS INTO THE SKIN EVERY DAY, Disp: 12 mL, Rfl: 1    ondansetron (ZOFRAN-ODT) 4 MG TbDL, Take 1 tablet (4 mg total) by mouth every 8 (eight) hours as needed (nausea)., Disp: 30 tablet, Rfl: 2    prenatal vit calc,iron,folic (PRENATAL VITAMIN ORAL), Take 1 tablet by mouth., Disp: , Rfl:     Family History: Negative for congenital heart disease, early coronary artery disease, sudden unexplained death, connective tissues disorders, genetic syndromes, multiple miscarriages or other congenital anomalies.    Social History: Mrs. Pierre is  to the father of the baby.     FETAL ECHOCARDIOGRAM (summary):  Fetal echocardiogram at 30 2/7 weeks gestation for a history of Shone complex. BRIDGER 22.  Shone complex with low normal/mild mitral, aortic valve and mildaortic isthmus hypoplasia.  There is bidirectional movement of the atrial septum, with mainly right to left movement.  LSVC to a dilated coronary sinus.  Moderately dilated right atrium.  Mildly hypoplastic left atrium.  Mildly dilated RV.  Low normal size LV.  There appears to be subaortic tissue that is not obstructive at this time.  Possible small perimembranous VSD.  No right or left ventricle outflow tract obstruction.  Normal sinus rhythm.  No ectopy or  sustained arrhythmia demonstrated throughout the study.  Normal fetal atrial and ductal level shunting.  No ventricular level shunting.  Normal tricuspid and pulmonary valve structure and function.  Normal ductal arch.  Normal biventricular systolic function.  No pericardial effusion.  (Full report in electronic medical record)    Impression:  Single active male fetus at 30 wga.  The fetus has Shone complex, but happily the left sided structures measure low normal/milldy hypoplastic. There is also a a possible small perimembranous VSD.  We will need to follow the baby during gestation to ensure that the left heart structures continue to grow.  The left heart structures are now marginal.  If they continue at this size, hopefully the only intervention needed will be for a coarctation, which I think is likely.  However, as I told Mrs. Pierre today, there is a distinct possibility that the baby will not require any surgeries.       I discussed with her that fetal echocardiography is insufficiently sensitive to rule out all septal defects, anomalies of pulmonary and systemic veins, arch anomalies, and some valvar abnormalities, nor can it ensure that the ductus arteriosus and foramen ovale will spontaneously close.     Recommendations:  1. Prenatal cardiac diagnosis: Shone complex with mild mitral, aortic valve hypoplasia, and aortic isthmus hypoplasia.  Possible small perimembranous VSD.    2. Any prenatal genetic diagnoses or other major associated abnormalities, conditions: none  3. Primary Fetal Cardiologist: Pearl  Prenatal Recommendations:   1. Prenatal Cath MD Consult: No   2. Prenatal Congenital Heart Surgery Consult ? No   3. Follow up fetal cardiac evaluation in 4 weeks  Delivery and post  recommendations as of last fetal visit:  1. Recommend delivery at tertiary care center such as [facility] where Pediatric Cardiology and Congenital Heart Surgery care are immediately available  2. From fetal  cardiac perspective, ok for vaginal delivery, preferably after 38-39 weeks gestation  3. Recommend scheduled weekday delivery during daytime hours so that all needed caretakers are immediately available  4. PGE: have available at bedside   5. Likely need for urgent intervention within first hours after delivery: No   6. Cardiology Consultant recommended to attend delivery: No   7. Transition to NICU for arch watch  8. Cardiology consult: Yes  9.   Genetics recommendation: CMA  10.  surgical or cath intervention: coarctation repair    The above information was discussed in detail including the use of diagrams, with 60 minutes of total face to face time, with greater than 50% with counseling and coordination of care.  The discussion of the diagnosis and treatment options is as described above.      Christian Kang MD, MPH  Pediatric and Fetal Cardiology  [facility]   [address] [place] [address]    Office: [phone]  Cell: [phone]

## 2022-02-16 ENCOUNTER — PATIENT MESSAGE (OUTPATIENT)
Dept: MATERNAL FETAL MEDICINE | Facility: CLINIC | Age: 27
End: 2022-02-16
Payer: COMMERCIAL

## 2022-02-17 ENCOUNTER — OFFICE VISIT (OUTPATIENT)
Dept: MATERNAL FETAL MEDICINE | Facility: CLINIC | Age: 27
End: 2022-02-17
Payer: COMMERCIAL

## 2022-02-17 ENCOUNTER — PROCEDURE VISIT (OUTPATIENT)
Dept: MATERNAL FETAL MEDICINE | Facility: CLINIC | Age: 27
End: 2022-02-17
Attending: STUDENT IN AN ORGANIZED HEALTH CARE EDUCATION/TRAINING PROGRAM
Payer: COMMERCIAL

## 2022-02-17 VITALS
DIASTOLIC BLOOD PRESSURE: 66 MMHG | SYSTOLIC BLOOD PRESSURE: 102 MMHG | HEIGHT: 65 IN | BODY MASS INDEX: 25.53 KG/M2 | WEIGHT: 153.25 LBS

## 2022-02-17 DIAGNOSIS — O35.BXX0 ANOMALY OF HEART OF FETUS AFFECTING PREGNANCY, ANTEPARTUM, SINGLE OR UNSPECIFIED FETUS: ICD-10-CM

## 2022-02-17 DIAGNOSIS — Z36.89 ENCOUNTER FOR ULTRASOUND TO ASSESS FETAL GROWTH: Primary | ICD-10-CM

## 2022-02-17 PROCEDURE — 99999 PR PBB SHADOW E&M-EST. PATIENT-LVL III: CPT | Mod: PBBFAC,,, | Performed by: OBSTETRICS & GYNECOLOGY

## 2022-02-17 PROCEDURE — 76816 PR  US,PREGNANT UTERUS,F/U,TRANSABD APP: ICD-10-PCS | Mod: S$GLB,,, | Performed by: OBSTETRICS & GYNECOLOGY

## 2022-02-17 PROCEDURE — 99212 OFFICE O/P EST SF 10 MIN: CPT | Mod: 25,S$GLB,, | Performed by: OBSTETRICS & GYNECOLOGY

## 2022-02-17 PROCEDURE — 99212 PR OFFICE/OUTPT VISIT, EST, LEVL II, 10-19 MIN: ICD-10-PCS | Mod: 25,S$GLB,, | Performed by: OBSTETRICS & GYNECOLOGY

## 2022-02-17 PROCEDURE — 76816 OB US FOLLOW-UP PER FETUS: CPT | Mod: S$GLB,,, | Performed by: OBSTETRICS & GYNECOLOGY

## 2022-02-17 PROCEDURE — 99999 PR PBB SHADOW E&M-EST. PATIENT-LVL III: ICD-10-PCS | Mod: PBBFAC,,, | Performed by: OBSTETRICS & GYNECOLOGY

## 2022-02-17 NOTE — ASSESSMENT & PLAN NOTE
We discussed today's ultrasound and her recent fetal echo results  Fetal growth is stable    Plan:  - continue to follow with pediatric cardiology as scheduled  - growth assessment in 4-5 weeks with MFM  - no indication for antepartum surveillance at this time  - anticipate delivery at 39 weeks

## 2022-02-17 NOTE — PROGRESS NOTES
Maternal Fetal Medicine follow up consult    SUBJECTIVE:     Niesha Pierre is a 27 y.o.  female with IUP at 31w2d who is seen in follow up consultation by MFM.  Pregnancy complications include:   Problem   Fetal Cardiac Anomaly Affecting Pregnancy, Antepartum       Previous notes reviewed.   No changes to medical, surgical, family, social, or obstetric history.    Interval history since last MFM visit: no changes    Medications:  PNV    Care team members:  Domo Triana - Primary OB     OBJECTIVE:     Blood Pressure: 102/66  Weight: 69kg    Ultrasound performed. See viewpoint for full ultrasound report.  Fetal size is AGA with the EFW at the 20% and the AC at the 21%.  Known fetal cardiac anomalies again noted (Shone complex)  A limited repeat fetal anatomic survey is otherwise normal.   AFV is normal.     ASSESSMENT/PLAN:     27 y.o.  female with IUP at 31w2d     Fetal cardiac anomaly affecting pregnancy, antepartum  We discussed today's ultrasound and her recent fetal echo results  Fetal growth is stable    Plan:  - continue to follow with pediatric cardiology as scheduled  - growth assessment in 4-5 weeks with MFM  - no indication for antepartum surveillance at this time  - anticipate delivery at 39 weeks     Today I have spent 15 minutes in the care of the patient. This includes face to face time and non-face to face time preparing to see the patient (eg, review of tests), obtaining and/or reviewing separately obtained history, documenting clinical information in the electronic or other health record, independently interpreting results and communicating results to the patient/family/caregiver, or care coordination.     Leela Monet MD  Maternal Fetal Medicine fellow

## 2022-02-18 ENCOUNTER — TELEPHONE (OUTPATIENT)
Dept: OBSTETRICS AND GYNECOLOGY | Facility: CLINIC | Age: 27
End: 2022-02-18
Payer: COMMERCIAL

## 2022-02-18 NOTE — TELEPHONE ENCOUNTER
31 3/7 week OB pt states she has a stomach virus that 1 yo just got over.  C/o N/V/D.  Hasn't vomited since 5am, still having diarrhea. Now tolerating fluids.  Took Zofran 4mg around 8.  Reassurance given, recommended Imodium if diarrhea becomes uncontrollable, rest, and hydration. ED precautions discussed.

## 2022-03-02 ENCOUNTER — ROUTINE PRENATAL (OUTPATIENT)
Dept: OBSTETRICS AND GYNECOLOGY | Facility: CLINIC | Age: 27
End: 2022-03-02
Attending: STUDENT IN AN ORGANIZED HEALTH CARE EDUCATION/TRAINING PROGRAM
Payer: COMMERCIAL

## 2022-03-02 VITALS
WEIGHT: 153.69 LBS | BODY MASS INDEX: 25.57 KG/M2 | DIASTOLIC BLOOD PRESSURE: 60 MMHG | SYSTOLIC BLOOD PRESSURE: 100 MMHG

## 2022-03-02 DIAGNOSIS — Z3A.33 33 WEEKS GESTATION OF PREGNANCY: Primary | ICD-10-CM

## 2022-03-02 PROCEDURE — 99999 PR PBB SHADOW E&M-EST. PATIENT-LVL II: ICD-10-PCS | Mod: PBBFAC,,, | Performed by: NURSE PRACTITIONER

## 2022-03-02 PROCEDURE — 0502F SUBSEQUENT PRENATAL CARE: CPT | Mod: S$GLB,,, | Performed by: NURSE PRACTITIONER

## 2022-03-02 PROCEDURE — 0502F PR SUBSEQUENT PRENATAL CARE: ICD-10-PCS | Mod: S$GLB,,, | Performed by: NURSE PRACTITIONER

## 2022-03-02 PROCEDURE — 99999 PR PBB SHADOW E&M-EST. PATIENT-LVL II: CPT | Mod: PBBFAC,,, | Performed by: NURSE PRACTITIONER

## 2022-03-02 NOTE — PROGRESS NOTES
Presents at 33w1d for SABINE. Excellent FM, denies LOF, ctx, VB. Some diarrhea post gastroenteritis- recommend Probiotic yogurt, Imodium prn. Declines TDap today- toddler daughter with her. FU in 2 weeks for SABINE.

## 2022-03-07 ENCOUNTER — PATIENT MESSAGE (OUTPATIENT)
Dept: OBSTETRICS AND GYNECOLOGY | Facility: CLINIC | Age: 27
End: 2022-03-07
Payer: COMMERCIAL

## 2022-03-08 DIAGNOSIS — O35.BXX0 ANOMALY OF HEART OF FETUS AFFECTING PREGNANCY, ANTEPARTUM, SINGLE OR UNSPECIFIED FETUS: Primary | ICD-10-CM

## 2022-03-10 ENCOUNTER — OFFICE VISIT (OUTPATIENT)
Dept: PEDIATRIC CARDIOLOGY | Facility: CLINIC | Age: 27
End: 2022-03-10
Payer: COMMERCIAL

## 2022-03-10 ENCOUNTER — CLINICAL SUPPORT (OUTPATIENT)
Dept: PEDIATRIC CARDIOLOGY | Facility: CLINIC | Age: 27
End: 2022-03-10
Payer: COMMERCIAL

## 2022-03-10 VITALS
DIASTOLIC BLOOD PRESSURE: 72 MMHG | WEIGHT: 154.63 LBS | BODY MASS INDEX: 25.76 KG/M2 | SYSTOLIC BLOOD PRESSURE: 123 MMHG | HEIGHT: 65 IN | HEART RATE: 101 BPM

## 2022-03-10 DIAGNOSIS — O35.BXX0 ANOMALY OF HEART OF FETUS AFFECTING PREGNANCY, ANTEPARTUM, SINGLE OR UNSPECIFIED FETUS: ICD-10-CM

## 2022-03-10 DIAGNOSIS — O35.BXX0 ANOMALY OF HEART OF FETUS AFFECTING PREGNANCY, ANTEPARTUM, SINGLE OR UNSPECIFIED FETUS: Primary | ICD-10-CM

## 2022-03-10 PROCEDURE — 93325 DOPPLER ECHO COLOR FLOW MAPG: CPT | Mod: S$GLB,,, | Performed by: PEDIATRICS

## 2022-03-10 PROCEDURE — 76826 PR ECHO 2D W/WO M-MODE, FETAL, F/UP: ICD-10-PCS | Mod: S$GLB,,, | Performed by: PEDIATRICS

## 2022-03-10 PROCEDURE — 99999 PR PBB SHADOW E&M-EST. PATIENT-LVL I: CPT | Mod: PBBFAC,,,

## 2022-03-10 PROCEDURE — 99999 PR PBB SHADOW E&M-EST. PATIENT-LVL I: ICD-10-PCS | Mod: PBBFAC,,,

## 2022-03-10 PROCEDURE — 76828 ECHO EXAM OF FETAL HEART: CPT | Mod: S$GLB,,, | Performed by: PEDIATRICS

## 2022-03-10 PROCEDURE — 99215 OFFICE O/P EST HI 40 MIN: CPT | Mod: 25,S$GLB,, | Performed by: PEDIATRICS

## 2022-03-10 PROCEDURE — 99215 PR OFFICE/OUTPT VISIT, EST, LEVL V, 40-54 MIN: ICD-10-PCS | Mod: 25,S$GLB,, | Performed by: PEDIATRICS

## 2022-03-10 PROCEDURE — 99999 PR PBB SHADOW E&M-EST. PATIENT-LVL III: CPT | Mod: PBBFAC,,, | Performed by: PEDIATRICS

## 2022-03-10 PROCEDURE — 99999 PR PBB SHADOW E&M-EST. PATIENT-LVL III: ICD-10-PCS | Mod: PBBFAC,,, | Performed by: PEDIATRICS

## 2022-03-10 PROCEDURE — 93325 PR DOPPLER COLOR FLOW VELOCITY MAP: ICD-10-PCS | Mod: S$GLB,,, | Performed by: PEDIATRICS

## 2022-03-10 PROCEDURE — 76826 ECHO EXAM OF FETAL HEART: CPT | Mod: S$GLB,,, | Performed by: PEDIATRICS

## 2022-03-10 PROCEDURE — 76828 PR  SO2 FETAL HRT DOPPL F/U: ICD-10-PCS | Mod: S$GLB,,, | Performed by: PEDIATRICS

## 2022-03-14 ENCOUNTER — ROUTINE PRENATAL (OUTPATIENT)
Dept: OBSTETRICS AND GYNECOLOGY | Facility: CLINIC | Age: 27
End: 2022-03-14
Attending: STUDENT IN AN ORGANIZED HEALTH CARE EDUCATION/TRAINING PROGRAM
Payer: COMMERCIAL

## 2022-03-14 VITALS
WEIGHT: 153.31 LBS | DIASTOLIC BLOOD PRESSURE: 68 MMHG | SYSTOLIC BLOOD PRESSURE: 100 MMHG | BODY MASS INDEX: 25.52 KG/M2

## 2022-03-14 DIAGNOSIS — Z3A.34 34 WEEKS GESTATION OF PREGNANCY: Primary | ICD-10-CM

## 2022-03-14 PROCEDURE — 99999 PR PBB SHADOW E&M-EST. PATIENT-LVL II: CPT | Mod: PBBFAC,,, | Performed by: STUDENT IN AN ORGANIZED HEALTH CARE EDUCATION/TRAINING PROGRAM

## 2022-03-14 PROCEDURE — 99999 PR PBB SHADOW E&M-EST. PATIENT-LVL II: ICD-10-PCS | Mod: PBBFAC,,, | Performed by: STUDENT IN AN ORGANIZED HEALTH CARE EDUCATION/TRAINING PROGRAM

## 2022-03-14 PROCEDURE — 0502F SUBSEQUENT PRENATAL CARE: CPT | Mod: S$GLB,,, | Performed by: STUDENT IN AN ORGANIZED HEALTH CARE EDUCATION/TRAINING PROGRAM

## 2022-03-14 PROCEDURE — 0502F PR SUBSEQUENT PRENATAL CARE: ICD-10-PCS | Mod: S$GLB,,, | Performed by: STUDENT IN AN ORGANIZED HEALTH CARE EDUCATION/TRAINING PROGRAM

## 2022-03-14 RX ORDER — SERTRALINE HYDROCHLORIDE 25 MG/1
25 TABLET, FILM COATED ORAL DAILY
Qty: 30 TABLET | Refills: 2 | Status: SHIPPED | OUTPATIENT
Start: 2022-03-14 | End: 2022-07-14

## 2022-03-14 NOTE — PROGRESS NOTES
Denies edema.  Experiences abdominal & back cramping.  Moderate to severe lower abdominal/groin area pains.   Feels better on today, per patient. Increased FM.    Trace protein, + 1 leukocytes in urine.

## 2022-03-14 NOTE — PROGRESS NOTES
Doing well.  Reports some pelvic pressure. Reports fetal movement.  Denies contractions, leakage of fluid, vaginal bleeding.  SVE 1/T/H.  All questions answered.  RTC in 2 weeks or sooner if needed.

## 2022-03-15 ENCOUNTER — TELEPHONE (OUTPATIENT)
Dept: OBSTETRICS AND GYNECOLOGY | Facility: CLINIC | Age: 27
End: 2022-03-15
Payer: COMMERCIAL

## 2022-03-15 ENCOUNTER — PATIENT MESSAGE (OUTPATIENT)
Dept: MATERNAL FETAL MEDICINE | Facility: CLINIC | Age: 27
End: 2022-03-15
Payer: COMMERCIAL

## 2022-03-15 DIAGNOSIS — O35.BXX0 ANOMALY OF HEART OF FETUS AFFECTING PREGNANCY, ANTEPARTUM, SINGLE OR UNSPECIFIED FETUS: Primary | ICD-10-CM

## 2022-03-15 NOTE — TELEPHONE ENCOUNTER
----- Message from Tahmina Alarcon MD sent at 3/14/2022  7:59 PM CDT -----  Regarding: IOL  Please schedule IOL on 4/12/22 at [facility].  She is 1/50/-3.  Fetal cardiac defect.  Thank you!!

## 2022-03-15 NOTE — PROGRESS NOTES
[facility] Maternal Fetal [address] Fetal Cardiology Clinic    Today, I had the pleasure of evaluating Niesha Pierre who is now 27 y.o. and carrying her second pregnancy at 34 2/7 weeks gestation with an BRIDGER of 22. Her fetus has mild Shone complex.  She is carrying a male fetus, named James.      She recently had a second opinion at [facility]; their findings were similar to ours.      Obstetric History:    .  Her OB care is by Dr. Alarcon.  Her MFM care is by Dr. Mccrary.     Past Medical History:   Diagnosis Date    Depression     History of DVT of lower extremity 3/7/2019         Current Outpatient Medications:     enoxaparin (LOVENOX) 40 mg/0.4 mL Syrg, INJECT 0.4MLS INTO THE SKIN EVERY DAY, Disp: 12 mL, Rfl: 1    prenatal vit calc,iron,folic (PRENATAL VITAMIN ORAL), Take 1 tablet by mouth., Disp: , Rfl:     ondansetron (ZOFRAN-ODT) 4 MG TbDL, Take 1 tablet (4 mg total) by mouth every 8 (eight) hours as needed (nausea)., Disp: 30 tablet, Rfl: 2    sertraline (ZOLOFT) 25 MG tablet, Take 1 tablet (25 mg total) by mouth once daily., Disp: 30 tablet, Rfl: 2    Family History: Negative for congenital heart disease, early coronary artery disease, sudden unexplained death, connective tissues disorders, genetic syndromes, multiple miscarriages or other congenital anomalies.    Social History: Mrs. Pierre is  to the father of the baby.     FETAL ECHOCARDIOGRAM (summary):  Fetal echocardiogram at 34 2/7 weeks gestation for a history of Shone complex. BRIDGER 22.  Shone complex with low normal/mild mitral, aortic valve and aortic isthmus hypoplasia.  There is bidirectional movement of the atrial septum, with mainly right to left movement.  LSVC to a dilated coronary sinus.  Moderately dilated right atrium.  Normal left atrium.  Mildly dilated RV.  Low normal size LV.  There appears to be subaortic tissue that is not obstructive at this time.  Possible small perimembranous  VSD.  No right or left ventricle outflow tract obstruction.  Normal sinus rhythm.  No ectopy or sustained arrhythmia demonstrated throughout the study.  Normal fetal atrial and ductal level shunting.  No ventricular level shunting.  Normal tricuspid and pulmonary valve structure and function.  Normal ductal arch.  Normal biventricular systolic function.  No pericardial effusion.  (Full report in electronic medical record)    Impression:  Single active male fetus at 34 wga.  The fetus has Shone complex, but happily the left sided structures measure low normal/milldy hypoplastic. There is also a a possible small perimembranous VSD.   Today, the aortic isthmus appears normal and my hope is that we the baby will not need any interventions.  We will assess the baby after birth and have a clearer idea at that time.    I discussed with her that fetal echocardiography is insufficiently sensitive to rule out all septal defects, anomalies of pulmonary and systemic veins, arch anomalies, and some valvar abnormalities, nor can it ensure that the ductus arteriosus and foramen ovale will spontaneously close.     Recommendations:  1. Prenatal cardiac diagnosis: Shone complex with low normal/mildly hypoplastic mitral, aortic valve hypoplasia, and aortic isthmus hypoplasia, possible small VSD  2. Any prenatal genetic diagnoses or other major associated abnormalities, conditions: none  3. Primary Fetal Cardiologist: Pearl  Delivery and post kenrick recommendations as of last fetal visit:  1. Recommend delivery at tertiary care center such as [facility] where Pediatric Cardiology and Congenital Heart Surgery care are immediately available  2. From fetal cardiac perspective, ok for vaginal delivery, preferably after 38-39 weeks gestation  3. Recommend scheduled weekday delivery during daytime hours so that all needed caretakers are immediately available  4. PGE: have available at bedside   5. Likely need for urgent  intervention within first hours after delivery: No   6. Cardiology Consultant recommended to attend delivery: No   7. Transition to nursery  8. Cardiology consult: Yes  9.   Genetics recommendation: CMA  10.  surgical or cath intervention: likely none, possible coarctation repair    The above information was discussed in detail including the use of diagrams, with 60 minutes of total face to face time, with greater than 50% with counseling and coordination of care.  The discussion of the diagnosis and treatment options is as described above.      Christian Kang MD, MPH  Pediatric and Fetal Cardiology  [facility]   [address] [place] [id number]    Office: [phone]  Cell: [phone]

## 2022-03-16 ENCOUNTER — PROCEDURE VISIT (OUTPATIENT)
Dept: MATERNAL FETAL MEDICINE | Facility: CLINIC | Age: 27
End: 2022-03-16
Payer: COMMERCIAL

## 2022-03-16 ENCOUNTER — OFFICE VISIT (OUTPATIENT)
Dept: MATERNAL FETAL MEDICINE | Facility: CLINIC | Age: 27
End: 2022-03-16
Payer: COMMERCIAL

## 2022-03-16 ENCOUNTER — CONFERENCE (OUTPATIENT)
Dept: PEDIATRIC CARDIOLOGY | Facility: CLINIC | Age: 27
End: 2022-03-16
Payer: COMMERCIAL

## 2022-03-16 VITALS
BODY MASS INDEX: 25.61 KG/M2 | SYSTOLIC BLOOD PRESSURE: 122 MMHG | WEIGHT: 153.88 LBS | DIASTOLIC BLOOD PRESSURE: 78 MMHG

## 2022-03-16 DIAGNOSIS — Z36.89 ENCOUNTER FOR ULTRASOUND TO ASSESS FETAL GROWTH: ICD-10-CM

## 2022-03-16 DIAGNOSIS — O35.BXX0 ANOMALY OF HEART OF FETUS AFFECTING PREGNANCY, ANTEPARTUM, SINGLE OR UNSPECIFIED FETUS: ICD-10-CM

## 2022-03-16 PROCEDURE — 76816 OB US FOLLOW-UP PER FETUS: CPT | Mod: S$GLB,,, | Performed by: OBSTETRICS & GYNECOLOGY

## 2022-03-16 PROCEDURE — 99213 OFFICE O/P EST LOW 20 MIN: CPT | Mod: 25,S$GLB,, | Performed by: OBSTETRICS & GYNECOLOGY

## 2022-03-16 PROCEDURE — 99999 PR PBB SHADOW E&M-EST. PATIENT-LVL III: CPT | Mod: PBBFAC,,, | Performed by: OBSTETRICS & GYNECOLOGY

## 2022-03-16 PROCEDURE — 76816 PR  US,PREGNANT UTERUS,F/U,TRANSABD APP: ICD-10-PCS | Mod: S$GLB,,, | Performed by: OBSTETRICS & GYNECOLOGY

## 2022-03-16 PROCEDURE — 99999 PR PBB SHADOW E&M-EST. PATIENT-LVL III: ICD-10-PCS | Mod: PBBFAC,,, | Performed by: OBSTETRICS & GYNECOLOGY

## 2022-03-16 PROCEDURE — 99213 PR OFFICE/OUTPT VISIT, EST, LEVL III, 20-29 MIN: ICD-10-PCS | Mod: 25,S$GLB,, | Performed by: OBSTETRICS & GYNECOLOGY

## 2022-03-16 NOTE — PROGRESS NOTES
Obstetrical ultrasound and consultation completed today.  See full ultrasound report in imaging section of [employer].      Consultation  ==========    20 minutes of total time was spent on the encounter which included face-to-face time and non-face-to-face time preparing to see the patient,  obtaining and or reviewing separately obtained history, documenting clinical information in the electronic or other health record, independently  interpreting results (not separately reported) and communicating results to the patient, or care coordination (not separately reported).    Referring MD: Dr. Clark    Fetal DX: Shone complex    Prior investigations:  1. MFM ultrasounds - no non-cardiac malformations identified.  2. Fetal echo (Dr. Kang) - Shone complex  3. cfDNA testing - low risk for common fetal aneuploidies (amniocentesis declined)    Issues discussed today:  1. AGA fetal growth; no non-cardiac fetal malformations identified  2. DX of Shone complex --- being followed by Dr. Kang of Pediatric Cardiology  3. Delivery timing and mode of delivery -- ideally delivery will occur at ~ 39 weeks (scheduled on  by Dr. Clark). The fetal heart  abnormality is not an indication for delivery via . Mode of delivery should be determined by usual obstetric considerations.    Ultrasound Impression  =========    1. IUP - 35 and 1/7 weeks  2. AGA fetal size  3. Known fetal Shone complex

## 2022-03-21 ENCOUNTER — PATIENT MESSAGE (OUTPATIENT)
Dept: OBSTETRICS AND GYNECOLOGY | Facility: CLINIC | Age: 27
End: 2022-03-21
Payer: COMMERCIAL

## 2022-03-21 NOTE — TELEPHONE ENCOUNTER
Spoke with patient.  R/B/A reviewed and all questions answered.  She will let me know what she decides.

## 2022-03-28 ENCOUNTER — ROUTINE PRENATAL (OUTPATIENT)
Dept: OBSTETRICS AND GYNECOLOGY | Facility: CLINIC | Age: 27
End: 2022-03-28
Attending: STUDENT IN AN ORGANIZED HEALTH CARE EDUCATION/TRAINING PROGRAM
Payer: COMMERCIAL

## 2022-03-28 ENCOUNTER — LAB VISIT (OUTPATIENT)
Dept: LAB | Facility: HOSPITAL | Age: 27
End: 2022-03-28
Attending: STUDENT IN AN ORGANIZED HEALTH CARE EDUCATION/TRAINING PROGRAM
Payer: COMMERCIAL

## 2022-03-28 VITALS
SYSTOLIC BLOOD PRESSURE: 122 MMHG | BODY MASS INDEX: 25.77 KG/M2 | WEIGHT: 154.88 LBS | DIASTOLIC BLOOD PRESSURE: 74 MMHG

## 2022-03-28 DIAGNOSIS — R10.2 PELVIC PAIN AFFECTING PREGNANCY IN THIRD TRIMESTER, ANTEPARTUM: ICD-10-CM

## 2022-03-28 DIAGNOSIS — O22.30 DVT (DEEP VEIN THROMBOSIS) IN PREGNANCY: ICD-10-CM

## 2022-03-28 DIAGNOSIS — Z3A.34 34 WEEKS GESTATION OF PREGNANCY: Primary | ICD-10-CM

## 2022-03-28 DIAGNOSIS — O26.893 PELVIC PAIN AFFECTING PREGNANCY IN THIRD TRIMESTER, ANTEPARTUM: ICD-10-CM

## 2022-03-28 DIAGNOSIS — Z3A.36 36 WEEKS GESTATION OF PREGNANCY: ICD-10-CM

## 2022-03-28 LAB
BASOPHILS # BLD AUTO: 0.07 K/UL (ref 0–0.2)
BASOPHILS NFR BLD: 0.7 % (ref 0–1.9)
DIFFERENTIAL METHOD: ABNORMAL
EOSINOPHIL # BLD AUTO: 0.1 K/UL (ref 0–0.5)
EOSINOPHIL NFR BLD: 1 % (ref 0–8)
ERYTHROCYTE [DISTWIDTH] IN BLOOD BY AUTOMATED COUNT: 12.7 % (ref 11.5–14.5)
HCT VFR BLD AUTO: 33.6 % (ref 37–48.5)
HGB BLD-MCNC: 11.3 G/DL (ref 12–16)
IMM GRANULOCYTES # BLD AUTO: 0.06 K/UL (ref 0–0.04)
IMM GRANULOCYTES NFR BLD AUTO: 0.6 % (ref 0–0.5)
LYMPHOCYTES # BLD AUTO: 1.6 K/UL (ref 1–4.8)
LYMPHOCYTES NFR BLD: 15.5 % (ref 18–48)
MCH RBC QN AUTO: 28.3 PG (ref 27–31)
MCHC RBC AUTO-ENTMCNC: 33.6 G/DL (ref 32–36)
MCV RBC AUTO: 84 FL (ref 82–98)
MONOCYTES # BLD AUTO: 0.7 K/UL (ref 0.3–1)
MONOCYTES NFR BLD: 6.6 % (ref 4–15)
NEUTROPHILS # BLD AUTO: 7.8 K/UL (ref 1.8–7.7)
NEUTROPHILS NFR BLD: 75.6 % (ref 38–73)
NRBC BLD-RTO: 0 /100 WBC
PLATELET # BLD AUTO: 232 K/UL (ref 150–450)
PMV BLD AUTO: 10.4 FL (ref 9.2–12.9)
RBC # BLD AUTO: 3.99 M/UL (ref 4–5.4)
WBC # BLD AUTO: 10.29 K/UL (ref 3.9–12.7)

## 2022-03-28 PROCEDURE — 87389 HIV-1 AG W/HIV-1&-2 AB AG IA: CPT | Performed by: STUDENT IN AN ORGANIZED HEALTH CARE EDUCATION/TRAINING PROGRAM

## 2022-03-28 PROCEDURE — 0502F PR SUBSEQUENT PRENATAL CARE: ICD-10-PCS | Mod: S$GLB,,, | Performed by: STUDENT IN AN ORGANIZED HEALTH CARE EDUCATION/TRAINING PROGRAM

## 2022-03-28 PROCEDURE — 85025 COMPLETE CBC W/AUTO DIFF WBC: CPT | Performed by: STUDENT IN AN ORGANIZED HEALTH CARE EDUCATION/TRAINING PROGRAM

## 2022-03-28 PROCEDURE — 87081 CULTURE SCREEN ONLY: CPT | Performed by: STUDENT IN AN ORGANIZED HEALTH CARE EDUCATION/TRAINING PROGRAM

## 2022-03-28 PROCEDURE — 86592 SYPHILIS TEST NON-TREP QUAL: CPT | Performed by: STUDENT IN AN ORGANIZED HEALTH CARE EDUCATION/TRAINING PROGRAM

## 2022-03-28 PROCEDURE — 99999 PR PBB SHADOW E&M-EST. PATIENT-LVL III: ICD-10-PCS | Mod: PBBFAC,,, | Performed by: STUDENT IN AN ORGANIZED HEALTH CARE EDUCATION/TRAINING PROGRAM

## 2022-03-28 PROCEDURE — 0502F SUBSEQUENT PRENATAL CARE: CPT | Mod: S$GLB,,, | Performed by: STUDENT IN AN ORGANIZED HEALTH CARE EDUCATION/TRAINING PROGRAM

## 2022-03-28 PROCEDURE — 87086 URINE CULTURE/COLONY COUNT: CPT | Performed by: STUDENT IN AN ORGANIZED HEALTH CARE EDUCATION/TRAINING PROGRAM

## 2022-03-28 PROCEDURE — 99999 PR PBB SHADOW E&M-EST. PATIENT-LVL III: CPT | Mod: PBBFAC,,, | Performed by: STUDENT IN AN ORGANIZED HEALTH CARE EDUCATION/TRAINING PROGRAM

## 2022-03-28 NOTE — PROGRESS NOTES
Doing well.  No complaints.  Reports fetal movement.  Denies contractions, leakage of fluid, vaginal bleeding.  GBS and consents.  SVE 1/t/h.  VTX by U/S.  All questions answered.  RTC in 1 week or sooner if needed.

## 2022-03-29 LAB
HIV 1+2 AB+HIV1 P24 AG SERPL QL IA: NEGATIVE
RPR SER QL: NORMAL

## 2022-03-30 LAB — BACTERIA UR CULT: NO GROWTH

## 2022-03-31 LAB — BACTERIA SPEC AEROBE CULT: NORMAL

## 2022-04-04 ENCOUNTER — ROUTINE PRENATAL (OUTPATIENT)
Dept: OBSTETRICS AND GYNECOLOGY | Facility: CLINIC | Age: 27
End: 2022-04-04
Attending: STUDENT IN AN ORGANIZED HEALTH CARE EDUCATION/TRAINING PROGRAM
Payer: COMMERCIAL

## 2022-04-04 VITALS
WEIGHT: 153.75 LBS | BODY MASS INDEX: 25.59 KG/M2 | DIASTOLIC BLOOD PRESSURE: 82 MMHG | SYSTOLIC BLOOD PRESSURE: 130 MMHG

## 2022-04-04 DIAGNOSIS — Z3A.38 38 WEEKS GESTATION OF PREGNANCY: Primary | ICD-10-CM

## 2022-04-04 PROCEDURE — 99999 PR PBB SHADOW E&M-EST. PATIENT-LVL III: ICD-10-PCS | Mod: PBBFAC,,, | Performed by: STUDENT IN AN ORGANIZED HEALTH CARE EDUCATION/TRAINING PROGRAM

## 2022-04-04 PROCEDURE — 0502F PR SUBSEQUENT PRENATAL CARE: ICD-10-PCS | Mod: S$GLB,,, | Performed by: STUDENT IN AN ORGANIZED HEALTH CARE EDUCATION/TRAINING PROGRAM

## 2022-04-04 PROCEDURE — 99999 PR PBB SHADOW E&M-EST. PATIENT-LVL III: CPT | Mod: PBBFAC,,, | Performed by: STUDENT IN AN ORGANIZED HEALTH CARE EDUCATION/TRAINING PROGRAM

## 2022-04-04 PROCEDURE — 0502F SUBSEQUENT PRENATAL CARE: CPT | Mod: S$GLB,,, | Performed by: STUDENT IN AN ORGANIZED HEALTH CARE EDUCATION/TRAINING PROGRAM

## 2022-04-05 NOTE — PROGRESS NOTES
Doing well.  No complaints.  Reports fetal movement.  Denies contractions, leakage of fluid, vaginal bleeding.  SVE 1/t/h.  All questions answered.  RTC in 1 week or sooner if needed.

## 2022-04-09 ENCOUNTER — LAB VISIT (OUTPATIENT)
Dept: PRIMARY CARE CLINIC | Facility: CLINIC | Age: 27
End: 2022-04-09
Attending: STUDENT IN AN ORGANIZED HEALTH CARE EDUCATION/TRAINING PROGRAM
Payer: COMMERCIAL

## 2022-04-09 DIAGNOSIS — Z01.818 PRE-OP TESTING: ICD-10-CM

## 2022-04-09 LAB — SARS-COV-2 RNA RESP QL NAA+PROBE: NOT DETECTED

## 2022-04-09 PROCEDURE — U0003 INFECTIOUS AGENT DETECTION BY NUCLEIC ACID (DNA OR RNA); SEVERE ACUTE RESPIRATORY SYNDROME CORONAVIRUS 2 (SARS-COV-2) (CORONAVIRUS DISEASE [COVID-19]), AMPLIFIED PROBE TECHNIQUE, MAKING USE OF HIGH THROUGHPUT TECHNOLOGIES AS DESCRIBED BY CMS-2020-01-R: HCPCS | Performed by: STUDENT IN AN ORGANIZED HEALTH CARE EDUCATION/TRAINING PROGRAM

## 2022-04-09 PROCEDURE — U0005 INFEC AGEN DETEC AMPLI PROBE: HCPCS | Performed by: STUDENT IN AN ORGANIZED HEALTH CARE EDUCATION/TRAINING PROGRAM

## 2022-04-10 ENCOUNTER — ANESTHESIA EVENT (OUTPATIENT)
Dept: OBSTETRICS AND GYNECOLOGY | Facility: OTHER | Age: 27
End: 2022-04-10
Payer: COMMERCIAL

## 2022-04-10 ENCOUNTER — ANESTHESIA (OUTPATIENT)
Dept: OBSTETRICS AND GYNECOLOGY | Facility: OTHER | Age: 27
End: 2022-04-10
Payer: COMMERCIAL

## 2022-04-10 ENCOUNTER — HOSPITAL ENCOUNTER (INPATIENT)
Facility: OTHER | Age: 27
LOS: 1 days | Discharge: HOME | End: 2022-04-11
Attending: OBSTETRICS & GYNECOLOGY | Admitting: OBSTETRICS & GYNECOLOGY
Payer: COMMERCIAL

## 2022-04-10 DIAGNOSIS — O42.90 PROM (PREMATURE RUPTURE OF MEMBRANES): ICD-10-CM

## 2022-04-10 LAB
ABO + RH BLD: NORMAL
BASOPHILS # BLD AUTO: 0.04 K/UL (ref 0–0.2)
BASOPHILS NFR BLD: 0.4 % (ref 0–1.9)
BLD GP AB SCN CELLS X3 SERPL QL: NORMAL
DIFFERENTIAL METHOD: ABNORMAL
EOSINOPHIL # BLD AUTO: 0 K/UL (ref 0–0.5)
EOSINOPHIL NFR BLD: 0.3 % (ref 0–8)
ERYTHROCYTE [DISTWIDTH] IN BLOOD BY AUTOMATED COUNT: 12.6 % (ref 11.5–14.5)
HCT VFR BLD AUTO: 34.1 % (ref 37–48.5)
HGB BLD-MCNC: 11.4 G/DL (ref 12–16)
IMM GRANULOCYTES # BLD AUTO: 0.05 K/UL (ref 0–0.04)
IMM GRANULOCYTES NFR BLD AUTO: 0.5 % (ref 0–0.5)
LYMPHOCYTES # BLD AUTO: 2 K/UL (ref 1–4.8)
LYMPHOCYTES NFR BLD: 19.4 % (ref 18–48)
MCH RBC QN AUTO: 28 PG (ref 27–31)
MCHC RBC AUTO-ENTMCNC: 33.4 G/DL (ref 32–36)
MCV RBC AUTO: 84 FL (ref 82–98)
MONOCYTES # BLD AUTO: 0.8 K/UL (ref 0.3–1)
MONOCYTES NFR BLD: 7.7 % (ref 4–15)
NEUTROPHILS # BLD AUTO: 7.2 K/UL (ref 1.8–7.7)
NEUTROPHILS NFR BLD: 71.7 % (ref 38–73)
NRBC BLD-RTO: 0 /100 WBC
PLATELET # BLD AUTO: 235 K/UL (ref 150–450)
PMV BLD AUTO: 10.5 FL (ref 9.2–12.9)
RBC # BLD AUTO: 4.07 M/UL (ref 4–5.4)
WBC # BLD AUTO: 10.07 K/UL (ref 3.9–12.7)

## 2022-04-10 PROCEDURE — 27200710 HC EPIDURAL INFUSION PUMP SET: Performed by: ANESTHESIOLOGY

## 2022-04-10 PROCEDURE — 63600175 PHARM REV CODE 636 W HCPCS: Performed by: ANESTHESIOLOGY

## 2022-04-10 PROCEDURE — 72200005 HC VAGINAL DELIVERY LEVEL II

## 2022-04-10 PROCEDURE — 99285 EMERGENCY DEPT VISIT HI MDM: CPT | Mod: 25

## 2022-04-10 PROCEDURE — 59400 OBSTETRICAL CARE: CPT | Mod: QY,,, | Performed by: ANESTHESIOLOGY

## 2022-04-10 PROCEDURE — 51702 INSERT TEMP BLADDER CATH: CPT

## 2022-04-10 PROCEDURE — 72100002 HC LABOR CARE, 1ST 8 HOURS

## 2022-04-10 PROCEDURE — 25000003 PHARM REV CODE 250: Performed by: ANESTHESIOLOGY

## 2022-04-10 PROCEDURE — 59400 OBSTETRICAL CARE: CPT | Mod: ,,, | Performed by: STUDENT IN AN ORGANIZED HEALTH CARE EDUCATION/TRAINING PROGRAM

## 2022-04-10 PROCEDURE — 85025 COMPLETE CBC W/AUTO DIFF WBC: CPT | Performed by: STUDENT IN AN ORGANIZED HEALTH CARE EDUCATION/TRAINING PROGRAM

## 2022-04-10 PROCEDURE — 59025 FETAL NON-STRESS TEST: CPT

## 2022-04-10 PROCEDURE — 11000001 HC ACUTE MED/SURG PRIVATE ROOM

## 2022-04-10 PROCEDURE — 59400 PRA FULL ROUT OBSTE CARE,VAGINAL DELIV: ICD-10-PCS | Mod: QY,,, | Performed by: ANESTHESIOLOGY

## 2022-04-10 PROCEDURE — 86901 BLOOD TYPING SEROLOGIC RH(D): CPT | Performed by: STUDENT IN AN ORGANIZED HEALTH CARE EDUCATION/TRAINING PROGRAM

## 2022-04-10 PROCEDURE — 62326 NJX INTERLAMINAR LMBR/SAC: CPT | Performed by: ANESTHESIOLOGY

## 2022-04-10 PROCEDURE — C1751 CATH, INF, PER/CENT/MIDLINE: HCPCS | Performed by: ANESTHESIOLOGY

## 2022-04-10 PROCEDURE — 63600175 PHARM REV CODE 636 W HCPCS: Performed by: STUDENT IN AN ORGANIZED HEALTH CARE EDUCATION/TRAINING PROGRAM

## 2022-04-10 PROCEDURE — 59400 PR FULL ROUT OBSTE CARE,VAGINAL DELIV: ICD-10-PCS | Mod: ,,, | Performed by: STUDENT IN AN ORGANIZED HEALTH CARE EDUCATION/TRAINING PROGRAM

## 2022-04-10 PROCEDURE — 25000003 PHARM REV CODE 250

## 2022-04-10 RX ORDER — METHYLERGONOVINE MALEATE 0.2 MG/ML
INJECTION INTRAVENOUS
Status: DISCONTINUED
Start: 2022-04-10 | End: 2022-04-10 | Stop reason: WASHOUT

## 2022-04-10 RX ORDER — MISOPROSTOL 200 UG/1
800 TABLET ORAL
Status: DISCONTINUED | OUTPATIENT
Start: 2022-04-10 | End: 2022-04-11 | Stop reason: HOSPADM

## 2022-04-10 RX ORDER — SIMETHICONE 80 MG
1 TABLET,CHEWABLE ORAL 4 TIMES DAILY PRN
Status: DISCONTINUED | OUTPATIENT
Start: 2022-04-10 | End: 2022-04-11

## 2022-04-10 RX ORDER — CALCIUM CARBONATE 200(500)MG
500 TABLET,CHEWABLE ORAL 3 TIMES DAILY PRN
Status: DISCONTINUED | OUTPATIENT
Start: 2022-04-10 | End: 2022-04-11

## 2022-04-10 RX ORDER — METHYLERGONOVINE MALEATE 0.2 MG/ML
200 INJECTION INTRAVENOUS
Status: DISCONTINUED | OUTPATIENT
Start: 2022-04-10 | End: 2022-04-11 | Stop reason: HOSPADM

## 2022-04-10 RX ORDER — FENTANYL/BUPIVACAINE/NS/PF 2MCG/ML-.1
PLASTIC BAG, INJECTION (ML) INJECTION CONTINUOUS
Status: CANCELLED | OUTPATIENT
Start: 2022-04-10

## 2022-04-10 RX ORDER — BUPIVACAINE HYDROCHLORIDE 2.5 MG/ML
INJECTION, SOLUTION INFILTRATION; PERINEURAL CONTINUOUS PRN
Status: DISCONTINUED | OUTPATIENT
Start: 2022-04-10 | End: 2022-04-10

## 2022-04-10 RX ORDER — FENTANYL CITRATE 50 UG/ML
INJECTION, SOLUTION INTRAMUSCULAR; INTRAVENOUS
Status: DISCONTINUED | OUTPATIENT
Start: 2022-04-10 | End: 2022-04-10

## 2022-04-10 RX ORDER — BUPIVACAINE HYDROCHLORIDE 2.5 MG/ML
INJECTION, SOLUTION EPIDURAL; INFILTRATION; INTRACAUDAL
Status: DISPENSED
Start: 2022-04-10 | End: 2022-04-11

## 2022-04-10 RX ORDER — CARBOPROST TROMETHAMINE 250 UG/ML
250 INJECTION, SOLUTION INTRAMUSCULAR
Status: DISCONTINUED | OUTPATIENT
Start: 2022-04-10 | End: 2022-04-11 | Stop reason: HOSPADM

## 2022-04-10 RX ORDER — DIPHENOXYLATE HYDROCHLORIDE AND ATROPINE SULFATE 2.5; .025 MG/1; MG/1
1 TABLET ORAL 4 TIMES DAILY PRN
Status: DISCONTINUED | OUTPATIENT
Start: 2022-04-10 | End: 2022-04-11 | Stop reason: HOSPADM

## 2022-04-10 RX ORDER — MISOPROSTOL 200 UG/1
TABLET ORAL
Status: COMPLETED
Start: 2022-04-10 | End: 2022-04-10

## 2022-04-10 RX ORDER — PROCHLORPERAZINE EDISYLATE 5 MG/ML
5 INJECTION INTRAMUSCULAR; INTRAVENOUS EVERY 6 HOURS PRN
Status: DISCONTINUED | OUTPATIENT
Start: 2022-04-10 | End: 2022-04-11

## 2022-04-10 RX ORDER — SODIUM CHLORIDE, SODIUM LACTATE, POTASSIUM CHLORIDE, CALCIUM CHLORIDE 600; 310; 30; 20 MG/100ML; MG/100ML; MG/100ML; MG/100ML
INJECTION, SOLUTION INTRAVENOUS CONTINUOUS
Status: DISCONTINUED | OUTPATIENT
Start: 2022-04-10 | End: 2022-04-11

## 2022-04-10 RX ORDER — FENTANYL CITRATE 50 UG/ML
INJECTION, SOLUTION INTRAMUSCULAR; INTRAVENOUS
Status: COMPLETED
Start: 2022-04-10 | End: 2022-04-10

## 2022-04-10 RX ORDER — ENOXAPARIN SODIUM 100 MG/ML
40 INJECTION SUBCUTANEOUS EVERY 24 HOURS
Status: DISCONTINUED | OUTPATIENT
Start: 2022-04-11 | End: 2022-04-11

## 2022-04-10 RX ORDER — SERTRALINE HYDROCHLORIDE 25 MG/1
25 TABLET, FILM COATED ORAL DAILY
Status: DISCONTINUED | OUTPATIENT
Start: 2022-04-11 | End: 2022-04-11 | Stop reason: HOSPADM

## 2022-04-10 RX ORDER — ONDANSETRON 8 MG/1
8 TABLET, ORALLY DISINTEGRATING ORAL EVERY 8 HOURS PRN
Status: DISCONTINUED | OUTPATIENT
Start: 2022-04-10 | End: 2022-04-11

## 2022-04-10 RX ORDER — OXYTOCIN/RINGER'S LACTATE 30/500 ML
0-30 PLASTIC BAG, INJECTION (ML) INTRAVENOUS CONTINUOUS
Status: DISCONTINUED | OUTPATIENT
Start: 2022-04-10 | End: 2022-04-11

## 2022-04-10 RX ORDER — SODIUM CHLORIDE 9 MG/ML
INJECTION, SOLUTION INTRAVENOUS
Status: DISCONTINUED | OUTPATIENT
Start: 2022-04-10 | End: 2022-04-11

## 2022-04-10 RX ORDER — FENTANYL/BUPIVACAINE/NS/PF 2MCG/ML-.1
PLASTIC BAG, INJECTION (ML) INJECTION
Status: COMPLETED
Start: 2022-04-10 | End: 2022-04-10

## 2022-04-10 RX ORDER — OXYTOCIN/RINGER'S LACTATE 30/500 ML
95 PLASTIC BAG, INJECTION (ML) INTRAVENOUS ONCE
Status: COMPLETED | OUTPATIENT
Start: 2022-04-10 | End: 2022-04-10

## 2022-04-10 RX ORDER — LIDOCAINE HYDROCHLORIDE AND EPINEPHRINE 15; 5 MG/ML; UG/ML
INJECTION, SOLUTION EPIDURAL
Status: DISCONTINUED | OUTPATIENT
Start: 2022-04-10 | End: 2022-04-10

## 2022-04-10 RX ORDER — OXYTOCIN/RINGER'S LACTATE 30/500 ML
334 PLASTIC BAG, INJECTION (ML) INTRAVENOUS ONCE
Status: COMPLETED | OUTPATIENT
Start: 2022-04-10 | End: 2022-04-10

## 2022-04-10 RX ORDER — FAMOTIDINE 10 MG/ML
20 INJECTION INTRAVENOUS ONCE
Status: CANCELLED | OUTPATIENT
Start: 2022-04-10 | End: 2022-04-10

## 2022-04-10 RX ORDER — CARBOPROST TROMETHAMINE 250 UG/ML
INJECTION, SOLUTION INTRAMUSCULAR
Status: DISCONTINUED
Start: 2022-04-10 | End: 2022-04-10 | Stop reason: WASHOUT

## 2022-04-10 RX ORDER — TRANEXAMIC ACID 100 MG/ML
1000 INJECTION, SOLUTION INTRAVENOUS ONCE AS NEEDED
Status: DISCONTINUED | OUTPATIENT
Start: 2022-04-10 | End: 2022-04-11

## 2022-04-10 RX ORDER — CEFAZOLIN SODIUM 2 G/50ML
2 SOLUTION INTRAVENOUS ONCE AS NEEDED
Status: DISCONTINUED | OUTPATIENT
Start: 2022-04-10 | End: 2022-04-11

## 2022-04-10 RX ORDER — FENTANYL/BUPIVACAINE/NS/PF 2MCG/ML-.1
PLASTIC BAG, INJECTION (ML) INJECTION CONTINUOUS PRN
Status: DISCONTINUED | OUTPATIENT
Start: 2022-04-10 | End: 2022-04-10

## 2022-04-10 RX ORDER — SODIUM CITRATE AND CITRIC ACID MONOHYDRATE 334; 500 MG/5ML; MG/5ML
30 SOLUTION ORAL ONCE
Status: CANCELLED | OUTPATIENT
Start: 2022-04-10 | End: 2022-04-10

## 2022-04-10 RX ADMIN — FENTANYL CITRATE 100 MCG: 50 INJECTION, SOLUTION INTRAMUSCULAR; INTRAVENOUS at 05:04

## 2022-04-10 RX ADMIN — LIDOCAINE HYDROCHLORIDE,EPINEPHRINE BITARTRATE 3 ML: 15; .005 INJECTION, SOLUTION EPIDURAL; INFILTRATION; INTRACAUDAL; PERINEURAL at 05:04

## 2022-04-10 RX ADMIN — MISOPROSTOL 800 MCG: 200 TABLET ORAL at 08:04

## 2022-04-10 RX ADMIN — Medication 95 MILLI-UNITS/MIN: at 09:04

## 2022-04-10 RX ADMIN — SODIUM CHLORIDE, SODIUM LACTATE, POTASSIUM CHLORIDE, AND CALCIUM CHLORIDE: .6; .31; .03; .02 INJECTION, SOLUTION INTRAVENOUS at 03:04

## 2022-04-10 RX ADMIN — Medication 10 ML/HR: at 05:04

## 2022-04-10 RX ADMIN — Medication 5 ML: at 05:04

## 2022-04-10 RX ADMIN — BUPIVACAINE HYDROCHLORIDE 3 ML/HR: 2.5 INJECTION, SOLUTION INFILTRATION; PERINEURAL at 06:04

## 2022-04-10 RX ADMIN — Medication 334 MILLI-UNITS/MIN: at 08:04

## 2022-04-10 RX ADMIN — Medication 4 MILLI-UNITS/MIN: at 03:04

## 2022-04-10 NOTE — ANESTHESIA PROCEDURE NOTES
Epidural    Patient location during procedure: OB   Reason for block: primary anesthetic   Reason for block: labor analgesia requested by patient and obstetrician  Diagnosis: intrauterine pregnancy   Start time: 4/10/2022 5:15 PM  Timeout: 4/10/2022 5:05 PM  End time: 4/10/2022 5:27 PM    Staffing  Performing Provider: Kasie Walton MD  Authorizing Provider: Kasie Walton MD        Preanesthetic Checklist  Completed: patient identified, IV checked, site marked, risks and benefits discussed, surgical consent, monitors and equipment checked, pre-op evaluation, timeout performed, anesthesia consent given, hand hygiene performed and patient being monitored  Preparation  Patient position: sitting  Prep: ChloraPrep  Patient monitoring: Pulse Ox and Blood Pressure  Reason for block: primary anesthetic   Epidural  Skin Anesthetic: lidocaine 1%  Skin Wheal: 3 mL  Administration type: continuous  Approach: midline  Interspace: L3-4    Injection technique: LUKE air  Needle and Epidural Catheter  Needle type: Tuohy   Needle gauge: 17  Needle length: 3.5 inches  Needle insertion depth: 4 cm  Catheter type: springwound and multi-orifice  Catheter size: 19 G  Catheter at skin depth: 8 cm  Insertion Attempts: 1  Test dose: 3 mL of lidocaine 1.5% with Epi 1-to-200,000  Additional Documentation: incremental injection, no paresthesia on injection, negative aspiration for heme and CSF, no signs/symptoms of IV or SA injection and no significant pain on injection  Needle localization: anatomical landmarks  Medications:  Volume per aspiration: 5 mL  Time between aspirations: 5 minutes   Assessment  Ease of block: easy  Patient's tolerance of the procedure: comfortable throughout block and no complaints No inadvertent dural puncture with Tuohy.  Dural puncture not performed with spinal needle

## 2022-04-10 NOTE — H&P
HISTORY AND PHYSICAL                                                OBSTETRICS          Subjective:      Niesha Pierre is a 27 y.o.  female with IUP at 38w5d weeks gestation who presented to the [facility] with leakage of fluid and was found to be grossly ruptured. She reports possible LOF starting at 0230 with larger gush of clear fluid at 10:30 today.    Patient denies any painful contractions or vaginal bleeding. Endorses good FM.     This IUP is complicated by fetus with mild shone complex, h/o LLE DVT (unprovoked), and anxiety.    Review of Systems   Constitutional: Negative for chills, fatigue and fever.   HENT: Negative for nasal congestion.    Eyes: Negative for visual disturbance.   Respiratory: Negative for cough and shortness of breath.    Cardiovascular: Negative for chest pain and leg swelling.   Gastrointestinal: Negative for abdominal pain, nausea and vomiting.   Genitourinary: Positive for vaginal discharge. Negative for dysuria, pelvic pain and vaginal bleeding.   Musculoskeletal: Negative for back pain.   Integumentary:  Negative for rash.   Neurological: Negative for headaches.       PMHx:   Past Medical History:   Diagnosis Date    Depression     History of DVT of lower extremity 3/7/2019       PSHx: History reviewed. No pertinent surgical history.    All: Review of patient's allergies indicates:  No Known Allergies    Meds:   Medications Prior to Admission   Medication Sig Dispense Refill Last Dose    enoxaparin (LOVENOX) 40 mg/0.4 mL Syrg INJECT 0.4MLS INTO THE SKIN EVERY DAY 12 mL 1     ondansetron (ZOFRAN-ODT) 4 MG TbDL Take 1 tablet (4 mg total) by mouth every 8 (eight) hours as needed (nausea). 30 tablet 2     prenatal vit calc,iron,folic (PRENATAL VITAMIN ORAL) Take 1 tablet by mouth.       sertraline (ZOLOFT) 25 MG tablet Take 1 tablet (25 mg total) by mouth once daily. 30 tablet 2        SH:   Social History     Socioeconomic History    Marital status:     Tobacco Use    Smoking status: Former Smoker     Quit date: 2013     Years since quittin.0    Smokeless tobacco: Never Used   Substance and Sexual Activity    Alcohol use: Not Currently     Alcohol/week: 18.0 standard drinks     Types: 18 Cans of beer per week    Drug use: No    Sexual activity: Yes     Partners: Male     Birth control/protection: None       FH:   Family History   Problem Relation Age of Onset    Hypertension Mother     Hyperlipidemia Mother     Hypertension Father     Hyperlipidemia Father     Hypertension Maternal Uncle     Hypertension Paternal Uncle     Stroke Maternal Grandfather     Diabetes Paternal Grandmother     Cancer Paternal Grandfather 80        Pancreatic       OBHx:   OB History    Para Term  AB Living   2 1 1 0 0 1   SAB IAB Ectopic Multiple Live Births   0 0 0 0 1      # Outcome Date GA Lbr Akin/2nd Weight Sex Delivery Anes PTL Lv   2 Current            1 Term 19 39w1d  3.062 kg (6 lb 12 oz) F Vag-Spont EPI N SHEFALI      Name: Tori       Apgar1: 9  Apgar5: 9       Objective:       /81 (BP Location: Left arm, Patient Position: Lying)   Pulse 108   Temp 98.4 °F (36.9 °C) (Oral)   Resp 16   LMP 2021 (Exact Date)   SpO2 100%     Vitals:    04/10/22 1415   BP: 123/81   BP Location: Left arm   Patient Position: Lying   Pulse: 108   Resp: 16   Temp: 98.4 °F (36.9 °C)   TempSrc: Oral   SpO2: 100%       General: NAD, alert, oriented, cooperative  HEENT: NCAT, EOM grossly intact  Lungs: Normal WOB  Heart: regular rate  Abdomen: gravid, soft, nondistended, nontender, no rebound or guarding  Extremities: no edema    FHT: 135 bpm, moderate va, +accels, -decels; Cat 1 (reassuring)  [place]: rare contractions  Presentation: cephalic by ultrasound    Cervix: 3/70/-3  SSE: grossly ruptured with clear fluid, +pooling/valsalva, + nitrazine    EFW by [facility]: 6.5#    Maternal pelvis proven to 3062g    Recent Growth Scan: 2450g, 22%ile @ 35w1d  (3/16/2021)    Lab Review  Blood Type A POS  GBBS: negative  Rubella: Immune  RPR: NR  HIV: negative  HepB: negative       Assessment:       38w5d weeks gestation with PROM.    Active Hospital Problems    Diagnosis  POA    *PROM (premature rupture of membranes) [O42.90]  Yes    Fetal cardiac anomaly affecting pregnancy, antepartum [O35.8XX0]  Yes     Shone complex      History of DVT of lower extremity [Z86.718]  Not Applicable     Unprovoked left popliteal DVT diagnosed 2019  Heme work-up negative for inherited thrombophilias and APS  Paternal uncle with hx of upper extremity DVT, he has known factor V  Therapeutic apixaban x 2 mos, IUP diagnosed 2019  Managed with enoxaparin 40 mg daily antepartum and 12 weeks postpartum in 2019 pregnancy        Resolved Hospital Problems   No resolved problems to display.       Plan:     1. PROM:  - Patient grossly ruptured on examination with clear fluid, will admit for labor augmentation  - Risks, benefits, alternatives and possible complications have been discussed in detail with the patient  - Consents signed and to chart  - Plan for pitocin for augmentation  - Epidural per Anesthesia  - Draw CBC, T&S  - Notify Staff  - Recheck in 2-4 hrs or PRN  - COVID neg  - Post-Partum Hemorrhage risk - low  - Postpartum lovenox: yes, x 6 weeks    2. Fetal mild shone complex:  - Low risk Mat21  - Followed by Peds Cards during pregnancy  - Per notes from Dr. Kang:   - Echo demonstrates: Shone complex with low normal/mildly hypoplastic mitral, aortic valve hypoplasia, and aortic isthmus hypoplasia, possible small VSD   - No indication for  delivery   - Have PGE available at delivery   - Low likelihood of  surgical/cath intervention at delivery  - NICU alerted on admission    3. H/o DVT:  - H/o unprovoked left popliteal DVT in 2019  - Has been followed by MFM this pregnancy  - Heme work-up negative for inherited thrombophilias and APS  - Paternal uncle with hx of upper  extremity DVT, he has known factor V  - Has been on prophylactic Lovenox this pregnancy, last dose on 4/9 @ 2030  - Will continue prophylactic Lovenox x 6 weeks PP per [facility] recommendations    4. Anxiety:  - Previously on Zoloft  - Patient requested to restart on admission  - Recommend 1-2 week mood check on D/C      Comfort Arana M.D.  OB/GYN PGY-3

## 2022-04-10 NOTE — PROGRESS NOTES
"LABOR NOTE    S:  Complaints: No.  Epidural working:  Yes.   Resident to bedside for routine cervical exam.     O: /81   Pulse 84   Temp 98.3 °F (36.8 °C) (Oral)   Resp 16   Ht 5' 5" (1.651 m)   Wt 69.7 kg (153 lb 12.3 oz)   LMP 2021 (Exact Date)   SpO2 98%   Breastfeeding No   BMI 25.59 kg/m²       FHT: 125 bpm, mod variability, + variable/early decelerations; Cat 2 (reassuring)  CTX: q 2-3 minutes  SVE: /-2, pitocin at 4 mU/min       ASSESSMENT:   27 y.o.  at 38w5d, PROM    FHT reassuring    Active Hospital Problems    Diagnosis  POA    *PROM (premature rupture of membranes) [O42.90]  Yes    Fetal cardiac anomaly affecting pregnancy, antepartum [O35.8XX0]  Yes     Shone complex      History of DVT of lower extremity [Z86.718]  Not Applicable     Unprovoked left popliteal DVT diagnosed 2019  Heme work-up negative for inherited thrombophilias and APS  Paternal uncle with hx of upper extremity DVT, he has known factor V  Therapeutic apixaban x 2 mos, IUP diagnosed 2019  Managed with enoxaparin 40 mg daily antepartum and 12 weeks postpartum in 2019 pregnancy        Resolved Hospital Problems   No resolved problems to display.     TIMELINE:   1830: /-2, pitocin at 4 mU/min     PLAN:    Continue Close Maternal/Fetal Monitoring  Pitocin Augmentation per protocol  Recheck 4 hours or PRN    Sweta Da Silva MD/MPH  OB/GYN PGY-1   [facility]     "

## 2022-04-10 NOTE — ED PROVIDER NOTES
Encounter Date: 4/10/2022       History     Chief Complaint   Patient presents with    Rupture of Membranes     Niesha Pierre is a 27 y.o. [id number] at 38w5d presents complaining of possible LOF. She reports a small amount of clear vaginal discharge starting at 0230 today with a larger gush of clear fluid at 10:30. She has been leaking continously since that time.     Patient denies any painful contractions or vaginal bleeding. Endorses good FM.      This IUP is complicated by fetus with mild shone complex, h/o LLE DVT (unprovoked), and anxiety.        Review of patient's allergies indicates:  No Known Allergies  Past Medical History:   Diagnosis Date    Depression     History of DVT of lower extremity 3/7/2019     History reviewed. No pertinent surgical history.  Family History   Problem Relation Age of Onset    Hypertension Mother     Hyperlipidemia Mother     Hypertension Father     Hyperlipidemia Father     Hypertension Maternal Uncle     Hypertension Paternal Uncle     Stroke Maternal Grandfather     Diabetes Paternal Grandmother     Cancer Paternal Grandfather 80        Pancreatic     Social History     Tobacco Use    Smoking status: Former Smoker     Quit date: 2013     Years since quittin.0    Smokeless tobacco: Never Used   Substance Use Topics    Alcohol use: Not Currently     Alcohol/week: 18.0 standard drinks     Types: 18 Cans of beer per week    Drug use: No     Review of Systems   Constitutional: Negative for chills, fatigue and fever.   HENT: Negative for congestion.    Eyes: Negative for visual disturbance.   Respiratory: Negative for shortness of breath.    Cardiovascular: Negative for chest pain.   Gastrointestinal: Negative for abdominal pain, nausea and vomiting.   Genitourinary: Positive for vaginal discharge (clear LOF). Negative for dysuria, pelvic pain and vaginal bleeding.   Musculoskeletal: Negative for back pain.   Skin: Negative for rash.   Neurological:  Negative for headaches.       Physical Exam     Initial Vitals [04/10/22 1415]   BP Pulse Resp Temp SpO2   123/81 108 16 98.4 °F (36.9 °C) 100 %      MAP       --         Physical Exam    Vitals reviewed.  Constitutional: She appears well-developed and well-nourished. No distress.   HENT:   Head: Normocephalic and atraumatic.   Eyes: EOM are normal.   Neck:   Normal range of motion.  Cardiovascular: Normal rate.   Pulmonary/Chest: Breath sounds normal.   Abdominal: Abdomen is soft. There is no abdominal tenderness.   Gravid uterus, fundus non-TTP There is no rebound and no guarding.   Musculoskeletal:         General: No edema. Normal range of motion.      Cervical back: Normal range of motion.     Neurological: She is alert and oriented to person, place, and time.   Skin: Skin is warm and dry.   Psychiatric: She has a normal mood and affect. Thought content normal.     OB LABOR EXAM:     Membranes ruptured: Yes.   Method: Sterile vaginal exam per MD and Sterile speculum exam per MD.   Vaginal Bleeding: none present.     Dilatation: 3.   Station: -3.   Effacement: 70%.   Amniotic Fluid Color: clear.   Amniotic Fluid Amount: moderate.   Comments:   Cervix: 3/70/-3  SSE: grossly ruptured with clear fluid, +pooling/valsalva, + nitrazine         ED Course   Obtain Fetal nonstress test (NST)    Date/Time: 4/10/2022 5:28 PM  Performed by: Comfort Arana MD  Authorized by: Comfort Arana MD     Nonstress Test:     Variability:  6-25 BPM    Decelerations:  None    Accelerations:  15 bpm    Baseline:  135    Uterine Irritability: No      Contractions:  Irregular  Biophysical Profile:     Nonstress Test Interpretation: reactive      Overall Impression:  Reassuring      Labs Reviewed - No data to display       Imaging Results    None          Medications   sertraline tablet 25 mg (has no administration in time range)   lactated ringers bolus 1,000 mL (has no administration in time range)   lactated ringers bolus 500 mL  (has no administration in time range)   lactated ringers infusion ( Intravenous New Bag 4/10/22 5234)   0.9%  NaCl infusion (has no administration in time range)   carboprost injection 250 mcg (has no administration in time range)   methylergonovine injection 200 mcg (has no administration in time range)   miSOPROStoL tablet 800 mcg (has no administration in time range)   diphenoxylate-atropine 2.5-0.025 mg per tablet 1 tablet (has no administration in time range)   tranexamic acid (CYKLOKAPRON) 1,000 mg in sodium chloride 0.9 % 100 mL (ready to mix system) (has no administration in time range)   ondansetron disintegrating tablet 8 mg (has no administration in time range)   prochlorperazine injection Soln 5 mg (has no administration in time range)   calcium carbonate 200 mg calcium (500 mg) chewable tablet 500 mg (has no administration in time range)   simethicone chewable tablet 80 mg (has no administration in time range)   azithromycin 500 mg in dextrose 5 % 250 mL IVPB (ready to mix system) (has no administration in time range)   cefazolin (ANCEF) 2 gram in dextrose 5% 50 mL IVPB (premix) (has no administration in time range)   oxytocin 30 units in 500 mL lactated ringers infusion (titrating) (4 nadiya-units/min Intravenous New Bag 4/10/22 1556)   BUPivacaine (PF) 0.25% (2.5 mg/ml) (MARCAINE) 0.25 % (2.5 mg/mL) injection (has no administration in time range)   BUPivacaine (PF) 0.25% (2.5 mg/ml) (MARCAINE) 0.25 % (2.5 mg/mL) injection (has no administration in time range)   enoxaparin injection 40 mg (has no administration in time range)   oxytocin 30 units in 500 mL lactated ringers infusion (non-titrating) (0 nadiya-units/min Intravenous Stopped 4/10/22 2101)   oxytocin 30 units in 500 mL lactated ringers infusion (non-titrating) (95 nadiya-units/min Intravenous New Bag 4/10/22 2116)   fentaNYL (SUBLIMAZE) 50 mcg/mL injection (  Override pull for Anesthesia 4/10/22 1733)   fentanyl 2mcg/mL with BUPivacaine 0.1% in  sdoium chloride 0.9% Epidural 2 mcg/mL- 0.1 % Soln (  Override pull for Anesthesia 4/10/22 1733)   miSOPROStoL (CYTOTEC) 200 MCG tablet (800 mcg Rectal Given 4/10/22 2033)     Medical Decision Making:   Initial Assessment:   Niesha Pierre is a 27 y.o. [id number] at 38w5d presents complaining of possible LOF.    ED Management:  VSS. NST reactive and reassuring. Patient found to be grossly ruptured with clear fluid on examination. SVE 3/70/-3.    Will admit for labor augmentation in the setting of PROM. Consents previously signed per Dr. Alarcon. Fetus cephalic by BSUS. Last dose of Lovenox > 12 hours PTA. Full H&P to follow.  Other:   I have discussed this case with another health care provider.       <> Summary of the Discussion: Dr. Trinh                      Clinical Impression:   Final diagnoses:  [O42.90] PROM (premature rupture of membranes)          ED Disposition Condition    Send to L&D           Comfort Arana M.D.  OB/GYN PGY-3        Comfort Arana MD  Resident  04/10/22 8425

## 2022-04-10 NOTE — ANESTHESIA PREPROCEDURE EVALUATION
[facility]  Anesthesia Pre-Operative Evaluation         Patient Name: Niesha Pierre  YOB: 1995  MRN: [id number]    04/10/2022      Niesha Pierre is a 27 y.o. female  @ 38w5d who presents in normal labor. Has hx of DVT on ppx Lovenox (40mg) last dose taken  at 8:30pm. Denies cardiopulmonary or spine disorders. Of note, experienced PDPH after last epidural requiring blood patch.    OB History    Para Term  AB Living   2 1 1     1   SAB IAB Ectopic Multiple Live Births         0 1      # Outcome Date GA Lbr Akin/2nd Weight Sex Delivery Anes PTL Lv   2 Current            1 Term 19 39w1d  3.062 kg (6 lb 12 oz) F Vag-Spont EPI N SHEFALI       Review of patient's allergies indicates:  No Known Allergies    Wt Readings from Last 1 Encounters:   04/10/22 1415 69.7 kg (153 lb 12.3 oz)       BP Readings from Last 3 Encounters:   04/10/22 135/81   22 130/82   22 122/74       Patient Active Problem List   Diagnosis    Vitamin D insufficiency    History of DVT of lower extremity    Fetal cardiac anomaly affecting pregnancy, antepartum    PROM (premature rupture of membranes)       History reviewed. No pertinent surgical history.    Social History     Socioeconomic History    Marital status:    Tobacco Use    Smoking status: Former Smoker     Quit date: 2013     Years since quittin.0    Smokeless tobacco: Never Used   Substance and Sexual Activity    Alcohol use: Not Currently     Alcohol/week: 18.0 standard drinks     Types: 18 Cans of beer per week    Drug use: No    Sexual activity: Yes     Partners: Male     Birth control/protection: None         Chemistry        Component Value Date/Time     2021 0551    K 4.0 2021 0551     2021 0551    CO2 25 2021 0551    BUN 18 (H) 2021 0551    CREATININE 0.68 2021 0551     (H) 2021 0551        Component Value  Date/Time    CALCIUM 9.7 04/09/2021 0551    ALKPHOS 86 04/09/2021 0551    AST 31 04/09/2021 0551    ALT 33 04/09/2021 0551    BILITOT 0.6 04/09/2021 0551    ESTGFRAFRICA >60.0 04/09/2021 0551    EGFRNONAA >60.0 04/09/2021 0551            Lab Results   Component Value Date    WBC 10.07 04/10/2022    HGB 11.4 (L) 04/10/2022    HCT 34.1 (L) 04/10/2022    MCV 84 04/10/2022     04/10/2022       No results for input(s): PT, INR, PROTIME, APTT in the last 72 hours.                                                                                                                 04/10/2022  Niesha Pierre is a 27 y.o., female.      Pre-op Assessment    I have reviewed the Patient Summary Reports.     I have reviewed the Nursing Notes.    I have reviewed the Medications.     Review of Systems  Anesthesia Hx:  No problems with previous Anesthesia   Denies Personal Hx of Anesthesia complications.   Social:  Former Smoker, No Alcohol Use    Hematology/Oncology:  Hematology Normal   Oncology Normal     EENT/Dental:EENT/Dental Normal   Cardiovascular:   DVT, last lovenox dose at 20:30 4/9/22   Pulmonary:  Pulmonary Normal    Renal/:  Renal/ Normal     Hepatic/GI:  Hepatic/GI Normal    Musculoskeletal:  Musculoskeletal Normal    Neurological:  Neurology Normal    Endocrine:  Endocrine Normal    Psych:   anxiety depression          Physical Exam  General: Well nourished, Cooperative, Alert and Oriented    Airway:  Mallampati: I   Mouth Opening: Normal  TM Distance: Normal  Tongue: Normal  Neck ROM: Normal ROM    Dental:  Intact    Chest/Lungs:  Normal Respiratory Rate    Heart:  Rate: Normal  Rhythm: Regular Rhythm    Abdomen:Gravid uterus      Anesthesia Plan  Type of Anesthesia, risks & benefits discussed:    Anesthesia Type: Gen ETT, Epidural, Spinal, CSE  Intra-op Monitoring Plan: Standard ASA Monitors  Post Op Pain Control Plan: multimodal analgesia, IV/PO Opioids PRN and epidural analgesia  Informed Consent:  Informed consent signed with the Patient and all parties understand the risks and agree with anesthesia plan.  All questions answered.   ASA Score: 2  Day of Surgery Review of History & Physical: H&P Update referred to the surgeon/provider.    Ready For Surgery From Anesthesia Perspective.     .

## 2022-04-11 VITALS
SYSTOLIC BLOOD PRESSURE: 113 MMHG | RESPIRATION RATE: 18 BRPM | WEIGHT: 153.75 LBS | DIASTOLIC BLOOD PRESSURE: 81 MMHG | OXYGEN SATURATION: 96 % | TEMPERATURE: 98 F | HEART RATE: 77 BPM | HEIGHT: 65 IN | BODY MASS INDEX: 25.62 KG/M2

## 2022-04-11 LAB
BASOPHILS # BLD AUTO: 0.04 K/UL (ref 0–0.2)
BASOPHILS NFR BLD: 0.3 % (ref 0–1.9)
DIFFERENTIAL METHOD: ABNORMAL
EOSINOPHIL # BLD AUTO: 0.1 K/UL (ref 0–0.5)
EOSINOPHIL NFR BLD: 0.6 % (ref 0–8)
ERYTHROCYTE [DISTWIDTH] IN BLOOD BY AUTOMATED COUNT: 12.6 % (ref 11.5–14.5)
HCT VFR BLD AUTO: 32 % (ref 37–48.5)
HGB BLD-MCNC: 10.5 G/DL (ref 12–16)
IMM GRANULOCYTES # BLD AUTO: 0.05 K/UL (ref 0–0.04)
IMM GRANULOCYTES NFR BLD AUTO: 0.4 % (ref 0–0.5)
LYMPHOCYTES # BLD AUTO: 2.5 K/UL (ref 1–4.8)
LYMPHOCYTES NFR BLD: 18.9 % (ref 18–48)
MCH RBC QN AUTO: 28.1 PG (ref 27–31)
MCHC RBC AUTO-ENTMCNC: 32.8 G/DL (ref 32–36)
MCV RBC AUTO: 86 FL (ref 82–98)
MONOCYTES # BLD AUTO: 1 K/UL (ref 0.3–1)
MONOCYTES NFR BLD: 7.4 % (ref 4–15)
NEUTROPHILS # BLD AUTO: 9.4 K/UL (ref 1.8–7.7)
NEUTROPHILS NFR BLD: 72.4 % (ref 38–73)
NRBC BLD-RTO: 0 /100 WBC
PLATELET # BLD AUTO: 183 K/UL (ref 150–450)
PMV BLD AUTO: 10.4 FL (ref 9.2–12.9)
RBC # BLD AUTO: 3.74 M/UL (ref 4–5.4)
WBC # BLD AUTO: 12.97 K/UL (ref 3.9–12.7)

## 2022-04-11 PROCEDURE — 25000003 PHARM REV CODE 250: Performed by: STUDENT IN AN ORGANIZED HEALTH CARE EDUCATION/TRAINING PROGRAM

## 2022-04-11 PROCEDURE — 99024 POSTOP FOLLOW-UP VISIT: CPT | Mod: ,,, | Performed by: OBSTETRICS & GYNECOLOGY

## 2022-04-11 PROCEDURE — 99024 PR POST-OP FOLLOW-UP VISIT: ICD-10-PCS | Mod: ,,, | Performed by: OBSTETRICS & GYNECOLOGY

## 2022-04-11 PROCEDURE — 63600175 PHARM REV CODE 636 W HCPCS: Performed by: OBSTETRICS & GYNECOLOGY

## 2022-04-11 PROCEDURE — 85025 COMPLETE CBC W/AUTO DIFF WBC: CPT | Performed by: STUDENT IN AN ORGANIZED HEALTH CARE EDUCATION/TRAINING PROGRAM

## 2022-04-11 PROCEDURE — 36415 COLL VENOUS BLD VENIPUNCTURE: CPT | Performed by: STUDENT IN AN ORGANIZED HEALTH CARE EDUCATION/TRAINING PROGRAM

## 2022-04-11 RX ORDER — HYDROCODONE BITARTRATE AND ACETAMINOPHEN 5; 325 MG/1; MG/1
1 TABLET ORAL EVERY 4 HOURS PRN
Qty: 7 TABLET | Refills: 0 | Status: SHIPPED | OUTPATIENT
Start: 2022-04-11 | End: 2023-08-22

## 2022-04-11 RX ORDER — HYDROCODONE BITARTRATE AND ACETAMINOPHEN 10; 325 MG/1; MG/1
1 TABLET ORAL EVERY 4 HOURS PRN
Status: DISCONTINUED | OUTPATIENT
Start: 2022-04-11 | End: 2022-04-11 | Stop reason: HOSPADM

## 2022-04-11 RX ORDER — HYDROXYZINE PAMOATE 25 MG/1
25 CAPSULE ORAL EVERY 8 HOURS PRN
Qty: 30 CAPSULE | Refills: 0 | Status: SHIPPED | OUTPATIENT
Start: 2022-04-11 | End: 2023-08-22

## 2022-04-11 RX ORDER — IBUPROFEN 600 MG/1
600 TABLET ORAL EVERY 8 HOURS PRN
Qty: 30 TABLET | Refills: 0 | Status: SHIPPED | OUTPATIENT
Start: 2022-04-11

## 2022-04-11 RX ORDER — HYDROCODONE BITARTRATE AND ACETAMINOPHEN 5; 325 MG/1; MG/1
1 TABLET ORAL EVERY 4 HOURS PRN
Status: DISCONTINUED | OUTPATIENT
Start: 2022-04-11 | End: 2022-04-11 | Stop reason: HOSPADM

## 2022-04-11 RX ORDER — ACETAMINOPHEN 325 MG/1
650 TABLET ORAL EVERY 6 HOURS PRN
Status: DISCONTINUED | OUTPATIENT
Start: 2022-04-11 | End: 2022-04-11 | Stop reason: HOSPADM

## 2022-04-11 RX ORDER — SODIUM CHLORIDE 0.9 % (FLUSH) 0.9 %
10 SYRINGE (ML) INJECTION
Status: DISCONTINUED | OUTPATIENT
Start: 2022-04-11 | End: 2022-04-11 | Stop reason: HOSPADM

## 2022-04-11 RX ORDER — HYDROCORTISONE 25 MG/G
CREAM TOPICAL 3 TIMES DAILY PRN
Status: DISCONTINUED | OUTPATIENT
Start: 2022-04-11 | End: 2022-04-11 | Stop reason: HOSPADM

## 2022-04-11 RX ORDER — ENOXAPARIN SODIUM 100 MG/ML
40 INJECTION SUBCUTANEOUS EVERY 24 HOURS
Status: DISCONTINUED | OUTPATIENT
Start: 2022-04-11 | End: 2022-04-11 | Stop reason: HOSPADM

## 2022-04-11 RX ORDER — ACETAMINOPHEN 325 MG/1
650 TABLET ORAL EVERY 6 HOURS PRN
Refills: 0
Start: 2022-04-11 | End: 2023-08-22

## 2022-04-11 RX ORDER — HYDROXYZINE PAMOATE 25 MG/1
25 CAPSULE ORAL EVERY 8 HOURS PRN
Status: DISCONTINUED | OUTPATIENT
Start: 2022-04-11 | End: 2022-04-11 | Stop reason: HOSPADM

## 2022-04-11 RX ORDER — DIPHENHYDRAMINE HYDROCHLORIDE 50 MG/ML
25 INJECTION INTRAMUSCULAR; INTRAVENOUS EVERY 4 HOURS PRN
Status: DISCONTINUED | OUTPATIENT
Start: 2022-04-11 | End: 2022-04-11 | Stop reason: HOSPADM

## 2022-04-11 RX ORDER — IBUPROFEN 600 MG/1
600 TABLET ORAL EVERY 6 HOURS
Status: DISCONTINUED | OUTPATIENT
Start: 2022-04-11 | End: 2022-04-11 | Stop reason: HOSPADM

## 2022-04-11 RX ORDER — DOCUSATE SODIUM 100 MG/1
200 CAPSULE, LIQUID FILLED ORAL 2 TIMES DAILY PRN
Status: DISCONTINUED | OUTPATIENT
Start: 2022-04-11 | End: 2022-04-11 | Stop reason: HOSPADM

## 2022-04-11 RX ORDER — PROCHLORPERAZINE EDISYLATE 5 MG/ML
5 INJECTION INTRAMUSCULAR; INTRAVENOUS EVERY 6 HOURS PRN
Status: DISCONTINUED | OUTPATIENT
Start: 2022-04-11 | End: 2022-04-11 | Stop reason: HOSPADM

## 2022-04-11 RX ORDER — OXYTOCIN/RINGER'S LACTATE 30/500 ML
95 PLASTIC BAG, INJECTION (ML) INTRAVENOUS ONCE
Status: DISCONTINUED | OUTPATIENT
Start: 2022-04-11 | End: 2022-04-11

## 2022-04-11 RX ORDER — ONDANSETRON 8 MG/1
8 TABLET, ORALLY DISINTEGRATING ORAL EVERY 8 HOURS PRN
Status: DISCONTINUED | OUTPATIENT
Start: 2022-04-11 | End: 2022-04-11 | Stop reason: HOSPADM

## 2022-04-11 RX ORDER — DIPHENHYDRAMINE HCL 25 MG
25 CAPSULE ORAL EVERY 4 HOURS PRN
Status: DISCONTINUED | OUTPATIENT
Start: 2022-04-11 | End: 2022-04-11 | Stop reason: HOSPADM

## 2022-04-11 RX ADMIN — ENOXAPARIN SODIUM 40 MG: 100 INJECTION SUBCUTANEOUS at 11:04

## 2022-04-11 RX ADMIN — IBUPROFEN 600 MG: 600 TABLET ORAL at 05:04

## 2022-04-11 RX ADMIN — IBUPROFEN 600 MG: 600 TABLET ORAL at 11:04

## 2022-04-11 RX ADMIN — DOCUSATE SODIUM 200 MG: 100 CAPSULE, LIQUID FILLED ORAL at 09:04

## 2022-04-11 RX ADMIN — SERTRALINE HYDROCHLORIDE 25 MG: 25 TABLET ORAL at 09:04

## 2022-04-11 NOTE — LACTATION NOTE
No breast pumps available at the moment. Pt encouraged to hand express and collect colostrum to bring to NICU.

## 2022-04-11 NOTE — PLAN OF CARE
VSS. Pain well controlled with ibuprofen. Pt ambulating independently and voiding without difficulty. SCDs in place. Fundus midline, firm, with moderate lochia. Patient safety maintained, side rails up, bed low and locked position. Significant other at bedside. Infant in NICU, parents visiting often. Will continue to round as needed.

## 2022-04-11 NOTE — PROGRESS NOTES
PostPartum Progress Note        Subjective:      Post-Partum Day #1 after vaginal delivery complicated by fetal cardiac defects.  .    Patient is without complaints. Lochia decreasing. Both breast and bottle feeding. Pain is well controlled. Patient is ambulating. Tolerating Full Regular diet.Overall mother is doing well. Baby boy is in the NICU for further evaluation..     Objective:      Temp:  [97.8 °F (36.6 °C)-98.4 °F (36.9 °C)] 97.9 °F (36.6 °C)  Pulse:  [] 77  Resp:  [16-18] 18  SpO2:  [96 %-100 %] 96 %  BP: ()/(58-88) 113/81    Intake/Output Summary (Last 24 hours) at 2022 1031  Last data filed at 2022 0330  Gross per 24 hour   Intake 1984.41 ml   Output 2095 ml   Net -110.59 ml     Body mass index is 25.59 kg/m².    General: no acute distress  Abdomen: soft, non-tender, non-distended; Fundus firm and below the umbilicus  breasts: nontender, nonengorged  Extremities: non-tender, symmetric, no edema    Group & Rh   Date Value Ref Range Status   04/10/2022 A POS  Final     Recent Results (from the past 336 hour(s))   CBC auto differential    Collection Time: 22  4:36 AM   Result Value Ref Range    WBC 12.97 (H) 3.90 - 12.70 K/uL    Hemoglobin 10.5 (L) 12.0 - 16.0 g/dL    Hematocrit 32.0 (L) 37.0 - 48.5 %    Platelets 183 150 - 450 K/uL   CBC auto differential    Collection Time: 04/10/22  3:00 PM   Result Value Ref Range    WBC 10.07 3.90 - 12.70 K/uL    Hemoglobin 11.4 (L) 12.0 - 16.0 g/dL    Hematocrit 34.1 (L) 37.0 - 48.5 %    Platelets 235 150 - 450 K/uL   CBC Auto Differential    Collection Time: 22 11:05 AM   Result Value Ref Range    WBC 10.29 3.90 - 12.70 K/uL    Hemoglobin 11.3 (L) 12.0 - 16.0 g/dL    Hematocrit 33.6 (L) 37.0 - 48.5 %    Platelets 232 150 - 450 K/uL          Assessment:     27 y.o.  S/P  Post-Partum Day #1  - Doing Well   Personal history of thrombosis      Plan:     1. Continue routine postpartum care  2. lovenox DVT prophylaxis restarted  after epidural  2. Plan for D/C PPD 2

## 2022-04-11 NOTE — L&D DELIVERY NOTE
[facility] - Labor & Delivery  Vaginal Delivery   Operative Note    SUMMARY   Resident to bedside for cervical exam 2/2 recurrent variable decelerations and increased pelvic pressure. Patient noted to be 10/100/+2 and transported to OR for delivery due to fetal cardiac anomaly.      cephalic after approximately 8 minutes of maternal pushing.  Under epidural anesthesia.  Infant delivered OA over intact perineum.  No nuchal noted at introitus.   Male infant also tolerated the delivery well and was vigorous and was taken by NICU to resuscitation 2/2 fetal cardiac anomaly.   Cord clamped and cut.  Cord blood obtained.   Placenta delivered spontaneously and IV pitocin given. No trailing membranes noted on uterine sweep. Placenta evaluated and noted to be fully intact.   Continued uterine bleeding noted and cytotec 800 IA given.   1st degree perineal laceration repaired with 3-0 vicryl in running fashion. Right-side labial laceration repaired with 3-0 vicryl in running fashion. Hemostasis noted.   Uterine tone noted. No cervical lacerations.  Patient tolerated delivery well.   cc  Staff present for entire procedure.  S/L/N counts correct x2.     Indications:  (spontaneous vaginal delivery)  Pregnancy complicated by:   Patient Active Problem List   Diagnosis    Vitamin D insufficiency    History of DVT of lower extremity    Fetal cardiac anomaly affecting pregnancy, antepartum    PROM (premature rupture of membranes)     (spontaneous vaginal delivery)     Admitting GA: 38w5d    Delivery Information for Natan Pierre    Birth information:  YOB: 2022   Time of birth: 8:28 PM   Sex: male   Head Delivery Date/Time: 4/10/2022  8:28 PM   Delivery type: Vaginal, Spontaneous   Gestational Age: 38w5d    Delivery Providers    Delivering clinician:            Measurements    Weight:   Length:          Apgars    Living status: Living  Apgars:  1 min.:  5 min.:  10 min.:  15 min.:  20 min.:     Skin color:         Heart rate:         Reflex irritability:         Muscle tone:         Respiratory effort:         Total:                Operative Delivery    Forceps attempted?: No  Vacuum extractor attempted?: No         Shoulder Dystocia    Shoulder dystocia present?: No           Presentation    Presentation: Vertex  Position: Middle Occiput Anterior           Interventions/Resuscitation    Method: NICU Attended       Cord    Vessels: 3 vessels  Complications: None  Delayed Cord Clamping?: Yes  Cord Clamped Date/Time: 4/10/2022  8:30 PM  Cord Blood Disposition: Sent with Baby  Gases Sent?: No  Stem Cell Collection (by MD): No       Placenta    Placenta delivery date/time: 4/10/2022 2031  Placenta removal: Spontaneous  Placenta appearance: Intact  Placenta disposition: discarded           Labor Events:       labor: No     Labor Onset Date/Time:         Dilation Complete Date/Time:         Start Pushing Date/Time:         Start Pushing Date/Time:       Rupture Date/Time: 04/10/22  1030         Rupture type:          Fluid Amount:       Fluid Color: Clear      Fluid Odor:       Membrane Status: SRM (Spontaneous Rupture)               steroids: None     Antibiotics given for GBS: No     Induction: none     Indications for induction:        Augmentation: oxytocin     Indications for augmentation: Fetal Heart Rate or Rhythm Abnormality     Labor complications: None     Additional complications:          Cervical ripening:                     Delivery:      Episiotomy: None     Indication for Episiotomy:       Perineal Lacerations: 1st Repaired:      Periurethral Laceration:   Repaired:     Labial Laceration:   Repaired:     Sulcus Laceration:   Repaired:     Vaginal Laceration:   Repaired:     Cervical Laceration:   Repaired:     Repair suture:       Repair # of packets:       Last Value - EBL - Nursing (mL):       Sum - EBL - Nursing (mL): 0     Last Value - EBL - Anesthesia (mL):      Calculated  QBL (mL):       Vaginal Sweep Performed: Yes     Surgicount Correct: Yes       Other providers:       Anesthesia    Method: Epidural          Details (if applicable):  Trial of Labor      Categorization:      Priority:     Indications for :     Incision Type:       Additional  information:  Forceps:    Vacuum:    Breech:    Observed anomalies    Other (Comments):

## 2022-04-11 NOTE — LACTATION NOTE
Mother was taught hand expression of breastmilk/colostrum. She was instructed to:   Sit upright and lean forward, if possible.   When feasible, apply warm, wet compress over breasts for a few minutes.    Perform gentle breast massage.   Form a C with her hand and place it about 1 inch back from the areola with the nipple centered between her index finger and her thumb.   Press, compress, relax:  Using her finger and thumb, apply pressure in an inward direction toward the breast without stretching the tissue, compress the breast tissue between her finger and thumb, then relax her finger and thumb. Repeat process for a few minutes.   Rotate placement of finger and thumb on the breasts to facilitate emptying.   Collect expressed breastmilk/colostrum with a spoon or cup and feed immediately to the baby, if able.   If unable to feed immediately, place breastmilk/colostrum directly into a sterile storage container for later use. Place the babys breast milk label (with the date and time of collection and the names of mother's medications) on the container. Reviewed proper handling and storage of expressed breastmilk.   Patient effectively return demonstrated and verbalized understanding.    Hand expressed bilateral breasts at 2040. Colostrum sent up to NICU

## 2022-04-11 NOTE — CARE UPDATE
Resident to bedside for FSE placement. SVE: 5/80/-2.     FHT: 115 bpm, mod variability, + accels, + variable decels; Cat 2 Tracing   TOCO: difficult to discern on TOCO     Will continue to closely monitor maternal/ fetal status   Continue pitocin augmentation per protocol   Recheck in 4 hours or PRN       Sweta Da Silva MD/MPH  OB/GYN PGY-1   [facility]

## 2022-04-11 NOTE — ANESTHESIA POSTPROCEDURE EVALUATION
Anesthesia Post Evaluation    Patient: Niesha Pierre    Procedure(s) Performed: * No procedures listed *    Final Anesthesia Type: epidural      Patient location during evaluation: floor  Patient participation: Yes- Able to Participate  Level of consciousness: awake and alert  Post-procedure vital signs: reviewed and stable  Pain management: adequate  Airway patency: patent    PONV status at discharge: No PONV  Anesthetic complications: no      Cardiovascular status: blood pressure returned to baseline and hemodynamically stable  Respiratory status: unassisted, spontaneous ventilation and room air  Hydration status: euvolemic  Follow-up not needed.          Vitals Value Taken Time   /81 04/11/22 0835   Temp 36.6 °C (97.9 °F) 04/11/22 0835   Pulse 77 04/11/22 0835   Resp 18 04/11/22 0835   SpO2 96 % 04/11/22 0835         No case tracking events are documented in the log.      Pain/Joel Score: Pain Rating Prior to Med Admin: 2 (4/11/2022 11:05 AM)

## 2022-04-11 NOTE — CARE UPDATE
Resident to bedside for recurrent variable decelerations and increased pelvic pressure. Patient 10/100/+1. Notified primary OB. Will proceed to OR for delivery.     Sweta Da Silva MD/MPH  OB/GYN PGY-1   [facility]

## 2022-04-11 NOTE — LACTATION NOTE
LC visit to room to review pumping for an NICU baby. Gave mother NICU Mother's Breastfeeding Guide. Showed mother how to work pump, how to keep track of pumpings, how to label nicu breastmilk, how to clean pump parts and bring milk to NICU even if it is only a drop of milk. NICU uses mother's milk for mouth care so even small amounts are ok to bring to NICU. Mother aware to pump 8 or more times a day. Showed mother how to use Symphony pump on initiate setting. Mom desires early discharge since baby is transferred to [facility]. Nicu breastfeeding discharge education  Done via breastfeeding guide. Pt will be using her Spectra pump at home. Pt educated on transportation of her breastmilk from home to the PICU. Pt given breastfeeding resources information to contact for breastfeeding questions or concerns.

## 2022-04-11 NOTE — DISCHARGE INSTRUCTIONS
PUMPING FOR A NICU BABY      Preparation and Hygiene:    Shower daily.  Wear a clean bra each day and wash daily in warm soapy water.  Change wet or moist breast pads frequently.  Moist pads can promote growth of germs.  Actively wash your hands, paying close attention to the area around and under your fingernails, thoroughly with soap and water for 15 seconds before pumping or handling your milk.  Re-wash your hands if you touch anything (scratching your nose, answering the phone, etc) while pumping or handling your milk.   Before pumping your breasts, assemble the pump collection kit and have ready the sterile container and labels.  Place these items on a clean surface next to the breastpump.  Each time after you have finished pumping, take apart all of the parts of the breastpump collection kit and place them in a separate cleaning container (do not place them in the sink).  Be sure to remove the yellow valve from the breastshield and separate the white membrane from the yellow valve.  Rinse all of these parts with cool water.  Then use a new sponge and/or bottle brush and dishwashing detergent to clean the parts.  Rinse off the soapy water with cool water and air dry on a clean towel covered with a clean cloth.  All parts may also be washed after each use in the top rack of a .  Once each day, sterilize all of the parts of the breastpump collection kit.  This can be done by boiling the kit parts for 10 minutes or by using a Quick Clean Micro-Steam Bag made by [employer]  If condensation appears in the tubing, continue to run the pump with the tubing attached for 1-2 minutes or until the tubing is dry.   Notify your babys nurse or doctor if you become ill or need to take any medication, even over-the-counter medicines.        Collection and Storage of Expressed Breastmilk:         1. Pump your breasts at least 8-10 times every 24 hours.  Double pump (both breasts at  the same time) for at least  15-20 minutes using the most suction that is comfortable.    2. Write the date and time of pumping and the name of any medications you are takingon the babys pre-printed hospital identification label.   3. Place your babys pre-printed hospital identification label on each container of breastmilk.  Additional pre-printed labels can be obtained from your babys nurse.  If your expressed breastmilk is not correctly labeled, the nurse cannot feed the milk to the baby.       4. Place a brightly colored sticker on the top of each container of milk pumped during the first 30 days.  This identifies the milk as special and having higher levels of nutrients and anti-infective properties that are so important for your baby.  Additional stickers can be obtained from the lactation consultants or your babys nurse.  5.   Do not touch the inside of the storage containers or lids.  6.      Pour the amount of expressed milk needed for 1 of your babys feedings into each   storage container. Use a new container(s) for each pumping.  Additional storage   containers can be obtained from your babys nurse.        7.       Tightly screw the lid onto the container and place immediately into the       refrigerator fordaily transportation to the hospital.   Do not freeze your milk      unless asked to do so by your babys nurse.  However, if you are not able to      visit your baby each day, place the expressed breastmilk in the freezer.  8.   Expressed breastmilk should be refrigerated or frozen within 4 hours of      pumping.  9.      Do not store expressed breastmilk on the door of your refrigerator or freezer where the temperature is warmer.         Transportation of Expressed Breastmilk:    Refrigerated breastmilk or frozen milk should be packed tightly together in a cooler with frozen, blue gel-packs to keep the milk frozen.  DO NOT USE ICE CUBES (WET ICE) TO TRANSPORT FROZEN MILK.   A clean towel can be used to fill any extra  space between containers of frozen milk.  2.    Bring your expressed milk from home each time you visit the baby.        [facility] LC- [phone]  [facility]- [phone]

## 2022-04-11 NOTE — DISCHARGE SUMMARY
"[facility] - [facility] ([place])  Obstetrics  Discharge Summary      Patient Name: Niesha Pierre  MRN: [id number]  Admission Date: 4/10/2022  Hospital Length of Stay: 1 days  Discharge Date and Time:  2022 2:43 PM  Attending Physician: Tahmina Alarcon MD   Discharging Provider: Brandy Trinh MD   Primary Care Provider: Jaquelin Morales MD    H  Hospital Course:   26 yo  presented at 38 6/7 wga with SROM.  Her prenatal care has been with Dr. Clark.  It has been complicated by maternal hx of DVT,requiring prophylactic anticoagulation with Lovenox.  Also, the baby has been diagnosed with "shone complex" and will need cardiac evaluation/care.    She was inducted in labor and delivered a viable male infant via .  Apgars: 8,9.  Baby was transferred to [facility] on day 1.  Niesha was physically doing well on PPD 1, and desired discharge to be able to be with infant at other facility.She is having some anxiety; and is on sertraline and will continue and has been given hydroxazine PRN.       Consults (From admission, onward)        Status Ordering Provider     Inpatient consult to Lactation  Once        Provider:  (Not yet assigned)    Ordered BRANDY TRINH          Final Active Diagnoses:    Diagnosis Date Noted POA    PRINCIPAL PROBLEM:   (spontaneous vaginal delivery) [O80] 04/10/2022 Not Applicable    PROM (premature rupture of membranes) [O42.90] 04/10/2022 Yes    Fetal cardiac anomaly affecting pregnancy, antepartum [O35.8XX0] 2022 Yes    History of DVT of lower extremity [Z86.718] 2019 Not Applicable      Problems Resolved During this Admission:        Significant Diagnostic Studies: Labs:   CBC   Recent Labs   Lab 04/10/22  1500 22  0436   WBC 10.07 12.97*   HGB 11.4* 10.5*   HCT 34.1* 32.0*    183         Feeding Method: both breast and bottle    Immunizations     Date Immunization Status Dose Route/Site Given by    22 " 0132 MMR Incomplete 0.5 mL Subcutaneous/     04/11/22 0132 Tdap Incomplete 0.5 mL Intramuscular/           Delivery:    Episiotomy: None   Lacerations: 1st   Repair suture:     Repair # of packets: 2   Blood loss (ml):       Birth information:  YOB: 2022   Time of birth: 8:28 PM   Sex: male   Delivery type: Vaginal, Spontaneous   Gestational Age: 38w5d    Delivery Clinician:      Other providers:       Additional  information:  Forceps:    Vacuum:    Breech:    Observed anomalies      Living?:           APGARS  One minute Five minutes Ten minutes   Skin color:         Heart rate:         Grimace:         Muscle tone:         Breathing:         Totals: 8  9        Placenta: Delivered:       appearance    Pending Diagnostic Studies:     None          Discharged Condition: good    Disposition: Home or Self Care    Follow Up:   Follow-up Information     Tahmina Alarcon MD Follow up in 2 week(s).    Specialty: Obstetrics and Gynecology  Why: postpartum check up  Contact information:  [address]  [address] [place] [id number] [phone]                       Patient Instructions:      Diet Adult Regular     Pelvic Rest     Activity as tolerated     Medications:  Current Discharge Medication List      START taking these medications    Details   acetaminophen (TYLENOL) 325 MG tablet Take 2 tablets (650 mg total) by mouth every 6 (six) hours as needed for Pain.  Refills: 0    Comments: OTC      benzocaine-lanolin (DERMOPLAST) 20-0.5 % Aero Apply topically continuous prn (pain).    Comments: OTC      HYDROcodone-acetaminophen (NORCO) 5-325 mg per tablet Take 1 tablet by mouth every 4 (four) hours as needed.  Qty: 7 tablet, Refills: 0    Comments: Quantity prescribed more than 7 day supply? No      hydrOXYzine pamoate (VISTARIL) 25 MG Cap Take 1 capsule (25 mg total) by mouth every 8 (eight) hours as needed (anxiety).  Qty: 30 capsule, Refills: 0      ibuprofen (ADVIL,MOTRIN) 600 MG tablet Take  1 tablet (600 mg total) by mouth every 8 (eight) hours as needed for Pain.  Qty: 30 tablet, Refills: 0         CONTINUE these medications which have NOT CHANGED    Details   enoxaparin (LOVENOX) 40 mg/0.4 mL Syrg INJECT 0.4MLS INTO THE SKIN EVERY DAY  Qty: 12 mL, Refills: 1    Associated Diagnoses: Personal history of other venous thrombosis and embolism; Encounter for supervision of normal pregnancy, unspecified, unspecified trimester      ondansetron (ZOFRAN-ODT) 4 MG TbDL Take 1 tablet (4 mg total) by mouth every 8 (eight) hours as needed (nausea).  Qty: 30 tablet, Refills: 2    Associated Diagnoses: Nausea      prenatal vit calc,iron,folic (PRENATAL VITAMIN ORAL) Take 1 tablet by mouth.      sertraline (ZOLOFT) 25 MG tablet Take 1 tablet (25 mg total) by mouth once daily.  Qty: 30 tablet, Refills: 2             Brandy Trinh MD  Obstetrics  [facility] - [facility] ([place])

## 2022-04-11 NOTE — LACTATION NOTE
This note was copied from a baby's chart.  LC spoke with parents at infant's bedside. Mother reports that RN assisted with hand expression on MBU but she has not initiated pumping. LC offered to assist with initiation of pumping at infant's bedside. Discussed the importance of initiation as soon as possible and encouraged frequent pumping every 2-3 hours with only one 5-hr stretch without pumping for rest. Mother voiced understanding. Mother declined initiation until infant transferred to PICU (anytime now). Mother voiced plan to initiate pumping when she returns to MBU  After infant leaves [facility]. LC again offered assistance if needed. Shira Joseph, BSN, RNC, CLC, IBCLC

## 2022-04-11 NOTE — PLAN OF CARE
Infant in NICU was transferred to [facility] due to cardiac care. Pt requests to be discharged home so she can visit baby at [facility]. OB put discharge orders in.  Pt. Discharged via wheelchair by . Pt. Verbalized understanding about making her 1 week appointment for mood check and 6 week postpartum appointment.

## 2022-04-12 NOTE — PHYSICIAN QUERY
PT Name: Niesha Pierre  MR #: [id number]     DOCUMENTATION CLARIFICATION     CDS/: BRIAN Hernandez,RNC-MNN     Contact information:[email or web address]  This form is a permanent document in the medical record.    Query Date: 2022  By submitting this query, we are merely seeking further clarification of documentation. Please utilize your independent clinical judgment when addressing the question(s) below.      Indicators Supporting Clinical Findings Location in Medical Record   X Documentation of uterine atony with bleeding, post-partum bleeding, or hemorrhage Continued uterine bleeding noted  L&D Delivery note     Documentation of retained placenta     X Delivery type with EBL or QBL  cephalic after approximately 8 minutes of maternal pushing     cc L&D Delivery note    X H/H Hgb=11.4-->10.5  Hct=34.1-->32.0 LAB 4/10-    Vital signs     X Medications, Treatment Placenta delivered spontaneously and IV pitocin given. No trailing membranes noted on uterine sweep. Placenta evaluated and noted to be fully intact.   Continued uterine bleeding noted and cytotec 800 VA given.  L&D Delivery note     Blood transfusion      Other          Provider, please specify the diagnosis or diagnoses associated with the above clinical findings:      [   ] Immediate post-partum hemorrhage   [ x  ] Postpartum bleeding, clinically insignificant   [   ] Other hematological diagnosis (please specify): _________________   [  ] Clinically undetermined        Please document in your progress notes daily for the duration of treatment, until resolved, and include in your discharge summary.  (Form 82056)

## 2022-04-14 ENCOUNTER — PATIENT MESSAGE (OUTPATIENT)
Dept: LACTATION | Facility: CLINIC | Age: 27
End: 2022-04-14
Payer: COMMERCIAL

## 2022-04-17 ENCOUNTER — PATIENT MESSAGE (OUTPATIENT)
Dept: OBSTETRICS AND GYNECOLOGY | Facility: CLINIC | Age: 27
End: 2022-04-17
Payer: COMMERCIAL

## 2022-04-25 ENCOUNTER — TELEPHONE (OUTPATIENT)
Dept: OBSTETRICS AND GYNECOLOGY | Facility: CLINIC | Age: 27
End: 2022-04-25
Payer: COMMERCIAL

## 2022-04-25 RX ORDER — CEPHALEXIN 500 MG/1
500 CAPSULE ORAL 4 TIMES DAILY
Qty: 40 CAPSULE | Refills: 0 | Status: CANCELLED | OUTPATIENT
Start: 2022-04-25 | End: 2022-05-05

## 2022-04-25 RX ORDER — DICLOXACILLIN SODIUM 500 MG/1
500 CAPSULE ORAL EVERY 6 HOURS
Qty: 28 CAPSULE | Refills: 0 | Status: SHIPPED | OUTPATIENT
Start: 2022-04-25 | End: 2022-05-02

## 2022-04-25 NOTE — TELEPHONE ENCOUNTER
"2 nights ago slept 6 hours straight instead of waking up every 2-3 hours to pump.  Since then she reports s/s of mastitis that are worsening.  C/o left breast pain, erythema, feels run down, also having hot and cold sweats.  Hasn't taken temperature.  Has been using hot compresses and massage.  Also suggested Haakaa breast pump fill 1/3 of the way with warm epsom salt and using electric toothbrush on lump.   Exclusively pumping.      Keflex pended, unless you would rather Dicloxacillin?      Just SVEN.  Baby had open heart surgery 1 week ago.  He is doing well and making progress every day.  She wrote on 4/17 feeling anxious that the Zoloft wasn't working.  States she never messaged back because she felt "frazzled".  She is feeling much better now, less anxious.  Feels like the Zoloft is helping.   "

## 2022-05-23 ENCOUNTER — POSTPARTUM VISIT (OUTPATIENT)
Dept: OBSTETRICS AND GYNECOLOGY | Facility: CLINIC | Age: 27
End: 2022-05-23
Attending: STUDENT IN AN ORGANIZED HEALTH CARE EDUCATION/TRAINING PROGRAM
Payer: COMMERCIAL

## 2022-05-23 VITALS
DIASTOLIC BLOOD PRESSURE: 66 MMHG | SYSTOLIC BLOOD PRESSURE: 116 MMHG | BODY MASS INDEX: 22.41 KG/M2 | HEIGHT: 65 IN | WEIGHT: 134.5 LBS

## 2022-05-23 PROCEDURE — 0503F POSTPARTUM CARE VISIT: CPT | Mod: S$GLB,,, | Performed by: STUDENT IN AN ORGANIZED HEALTH CARE EDUCATION/TRAINING PROGRAM

## 2022-05-23 PROCEDURE — 99999 PR PBB SHADOW E&M-EST. PATIENT-LVL III: CPT | Mod: PBBFAC,,, | Performed by: STUDENT IN AN ORGANIZED HEALTH CARE EDUCATION/TRAINING PROGRAM

## 2022-05-23 PROCEDURE — 0503F PR POSTPARTUM CARE VISIT: ICD-10-PCS | Mod: S$GLB,,, | Performed by: STUDENT IN AN ORGANIZED HEALTH CARE EDUCATION/TRAINING PROGRAM

## 2022-05-23 PROCEDURE — 99999 PR PBB SHADOW E&M-EST. PATIENT-LVL III: ICD-10-PCS | Mod: PBBFAC,,, | Performed by: STUDENT IN AN ORGANIZED HEALTH CARE EDUCATION/TRAINING PROGRAM

## 2022-05-23 RX ORDER — LACTIC ACID, L-, CITRIC ACID MONOHYDRATE, AND POTASSIUM BITARTRATE 90; 50; 20 MG/5G; MG/5G; MG/5G
5 GEL VAGINAL
Qty: 60 G | Refills: 3 | Status: SHIPPED | OUTPATIENT
Start: 2022-05-23 | End: 2023-08-22

## 2022-05-23 NOTE — PROGRESS NOTES
Subjective:      Niesha Pierre is a 27 y.o.  who presents for a postpartum visit.  She is status post  uncomplicated vaginal delivery 6 weeks ago.  Her hospitalization was not complicated.  H/o PE on lovenox and has continued this postpartum.  Baby with heart defect - has had surgery and was discharged to home last week.  Is doing well.  She is breastfeeding.  She desires phexxi for contraception.  She reports mood is stable.      Past Medical History:   Diagnosis Date    Depression     History of DVT of lower extremity 3/7/2019       Current Outpatient Medications:     hydrOXYzine pamoate (VISTARIL) 25 MG Cap, Take 1 capsule (25 mg total) by mouth every 8 (eight) hours as needed (anxiety)., Disp: 30 capsule, Rfl: 0    prenatal vit calc,iron,folic (PRENATAL VITAMIN ORAL), Take 1 tablet by mouth., Disp: , Rfl:     sertraline (ZOLOFT) 25 MG tablet, Take 1 tablet (25 mg total) by mouth once daily., Disp: 30 tablet, Rfl: 2    acetaminophen (TYLENOL) 325 MG tablet, Take 2 tablets (650 mg total) by mouth every 6 (six) hours as needed for Pain. (Patient not taking: Reported on 2022), Disp: , Rfl: 0    benzocaine-lanolin (DERMOPLAST) 20-0.5 % Aero, Apply topically continuous prn (pain). (Patient not taking: Reported on 2022), Disp: , Rfl:     enoxaparin (LOVENOX) 40 mg/0.4 mL Syrg, INJECT 0.4MLS INTO THE SKIN EVERY DAY (Patient not taking: Reported on 2022), Disp: 12 mL, Rfl: 1    HYDROcodone-acetaminophen (NORCO) 5-325 mg per tablet, Take 1 tablet by mouth every 4 (four) hours as needed. (Patient not taking: Reported on 2022), Disp: 7 tablet, Rfl: 0    ibuprofen (ADVIL,MOTRIN) 600 MG tablet, Take 1 tablet (600 mg total) by mouth every 8 (eight) hours as needed for Pain. (Patient not taking: Reported on 2022), Disp: 30 tablet, Rfl: 0    lactic acid-citric-potassium (PHEXXI) 1.8-1-0.4 % Gel, Place 5 g vaginally as needed (intercourse)., Disp: 60 g, Rfl: 3     ondansetron (ZOFRAN-ODT) 4 MG TbDL, Take 1 tablet (4 mg total) by mouth every 8 (eight) hours as needed (nausea). (Patient not taking: Reported on 5/23/2022), Disp: 30 tablet, Rfl: 2      Objective:     Vitals:    05/23/22 1354   BP: 116/66       APPEARANCE: Well nourished, well developed, in no acute distress.  PSYCH: Appropriate mood and affect.  NECK:  Supple, no thyromegaly.  ABDOMEN:  Soft, nontender.  GENITOURINARY:  External genitalia normal in appearance, normal perineal body, normal urethral meatus.  Vagina with scant discharge, no blood.  No lesions.  Cervix without lesion, C/T/H.  Uterus mobile, nontender, normal in size.  Adnexa without masses or TTP.    EXTREMITIES: No edema.    Assessment:     Postpartum care and examination    Other orders  -     lactic acid-citric-potassium (PHEXXI) 1.8-1-0.4 % Gel; Place 5 g vaginally as needed (intercourse).  Dispense: 60 g; Refill: 3        Plan:     1. Postpartum course reviewed and discussed with patient.  Desires phexxi - R/B/A reviewed and all questions answered.  Return to clinic in 1 year for annual exam or sooner if needed.

## 2022-05-27 ENCOUNTER — PATIENT MESSAGE (OUTPATIENT)
Dept: OBSTETRICS AND GYNECOLOGY | Facility: CLINIC | Age: 27
End: 2022-05-27
Payer: COMMERCIAL

## 2022-05-27 NOTE — TELEPHONE ENCOUNTER
Spoke with patient and all questions answered.  R/B/A reviewed.  Will hold off on botox while breastfeeding.

## 2022-05-30 ENCOUNTER — PATIENT MESSAGE (OUTPATIENT)
Dept: ADMINISTRATIVE | Facility: HOSPITAL | Age: 27
End: 2022-05-30
Payer: COMMERCIAL

## 2022-08-24 ENCOUNTER — PATIENT MESSAGE (OUTPATIENT)
Dept: OBSTETRICS AND GYNECOLOGY | Facility: CLINIC | Age: 27
End: 2022-08-24
Payer: COMMERCIAL

## 2022-09-28 NOTE — HOSPITAL COURSE
"26 yo  presented at 38 6/7 wga with SROM.  Her prenatal care has been with Dr. Clark.  It has been complicated by maternal hx of DVT,requiring prophylactic anticoagulation with Lovenox.  Also, the baby has been diagnosed with "shone complex" and will need cardiac evaluation/care.    She was inducted in labor and delivered a viable male infant via .  Apgars: 8,9.  Baby was transferred to [facility] on day 1.  Niesha was physically doing well on PPD 1, and desired discharge to be able to be with infant at other facility.She is having some anxiety; and is on sertraline and will continue and has been given hydroxazine PRN.  "
Home

## 2022-11-06 ENCOUNTER — PATIENT MESSAGE (OUTPATIENT)
Dept: OBSTETRICS AND GYNECOLOGY | Facility: CLINIC | Age: 27
End: 2022-11-06
Payer: COMMERCIAL

## 2022-11-06 DIAGNOSIS — F32.A DEPRESSION, UNSPECIFIED DEPRESSION TYPE: Primary | ICD-10-CM

## 2022-11-06 DIAGNOSIS — F41.9 ANXIETY: ICD-10-CM

## 2022-11-09 ENCOUNTER — OFFICE VISIT (OUTPATIENT)
Dept: OBSTETRICS AND GYNECOLOGY | Facility: CLINIC | Age: 27
End: 2022-11-09
Attending: OBSTETRICS & GYNECOLOGY
Payer: COMMERCIAL

## 2022-11-09 DIAGNOSIS — F41.9 ANXIETY: Primary | ICD-10-CM

## 2022-11-09 PROCEDURE — 99213 OFFICE O/P EST LOW 20 MIN: CPT | Mod: 95,,, | Performed by: OBSTETRICS & GYNECOLOGY

## 2022-11-09 PROCEDURE — 99213 PR OFFICE/OUTPT VISIT, EST, LEVL III, 20-29 MIN: ICD-10-PCS | Mod: 95,,, | Performed by: OBSTETRICS & GYNECOLOGY

## 2022-11-09 RX ORDER — FLUOXETINE 10 MG/1
10 CAPSULE ORAL DAILY
Qty: 30 CAPSULE | Refills: 0 | Status: SHIPPED | OUTPATIENT
Start: 2022-11-09 | End: 2023-03-07

## 2022-11-09 NOTE — PROGRESS NOTES
The patient location is: car  The chief complaint leading to consultation is: anxiety    Visit type: audiovisual    Face to Face time with patient: 12  20 minutes of total time spent on the encounter, which includes face to face time and non-face to face time preparing to see the patient (eg, review of tests), Obtaining and/or reviewing separately obtained history, Documenting clinical information in the electronic or other health record, Independently interpreting results (not separately reported) and communicating results to the patient/family/caregiver, or Care coordination (not separately reported).         Each patient to whom he or she provides medical services by telemedicine is:  (1) informed of the relationship between the physician and patient and the respective role of any other health care provider with respect to management of the patient; and (2) notified that he or she may decline to receive medical services by telemedicine and may withdraw from such care at any time.    Notes:    Subjective:       Patient ID: Niesha Pierre is a 27 y.o. female.    Chief Complaint:  Anxiety      History of Present Illness  - patient presents for virtual visit to discuss anxiety. Started Zoloft after her first child for postpartum depression. Noticed some weight gain. Restarted after second baby who needed open heart surgery. Feels like is gaining weight again. Would like to try something different. Denies depression. No si/hi.    Past Medical History:   Diagnosis Date    Depression     History of DVT of lower extremity 3/7/2019       History reviewed. No pertinent surgical history.     Family History   Problem Relation Age of Onset    Hypertension Mother     Hyperlipidemia Mother     Hypertension Father     Hyperlipidemia Father     Hypertension Maternal Uncle     Hypertension Paternal Uncle     Stroke Maternal Grandfather     Diabetes Paternal Grandmother     Cancer Paternal Grandfather 80        Pancreatic         Social History     Socioeconomic History    Marital status:    Tobacco Use    Smoking status: Former     Types: Cigarettes     Quit date: 2013     Years since quittin.5    Smokeless tobacco: Never   Substance and Sexual Activity    Alcohol use: Not Currently     Alcohol/week: 18.0 standard drinks     Types: 18 Cans of beer per week    Drug use: No    Sexual activity: Yes     Partners: Male     Birth control/protection: None           Objective:   There were no vitals filed for this visit.    Physical Exam:   Constitutional: She appears well-developed and well-nourished. She is cooperative. No distress.                           Neurological: She is alert.        Assessment/ Plan:     Orders Placed This Encounter    FLUoxetine 10 MG capsule       Niesha was seen today for anxiety.    Diagnoses and all orders for this visit:    Anxiety    Other orders  -     FLUoxetine 10 MG capsule; Take 1 capsule (10 mg total) by mouth once daily.    - discussed risks/benefits of fluoxetine for anxiety. Longer-acting med. Easier to start and discontinue. Shown to be more weight neutral.  - patient would like to try it. She'll call or message before her 30 day Rx is finished in order to see if we need to change, continue, or increase dose.  - verbalized understanding and agrees with plan.    Follow up if symptoms worsen or fail to improve.    As of 2021, the Cures Act has been passed nationally. This new law requires that all doctors progress notes, lab results, pathology reports and radiology reports be released IMMEDIATELY to the patient in the patient portal. That means that the results are released to you at the EXACT same time they are released to me. Therefore, with all of the patients that I have I am not able to reply to each patient exactly when the results come in. So there will be a delay from when you see the results to when I see them and have time to come up with a response to send  you. Also I only see these results when I am on the computer at work. So if the results come in over the weekend or after 5 pm of a work day, I will not see them until the next business day. As you can tell, this is a challenge as a physician to give every patient the quick response they hope for and deserve. So please be patient!   Thanks for your understanding and patience.

## 2022-11-17 RX ORDER — BUPROPION HYDROCHLORIDE 150 MG/1
150 TABLET ORAL DAILY
Qty: 30 TABLET | Refills: 3 | Status: SHIPPED | OUTPATIENT
Start: 2022-11-17 | End: 2023-08-22

## 2022-11-17 NOTE — TELEPHONE ENCOUNTER
Spoke with patient and all questions answered.  Did not try prozac.  Is not taking anything right now.  Would like to try wellbutrin.  R/B/A reviewed and possible side effects.  Will also set up with psychiatry if no improvement.  All questions answered.  Will send over.  RTC in 4 weeks for follow up or sooner if needed.

## 2022-12-01 ENCOUNTER — PATIENT MESSAGE (OUTPATIENT)
Dept: OBSTETRICS AND GYNECOLOGY | Facility: CLINIC | Age: 27
End: 2022-12-01
Payer: COMMERCIAL

## 2022-12-02 RX ORDER — FLUCONAZOLE 150 MG/1
150 TABLET ORAL DAILY
Qty: 2 TABLET | Refills: 0 | Status: SHIPPED | OUTPATIENT
Start: 2022-12-02 | End: 2022-12-03

## 2023-02-01 ENCOUNTER — PATIENT MESSAGE (OUTPATIENT)
Dept: FAMILY MEDICINE | Facility: CLINIC | Age: 28
End: 2023-02-01
Payer: COMMERCIAL

## 2023-03-06 ENCOUNTER — PATIENT MESSAGE (OUTPATIENT)
Dept: OBSTETRICS AND GYNECOLOGY | Facility: CLINIC | Age: 28
End: 2023-03-06
Payer: COMMERCIAL

## 2023-03-07 RX ORDER — SERTRALINE HYDROCHLORIDE 25 MG/1
25 TABLET, FILM COATED ORAL DAILY
Qty: 90 TABLET | Refills: 1 | Status: SHIPPED | OUTPATIENT
Start: 2023-03-07 | End: 2023-09-19 | Stop reason: SDUPTHER

## 2023-03-07 NOTE — TELEPHONE ENCOUNTER
Spoke with patient and all questions answered.  Needs psych follow up.  Wants to restart zoloft.  R/b/a reviewed and all questions answered.  Side effects reviewed as well.  Risks with other meds discussed and she will follow up.  Will monitor closely.  Also needs psych which she will schedule.

## 2023-03-07 NOTE — TELEPHONE ENCOUNTER
Hi Dr Alarcon,  Im reaching out in regards to the issues Terrance had over the last few months. The last time we spoke we talked about getting on Wellbutrin and then I would follow up with a psychiatrist. I ended up not starting that medicine because I was feeling good. My appt with the psychiatrist cwas last month but I had to cancel due to not having a sitter although I really need to go. Ramirez is almost a year (11 months this Friday) and I feel like my PP desperation and anxiety has kicked back into full gear in the last two months. Im not sure whats going on but Im not feeling great mentally. Im not wanting to harm anyone or myself Im just scared to death of anything happening and feeling physically ill with worry.      Zoloft pended

## 2023-06-14 ENCOUNTER — PATIENT MESSAGE (OUTPATIENT)
Dept: OBSTETRICS AND GYNECOLOGY | Facility: CLINIC | Age: 28
End: 2023-06-14
Payer: COMMERCIAL

## 2023-07-19 DIAGNOSIS — N76.0 ACUTE VAGINITIS: Primary | ICD-10-CM

## 2023-07-20 RX ORDER — TINIDAZOLE 500 MG/1
2 TABLET ORAL DAILY
Qty: 8 TABLET | Refills: 0 | Status: SHIPPED | OUTPATIENT
Start: 2023-07-20 | End: 2023-07-22

## 2023-08-22 PROBLEM — F41.0 ANXIETY DISORDER DUE TO GENERAL MEDICAL CONDITION WITH PANIC ATTACK: Status: ACTIVE | Noted: 2023-08-22

## 2023-08-22 PROBLEM — F06.4 ANXIETY DISORDER DUE TO GENERAL MEDICAL CONDITION WITH PANIC ATTACK: Status: ACTIVE | Noted: 2023-08-22

## 2023-09-19 DIAGNOSIS — F41.0 ANXIETY DISORDER DUE TO GENERAL MEDICAL CONDITION WITH PANIC ATTACK: Primary | ICD-10-CM

## 2023-09-19 DIAGNOSIS — F06.4 ANXIETY DISORDER DUE TO GENERAL MEDICAL CONDITION WITH PANIC ATTACK: Primary | ICD-10-CM

## 2023-09-19 RX ORDER — SERTRALINE HYDROCHLORIDE 25 MG/1
25 TABLET, FILM COATED ORAL DAILY
Qty: 90 TABLET | Refills: 1 | Status: SHIPPED | OUTPATIENT
Start: 2023-09-19 | End: 2024-09-18

## 2024-05-09 DIAGNOSIS — F41.0 ANXIETY DISORDER DUE TO GENERAL MEDICAL CONDITION WITH PANIC ATTACK: ICD-10-CM

## 2024-05-09 DIAGNOSIS — F06.4 ANXIETY DISORDER DUE TO GENERAL MEDICAL CONDITION WITH PANIC ATTACK: ICD-10-CM

## 2024-05-10 DIAGNOSIS — F06.4 ANXIETY DISORDER DUE TO GENERAL MEDICAL CONDITION WITH PANIC ATTACK: ICD-10-CM

## 2024-05-10 DIAGNOSIS — F41.0 ANXIETY DISORDER DUE TO GENERAL MEDICAL CONDITION WITH PANIC ATTACK: ICD-10-CM

## 2024-05-10 RX ORDER — SERTRALINE HYDROCHLORIDE 25 MG/1
25 TABLET, FILM COATED ORAL DAILY
Qty: 90 TABLET | Refills: 0 | Status: SHIPPED | OUTPATIENT
Start: 2024-05-10 | End: 2024-05-10 | Stop reason: SDUPTHER

## 2024-05-10 RX ORDER — SERTRALINE HYDROCHLORIDE 25 MG/1
25 TABLET, FILM COATED ORAL DAILY
Qty: 90 TABLET | Refills: 0 | Status: SHIPPED | OUTPATIENT
Start: 2024-05-10 | End: 2025-05-10

## 2024-05-10 NOTE — TELEPHONE ENCOUNTER
Care Due:                  Date            Visit Type   Department     Provider  --------------------------------------------------------------------------------    Last Visit: None Found      None         None Found  Next Visit: None Scheduled  None         None Found                                                            Last  Test          Frequency    Reason                     Performed    Due Date  --------------------------------------------------------------------------------    Office Visit  15 months..  sertraline...............  Not Found    Overdue    [employer] Embedded Care Due Messages. Reference number: [id number].   5/09/2024 8:45:14 PM CDT

## 2024-05-10 NOTE — TELEPHONE ENCOUNTER
No care due was identified.  [facility] Embedded Care Due Messages. Reference number: [id number].   5/10/2024 3:07:58 PM CDT

## 2024-07-19 ENCOUNTER — PATIENT MESSAGE (OUTPATIENT)
Dept: OBSTETRICS AND GYNECOLOGY | Facility: CLINIC | Age: 29
End: 2024-07-19
Payer: COMMERCIAL

## 2024-07-19 DIAGNOSIS — N89.8 VAGINAL IRRITATION: Primary | ICD-10-CM

## 2024-07-19 RX ORDER — FLUCONAZOLE 150 MG/1
150 TABLET ORAL
Qty: 2 TABLET | Refills: 0 | Status: SHIPPED | OUTPATIENT
Start: 2024-07-19

## 2024-08-06 ENCOUNTER — OFFICE VISIT (OUTPATIENT)
Dept: OBSTETRICS AND GYNECOLOGY | Facility: CLINIC | Age: 29
End: 2024-08-06
Attending: OBSTETRICS & GYNECOLOGY
Payer: COMMERCIAL

## 2024-08-06 VITALS
SYSTOLIC BLOOD PRESSURE: 120 MMHG | DIASTOLIC BLOOD PRESSURE: 78 MMHG | WEIGHT: 141.44 LBS | HEIGHT: 65 IN | BODY MASS INDEX: 23.57 KG/M2

## 2024-08-06 DIAGNOSIS — F41.0 ANXIETY DISORDER DUE TO GENERAL MEDICAL CONDITION WITH PANIC ATTACK: ICD-10-CM

## 2024-08-06 DIAGNOSIS — Z01.419 ENCOUNTER FOR GYNECOLOGICAL EXAMINATION: Primary | ICD-10-CM

## 2024-08-06 DIAGNOSIS — Z12.4 PAP SMEAR FOR CERVICAL CANCER SCREENING: ICD-10-CM

## 2024-08-06 DIAGNOSIS — F06.4 ANXIETY DISORDER DUE TO GENERAL MEDICAL CONDITION WITH PANIC ATTACK: ICD-10-CM

## 2024-08-06 PROCEDURE — 99999 PR PBB SHADOW E&M-EST. PATIENT-LVL III: CPT | Mod: PBBFAC,,, | Performed by: OBSTETRICS & GYNECOLOGY

## 2024-08-06 PROCEDURE — 99395 PREV VISIT EST AGE 18-39: CPT | Mod: S$GLB,,, | Performed by: OBSTETRICS & GYNECOLOGY

## 2024-08-06 PROCEDURE — 88175 CYTOPATH C/V AUTO FLUID REDO: CPT | Performed by: OBSTETRICS & GYNECOLOGY

## 2024-08-06 PROCEDURE — 99459 PELVIC EXAMINATION: CPT | Mod: S$GLB,,, | Performed by: OBSTETRICS & GYNECOLOGY

## 2024-08-06 RX ORDER — PREDNISONE 10 MG/1
10 TABLET ORAL 2 TIMES DAILY
COMMUNITY
Start: 2024-06-26 | End: 2024-08-06

## 2024-08-06 RX ORDER — SERTRALINE HYDROCHLORIDE 25 MG/1
25 TABLET, FILM COATED ORAL DAILY
Qty: 90 TABLET | Refills: 3 | Status: SHIPPED | OUTPATIENT
Start: 2024-08-06 | End: 2025-08-06

## 2024-11-11 ENCOUNTER — PATIENT MESSAGE (OUTPATIENT)
Dept: OBSTETRICS AND GYNECOLOGY | Facility: CLINIC | Age: 29
End: 2024-11-11
Payer: COMMERCIAL

## 2024-11-12 ENCOUNTER — E-VISIT (OUTPATIENT)
Dept: OBSTETRICS AND GYNECOLOGY | Facility: CLINIC | Age: 29
End: 2024-11-12
Attending: STUDENT IN AN ORGANIZED HEALTH CARE EDUCATION/TRAINING PROGRAM
Payer: COMMERCIAL

## 2024-11-12 DIAGNOSIS — B37.9 YEAST INFECTION: Primary | ICD-10-CM

## 2024-11-14 RX ORDER — FLUCONAZOLE 150 MG/1
150 TABLET ORAL ONCE
Qty: 2 TABLET | Refills: 1 | Status: SHIPPED | OUTPATIENT
Start: 2024-11-14 | End: 2024-11-14

## 2024-11-14 NOTE — PROGRESS NOTES
Patient ID: Niesha iPerre is a 29 y.o. female.    Chief Complaint: Vaginal Discharge (Entered automatically based on patient selection in [employer].)    The patient initiated a request through [employer] on 11/12/2024 for evaluation and management with a chief complaint of Vaginal Discharge (Entered automatically based on patient selection in [employer].)     I evaluated the questionnaire submission on 11/14/24.    [facility] Evisit Vaginal Concerns    11/12/2024  5:46 PM CST - Filed by Patient   Do you agree to participate in an E-Visit? Yes   If you have any of the following symptoms,  please do not complete an E-Visit,  schedule an appointment with your provider: I acknowledge   Choose the state of your primary residence [place]   Select all that apply: None of the above   What is the main issue you would like addressed today? Yeast Infection   Which of the following vaginal concerns do you have? Itching   Do you have vaginal discharge? White/milky discharge   Do you have pain while passing urine? No   Do you have any of the following symptoms? None of the above    Have you taken antibiotics in the last two weeks? Yes   What is the name of the antibiotic? Amoxicillin    Do you use any of the following? No   Which of the following applies to your menstrual period? Had in the last 2 weeks   Which of the following applies to your menstrual cycle? Normal amount of bleeding   Do you have spotting between periods? No   Do you have pain with your period? Yes   What type of pain do you have with your period? Cramping   Have you had similar symptoms in the past? More than once   When you had similar symptoms in the past, did any of the following work? Pills for yeast infection   Have you had a temperature of 100.4 or higher? No   Provide any additional information you feel is important.    Please attach any relevant images or files    Are you able to take your vital signs? No         Encounter Diagnosis   Name  Primary?    Yeast infection Yes        No orders of the defined types were placed in this encounter.     Medications Ordered This Encounter   Medications    fluconazole (DIFLUCAN) 150 MG Tab     Sig: Take 1 tablet (150 mg total) by mouth once. Take one tablet. If symptoms persist, repeat dose in 3 days. for 1 dose     Dispense:  2 tablet     Refill:  1        No follow-ups on file.      E-Visit Time Tracking:    Day 1 Time (in minutes): 5    Total Time (in minutes): 5

## 2025-02-16 ENCOUNTER — E-VISIT (OUTPATIENT)
Dept: FAMILY MEDICINE | Facility: CLINIC | Age: 30
End: 2025-02-16
Payer: COMMERCIAL

## 2025-02-16 DIAGNOSIS — B37.9 YEAST INFECTION: Primary | ICD-10-CM

## 2025-02-16 DIAGNOSIS — N89.8 VAGINAL IRRITATION: ICD-10-CM

## 2025-02-17 RX ORDER — FLUCONAZOLE 150 MG/1
150 TABLET ORAL
Qty: 2 TABLET | Refills: 0 | Status: SHIPPED | OUTPATIENT
Start: 2025-02-17

## 2025-02-17 NOTE — PROGRESS NOTES
Patient ID: Niesha Pierre is a 30 y.o. female.    Chief Complaint: General Illness (Entered automatically based on patient selection in [employer].)    The patient initiated a request through [employer] on 2/16/2025 for evaluation and management with a chief complaint of General Illness (Entered automatically based on patient selection in [employer].)     I evaluated the questionnaire submission on 2/17/2025.    [facility]    2/16/2025  8:20 PM CST - Filed by Patient   What do you need help with? Vaginal Symptoms   Do you agree to participate in an E-Visit? Yes   If you have any of the following symptoms,  please do not complete an E-Visit,  schedule an appointment with your provider: I acknowledge   Do you have any of the following pregnancy-related conditions? None   What is the main issue you would like addressed today? Yeast Infection   Which of the following vaginal concerns do you have? Discharge;  Itching   Do you have vaginal discharge? White/milky discharge   Do you have pain while passing urine? Yes   Do you have any of the following symptoms? None of the above    Have you taken antibiotics in the last two weeks? No    Do you use any of the following? No   Which of the following applies to your menstrual period? Expect soon   Which of the following applies to your menstrual cycle? Normal amount of bleeding   Do you have spotting between periods? Yes   Do you have pain with your period? Yes   What type of pain do you have with your period? Cramping   Have you had similar symptoms in the past? Frequently   When you had similar symptoms in the past, did any of the following work? Pills for yeast infection   Have you had a temperature of 100.4 or higher? No   Provide any additional information you feel is important.    Please attach any relevant images or files    Are you able to take your vital signs? No         Encounter Diagnoses   Name Primary?    Vaginal irritation      Yeast infection Yes        No orders of the defined types were placed in this encounter.     Medications Ordered This Encounter   Medications    fluconazole (DIFLUCAN) 150 MG Tab     Sig: Take 1 tablet (150 mg total) by mouth every 72 hours as needed (May repeat in 72 hours if needed.).     Dispense:  2 tablet     Refill:  0        No follow-ups on file.      E-Visit Time Tracking:    Day 1 Time (in minutes): 5    Total Time (in minutes): 5

## 2025-02-25 ENCOUNTER — PATIENT MESSAGE (OUTPATIENT)
Dept: OBSTETRICS AND GYNECOLOGY | Facility: CLINIC | Age: 30
End: 2025-02-25
Payer: COMMERCIAL